# Patient Record
Sex: FEMALE | Race: BLACK OR AFRICAN AMERICAN | NOT HISPANIC OR LATINO | Employment: UNEMPLOYED | URBAN - METROPOLITAN AREA
[De-identification: names, ages, dates, MRNs, and addresses within clinical notes are randomized per-mention and may not be internally consistent; named-entity substitution may affect disease eponyms.]

---

## 2017-08-08 ENCOUNTER — HOSPITAL ENCOUNTER (EMERGENCY)
Facility: HOSPITAL | Age: 37
Discharge: HOME | End: 2017-08-08
Attending: EMERGENCY MEDICINE
Payer: MEDICAID

## 2017-08-08 VITALS
OXYGEN SATURATION: 99 % | HEIGHT: 67 IN | TEMPERATURE: 98 F | DIASTOLIC BLOOD PRESSURE: 83 MMHG | BODY MASS INDEX: 45.99 KG/M2 | RESPIRATION RATE: 20 BRPM | HEART RATE: 67 BPM | SYSTOLIC BLOOD PRESSURE: 141 MMHG | WEIGHT: 293 LBS

## 2017-08-08 DIAGNOSIS — R20.0 LEFT FACIAL NUMBNESS: Primary | ICD-10-CM

## 2017-08-08 DIAGNOSIS — R51.9 HEADACHE: ICD-10-CM

## 2017-08-08 LAB
ANION GAP SERPL CALC-SCNC: 8 MMOL/L
BASOPHILS # BLD AUTO: 0 K/UL
BASOPHILS NFR BLD: 0.4 %
BUN SERPL-MCNC: 11 MG/DL
CALCIUM SERPL-MCNC: 9.3 MG/DL
CHLORIDE SERPL-SCNC: 106 MMOL/L
CO2 SERPL-SCNC: 25 MMOL/L
CREAT SERPL-MCNC: 0.9 MG/DL
DIFFERENTIAL METHOD: ABNORMAL
EOSINOPHIL # BLD AUTO: 0.1 K/UL
EOSINOPHIL NFR BLD: 1.6 %
ERYTHROCYTE [DISTWIDTH] IN BLOOD BY AUTOMATED COUNT: 17.8 %
EST. GFR  (AFRICAN AMERICAN): >60 ML/MIN/1.73 M^2
EST. GFR  (NON AFRICAN AMERICAN): >60 ML/MIN/1.73 M^2
GLUCOSE SERPL-MCNC: 96 MG/DL
HCT VFR BLD AUTO: 38.9 %
HGB BLD-MCNC: 12.1 G/DL
LYMPHOCYTES # BLD AUTO: 1.3 K/UL
LYMPHOCYTES NFR BLD: 19.6 %
MCH RBC QN AUTO: 24.4 PG
MCHC RBC AUTO-ENTMCNC: 31.1 G/DL
MCV RBC AUTO: 78 FL
MONOCYTES # BLD AUTO: 0.2 K/UL
MONOCYTES NFR BLD: 3.6 %
NEUTROPHILS # BLD AUTO: 5.1 K/UL
NEUTROPHILS NFR BLD: 74.8 %
PLATELET # BLD AUTO: 373 K/UL
PMV BLD AUTO: 8.9 FL
POTASSIUM SERPL-SCNC: 4.4 MMOL/L
RBC # BLD AUTO: 4.98 M/UL
SODIUM SERPL-SCNC: 139 MMOL/L
WBC # BLD AUTO: 6.8 K/UL

## 2017-08-08 PROCEDURE — 96374 THER/PROPH/DIAG INJ IV PUSH: CPT

## 2017-08-08 PROCEDURE — 99284 EMERGENCY DEPT VISIT MOD MDM: CPT | Mod: 25

## 2017-08-08 PROCEDURE — 96361 HYDRATE IV INFUSION ADD-ON: CPT

## 2017-08-08 PROCEDURE — 25000003 PHARM REV CODE 250: Performed by: EMERGENCY MEDICINE

## 2017-08-08 PROCEDURE — 85025 COMPLETE CBC W/AUTO DIFF WBC: CPT

## 2017-08-08 PROCEDURE — 25500020 PHARM REV CODE 255

## 2017-08-08 PROCEDURE — 63600175 PHARM REV CODE 636 W HCPCS: Performed by: EMERGENCY MEDICINE

## 2017-08-08 PROCEDURE — 80048 BASIC METABOLIC PNL TOTAL CA: CPT

## 2017-08-08 PROCEDURE — 96375 TX/PRO/DX INJ NEW DRUG ADDON: CPT

## 2017-08-08 PROCEDURE — 36415 COLL VENOUS BLD VENIPUNCTURE: CPT

## 2017-08-08 RX ORDER — FLUOXETINE HYDROCHLORIDE 40 MG/1
40 CAPSULE ORAL DAILY
COMMUNITY

## 2017-08-08 RX ORDER — AMLODIPINE BESYLATE 10 MG/1
10 TABLET ORAL DAILY
COMMUNITY
End: 2020-09-07 | Stop reason: ALTCHOICE

## 2017-08-08 RX ORDER — KETOROLAC TROMETHAMINE 30 MG/ML
10 INJECTION, SOLUTION INTRAMUSCULAR; INTRAVENOUS
Status: COMPLETED | OUTPATIENT
Start: 2017-08-08 | End: 2017-08-08

## 2017-08-08 RX ORDER — VALSARTAN 160 MG/1
320 TABLET ORAL 2 TIMES DAILY
Status: ON HOLD | COMMUNITY
End: 2021-11-17 | Stop reason: HOSPADM

## 2017-08-08 RX ORDER — METOPROLOL SUCCINATE 50 MG/1
50 TABLET, EXTENDED RELEASE ORAL 2 TIMES DAILY
COMMUNITY
End: 2017-08-08

## 2017-08-08 RX ORDER — METOCLOPRAMIDE HYDROCHLORIDE 5 MG/ML
10 INJECTION INTRAMUSCULAR; INTRAVENOUS
Status: COMPLETED | OUTPATIENT
Start: 2017-08-08 | End: 2017-08-08

## 2017-08-08 RX ORDER — ALPRAZOLAM 2 MG/1
2 TABLET ORAL 3 TIMES DAILY PRN
Status: ON HOLD | COMMUNITY
End: 2022-02-05 | Stop reason: HOSPADM

## 2017-08-08 RX ORDER — METOPROLOL TARTRATE 50 MG/1
100 TABLET ORAL 2 TIMES DAILY
COMMUNITY
End: 2022-01-11

## 2017-08-08 RX ORDER — DIPHENHYDRAMINE HYDROCHLORIDE 50 MG/ML
25 INJECTION INTRAMUSCULAR; INTRAVENOUS
Status: COMPLETED | OUTPATIENT
Start: 2017-08-08 | End: 2017-08-08

## 2017-08-08 RX ORDER — HYDROCODONE BITARTRATE AND ACETAMINOPHEN 10; 325 MG/1; MG/1
1 TABLET ORAL EVERY 6 HOURS PRN
Status: ON HOLD | COMMUNITY
End: 2022-02-05 | Stop reason: HOSPADM

## 2017-08-08 RX ADMIN — SODIUM CHLORIDE 1000 ML: 0.9 INJECTION, SOLUTION INTRAVENOUS at 02:08

## 2017-08-08 RX ADMIN — IOHEXOL 75 ML: 350 INJECTION, SOLUTION INTRAVENOUS at 03:08

## 2017-08-08 RX ADMIN — DIPHENHYDRAMINE HYDROCHLORIDE 25 MG: 50 INJECTION, SOLUTION INTRAMUSCULAR; INTRAVENOUS at 02:08

## 2017-08-08 RX ADMIN — KETOROLAC TROMETHAMINE 10 MG: 30 INJECTION, SOLUTION INTRAMUSCULAR at 02:08

## 2017-08-08 RX ADMIN — METOCLOPRAMIDE 10 MG: 5 INJECTION, SOLUTION INTRAMUSCULAR; INTRAVENOUS at 02:08

## 2017-08-08 NOTE — ED NOTES
"Presents to the ER with c/o occipital headache with pressure behind her left eye that started yesterday. Patient reports that her headache is intermittent and "Just hurts." Associated complaints are photophobia and one episode of emesis today and left sided facial numbness. Bilateral hand  are strong and equal, positive sensation to upper and lower extremities, patient does have decreased sensation to left side of face, smile is symmetrical. Mucous membranes are pink and moist. Skin is warm, dry and intact. Lungs are clear bilaterally, respirations are regular and unlabored. Denies cough, congestion, rhinorrhea or SOB. BS active x4, no tenderness with palpation, abd is soft and not distended. Denies any appetite or activity change. S1S2, capillary refill is < 2 seconds. Denies dysuria, difficulty urinating, frequency, numbness, tingling or weakness. DAVION ARMSTRONGS    "

## 2017-08-08 NOTE — ED NOTES
"Patient has been updated on plan of care. Patient reports that she feels "Slightly better than when she arrived."   "

## 2017-08-08 NOTE — ED NOTES
Upon discharge, patient is AAOx4, no cardiac or respiratory complications. Follow up care reviewed with patient and has been instructed to return to the ER if needed. Patient verbalized understanding and ambulated to the lobby without difficulty. EDGAR RICARDO

## 2017-08-08 NOTE — ED PROVIDER NOTES
"Encounter Date: 8/8/2017    SCRIBE #1 NOTE: I, Boaz Humphries, am scribing for, and in the presence of, Dr. Fairchild.       History     Chief Complaint   Patient presents with    Headache     started yesterday. woke up this am with Left facial burning / numbness       08/08/2017 2:03 PM     Chief Complaint: ANGELA Hernandez is a 36 y.o. female with a history of HTN, Depression, Anxiety, and Obese who presents to the ED with complaints of gradually worsened HA since yesterday. Pt reports the pain started behind the left eye. She notes left facial numbness. She reports HAs associated with HTN. She has taken her HTN medication with no relief of Sx. Pt experienced 1 episode of vomited today. She denies rhinorrhea, cough, and fever. Pt ambulated fine. Pt has NKDA.       The history is provided by the patient.     Review of patient's allergies indicates:  No Known Allergies  Past Medical History:   Diagnosis Date    Hypertension     Obesity      Past Surgical History:   Procedure Laterality Date    CHOLECYSTECTOMY       History reviewed. No pertinent family history.  Social History   Substance Use Topics    Smoking status: Never Smoker    Smokeless tobacco: Not on file    Alcohol use No     Review of Systems   Constitutional: Negative for fever.   HENT: Negative for rhinorrhea and sore throat.    Respiratory: Negative for cough and shortness of breath.    Cardiovascular: Negative for chest pain.   Gastrointestinal: Positive for abdominal pain, nausea and vomiting.   Genitourinary: Negative for dysuria.   Musculoskeletal: Negative for back pain.   Skin: Negative for rash.   Neurological: Positive for headaches. Negative for weakness.        + for "left cheek numbness"   Hematological: Does not bruise/bleed easily.       Physical Exam     Initial Vitals [08/08/17 1221]   BP Pulse Resp Temp SpO2   (!) 176/117 87 20 98.4 °F (36.9 °C) 98 %      MAP       136.67         Physical Exam    Nursing note and " vitals reviewed.  Constitutional: She appears well-developed and well-nourished. She is not diaphoretic. No distress.   HENT:   Head: Normocephalic and atraumatic.   Mouth/Throat: Oropharynx is clear and moist.   Eyes: Conjunctivae are normal.   Neck: Neck supple.   Cardiovascular: Normal rate, regular rhythm, normal heart sounds and intact distal pulses. Exam reveals no gallop and no friction rub.    No murmur heard.  Pulmonary/Chest: Breath sounds normal. She has no wheezes. She has no rhonchi. She has no rales.   Abdominal: Soft. She exhibits no distension. There is no tenderness.   Musculoskeletal: Normal range of motion.   Normal gait.    Neurological: She is alert and oriented to person, place, and time. No cranial nerve deficit.   Decrease sensation to light touch left axial facial. Cranial nerves intact. Normal sensation and strength.    Skin: No rash noted. No erythema.   Psychiatric: She has a normal mood and affect. Her speech is normal and behavior is normal. Judgment and thought content normal.         ED Course   Procedures  Labs Reviewed - No data to display              Imaging Results          CTA Brain (Final result)  Result time 08/08/17 17:39:21    Final result by Isa Braun MD (08/08/17 17:39:21)                 Impression:        Some limited evaluation with streaky artifact.  Small vessel disease cannot be excluded.  Also proximal left middle cerebral artery aneurysm cannot be excluded although the appearance is more likely due to artifact and the tortuosity but consider further evaluation a followup MRA.    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    Opacification of mastoids and paranasal sinuses as described.    No acute intracranial findings      Results were discussed with Dr. Fairchild        Electronically signed by: ISA BRAUN MD  Date:      08/08/17  Time:    17:39              Narrative:    CTA of the head was obtained axial scans obtained without and with IV contrast, 75 cc Omnipaque 350 injected to previously and multiplanar 2-D and 3-D reformatted images obtained    Comparison study: None    FINDINGS    Ventricles, sulci, fissures are unremarkable in appearance for the patient's age and there is no acute intracranial findings with no intracranial mass effect, acute intracranial hemorrhage or major vascular territory infarct..  There is a 2.0 x 0.6 cm expansile lytic lesion in the skull right parietal with overlying cortex thin but intact.    There is opacification of much of left mastoids with mild opacification right mastoids.  There is mucosal thickening and also 1 cm polypoid density suggesting retention cyst in the left maxillary sinus.    Streaky artifact along with some sinus opacification and in particular cavernous sinus somewhat limits evaluation.  There is hypoplastic left A1 segment there is irregularity of the peripheral aspects of the middle cerebral arteries and of the posterior cerebral arteries most likely artifact and less likely stenosis with no definite significant central intracranial stenosis.  Proximal left middle cervical artery aneurysm is difficult to exclude but the appearance more likely is due to the artifact and averaging or tortuous vessels.  If indicated clinically further evaluation could be considered with MRA and also suggest bone scan or followup MRI without and with contrast evaluate the calvarial lesion.    There is opacification of the superior sagittal sinus without superior sagittal sinus thrombosis.                            (d/w interpreting radiologist)    (rad read)    The patient was informed of the incidental finding(s) as well as the need for PCP or specialist follow-up for reevaluation and possible further investigation or monitoring.          Scribe Attestation:   Scribe #1: I performed the  above scribed service and the documentation accurately describes the services I performed. I attest to the accuracy of the note.    Attending Attestation:           Physician Attestation for Scribe:  Physician Attestation Statement for Scribe #1: I, Dr. Fairchild, reviewed documentation, as scribed by Boaz Humphries in my presence, and it is both accurate and complete.         Maribel Hernandez is a 36 y.o. female presenting with hypertension on initial presentation accompanied by headache and left lower facial paresthesias.  I did speak with Dr. Maynor Henning on-call for neurology to discuss the case and initial imaging.  The patient is too heavy for MRI imaging and he did recommend CT imaging with contrast as advised by radiology for assessment including for CVST.  I have very low suspicion for subarachnoid hemorrhage and do not think lumbar puncture is indicated.  I doubt idiopathic intracranial hypertension.  CVA less likely given lack of associated findings or symptoms.  Patient was treated symptomatically with IV fluids along with IV ketorolac and metoclopramide with significant improvement headache and resolution of hypertension.  I do not think IV antihypertensive are necessary.  I did offer admission for observation given inability to perform MRI.  I doubt CVST at this point.  No sign of PRES or ICH. She prefers to observe at home with close outpatient PCP and neurology follow-up with referrals given.  Incidental findings noted on CT discussed with patient in detail to be follow-up for further monitoring as well.  Detailed return precautions reviewed.          ED Course     Clinical Impression:   The primary encounter diagnosis was Left facial numbness. A diagnosis of Headache was also pertinent to this visit.                           Jenaro Fairchild MD  08/08/17 [id number]

## 2017-08-17 ENCOUNTER — TELEPHONE (OUTPATIENT)
Dept: NEUROLOGY | Facility: CLINIC | Age: 37
End: 2017-08-17

## 2017-08-17 NOTE — TELEPHONE ENCOUNTER
----- Message from Hollie Lopez sent at 8/17/2017 10:30 AM CDT -----  Schedule a new patient appointment.  Please call Dr. Mercer's office/Francie at [phone].

## 2017-08-17 NOTE — TELEPHONE ENCOUNTER
Returned call and spoke with Francie. Referral received. However, patient has medicaid. Informed Francie that our medicaid spots were booked until December. Francie verbalized understanding and will send referral over to [facility].

## 2020-08-18 ENCOUNTER — HOSPITAL ENCOUNTER (EMERGENCY)
Facility: HOSPITAL | Age: 40
Discharge: HOME | End: 2020-08-19
Attending: EMERGENCY MEDICINE
Payer: MEDICAID

## 2020-08-18 DIAGNOSIS — I10 HYPERTENSION, UNSPECIFIED TYPE: ICD-10-CM

## 2020-08-18 DIAGNOSIS — R00.2 PALPITATIONS: ICD-10-CM

## 2020-08-18 DIAGNOSIS — R51.9 ACUTE NONINTRACTABLE HEADACHE, UNSPECIFIED HEADACHE TYPE: Primary | ICD-10-CM

## 2020-08-18 LAB
BASOPHILS # BLD AUTO: 0.06 K/UL (ref 0–0.2)
BASOPHILS NFR BLD: 0.6 % (ref 0–1.9)
DIFFERENTIAL METHOD: ABNORMAL
EOSINOPHIL # BLD AUTO: 0.3 K/UL (ref 0–0.5)
EOSINOPHIL NFR BLD: 2.6 % (ref 0–8)
ERYTHROCYTE [DISTWIDTH] IN BLOOD BY AUTOMATED COUNT: 13.5 % (ref 11.5–14.5)
HCT VFR BLD AUTO: 43.1 % (ref 37–48.5)
HGB BLD-MCNC: 13.2 G/DL (ref 12–16)
IMM GRANULOCYTES # BLD AUTO: 0.04 K/UL (ref 0–0.04)
IMM GRANULOCYTES NFR BLD AUTO: 0.4 % (ref 0–0.5)
LYMPHOCYTES # BLD AUTO: 2.3 K/UL (ref 1–4.8)
LYMPHOCYTES NFR BLD: 23.6 % (ref 18–48)
MCH RBC QN AUTO: 28.1 PG (ref 27–31)
MCHC RBC AUTO-ENTMCNC: 30.6 G/DL (ref 32–36)
MCV RBC AUTO: 92 FL (ref 82–98)
MONOCYTES # BLD AUTO: 0.5 K/UL (ref 0.3–1)
MONOCYTES NFR BLD: 4.7 % (ref 4–15)
NEUTROPHILS # BLD AUTO: 6.7 K/UL (ref 1.8–7.7)
NEUTROPHILS NFR BLD: 68.1 % (ref 38–73)
NRBC BLD-RTO: 0 /100 WBC
PLATELET # BLD AUTO: 352 K/UL (ref 150–350)
PMV BLD AUTO: 10.5 FL (ref 9.2–12.9)
RBC # BLD AUTO: 4.69 M/UL (ref 4–5.4)
WBC # BLD AUTO: 9.86 K/UL (ref 3.9–12.7)

## 2020-08-18 PROCEDURE — 99285 EMERGENCY DEPT VISIT HI MDM: CPT | Mod: 25

## 2020-08-18 PROCEDURE — 85025 COMPLETE CBC W/AUTO DIFF WBC: CPT

## 2020-08-18 PROCEDURE — 93005 ELECTROCARDIOGRAM TRACING: CPT | Performed by: INTERNAL MEDICINE

## 2020-08-18 PROCEDURE — 84484 ASSAY OF TROPONIN QUANT: CPT

## 2020-08-18 PROCEDURE — 80053 COMPREHEN METABOLIC PANEL: CPT

## 2020-08-18 PROCEDURE — 84443 ASSAY THYROID STIM HORMONE: CPT

## 2020-08-18 PROCEDURE — 83735 ASSAY OF MAGNESIUM: CPT

## 2020-08-18 PROCEDURE — 83880 ASSAY OF NATRIURETIC PEPTIDE: CPT

## 2020-08-18 RX ORDER — PROCHLORPERAZINE EDISYLATE 5 MG/ML
10 INJECTION INTRAMUSCULAR; INTRAVENOUS
Status: COMPLETED | OUTPATIENT
Start: 2020-08-19 | End: 2020-08-19

## 2020-08-18 RX ORDER — DIPHENHYDRAMINE HYDROCHLORIDE 50 MG/ML
12.5 INJECTION INTRAMUSCULAR; INTRAVENOUS
Status: COMPLETED | OUTPATIENT
Start: 2020-08-19 | End: 2020-08-19

## 2020-08-18 RX ORDER — DIPHENHYDRAMINE HCL 25 MG
25 CAPSULE ORAL
Status: DISCONTINUED | OUTPATIENT
Start: 2020-08-19 | End: 2020-08-18

## 2020-08-19 VITALS
HEART RATE: 88 BPM | WEIGHT: 293 LBS | HEIGHT: 67 IN | BODY MASS INDEX: 45.99 KG/M2 | TEMPERATURE: 99 F | RESPIRATION RATE: 18 BRPM | SYSTOLIC BLOOD PRESSURE: 179 MMHG | DIASTOLIC BLOOD PRESSURE: 97 MMHG | OXYGEN SATURATION: 97 %

## 2020-08-19 LAB
ALBUMIN SERPL BCP-MCNC: 3.4 G/DL (ref 3.5–5.2)
ALP SERPL-CCNC: 129 U/L (ref 55–135)
ALT SERPL W/O P-5'-P-CCNC: 28 U/L (ref 10–44)
ANION GAP SERPL CALC-SCNC: 8 MMOL/L (ref 8–16)
AST SERPL-CCNC: 18 U/L (ref 10–40)
BILIRUB SERPL-MCNC: 1.2 MG/DL (ref 0.1–1)
BNP SERPL-MCNC: 47 PG/ML (ref 0–99)
BUN SERPL-MCNC: 13 MG/DL (ref 6–20)
CALCIUM SERPL-MCNC: 8.2 MG/DL (ref 8.7–10.5)
CHLORIDE SERPL-SCNC: 105 MMOL/L (ref 95–110)
CO2 SERPL-SCNC: 27 MMOL/L (ref 23–29)
CREAT SERPL-MCNC: 0.9 MG/DL (ref 0.5–1.4)
EST. GFR  (AFRICAN AMERICAN): >60 ML/MIN/1.73 M^2
EST. GFR  (NON AFRICAN AMERICAN): >60 ML/MIN/1.73 M^2
GLUCOSE SERPL-MCNC: 125 MG/DL (ref 70–110)
MAGNESIUM SERPL-MCNC: 1.9 MG/DL (ref 1.6–2.6)
POTASSIUM SERPL-SCNC: 3.6 MMOL/L (ref 3.5–5.1)
PROT SERPL-MCNC: 7.1 G/DL (ref 6–8.4)
SODIUM SERPL-SCNC: 140 MMOL/L (ref 136–145)
TROPONIN I SERPL DL<=0.01 NG/ML-MCNC: <0.03 NG/ML
TSH SERPL DL<=0.005 MIU/L-ACNC: 2.71 UIU/ML (ref 0.34–5.6)

## 2020-08-19 PROCEDURE — 96361 HYDRATE IV INFUSION ADD-ON: CPT

## 2020-08-19 PROCEDURE — 25000003 PHARM REV CODE 250

## 2020-08-19 PROCEDURE — 96374 THER/PROPH/DIAG INJ IV PUSH: CPT

## 2020-08-19 PROCEDURE — 63600175 PHARM REV CODE 636 W HCPCS

## 2020-08-19 PROCEDURE — 93005 ELECTROCARDIOGRAM TRACING: CPT | Performed by: INTERNAL MEDICINE

## 2020-08-19 PROCEDURE — 96375 TX/PRO/DX INJ NEW DRUG ADDON: CPT

## 2020-08-19 RX ORDER — CLONIDINE HYDROCHLORIDE 0.1 MG/1
0.1 TABLET ORAL
Status: COMPLETED | OUTPATIENT
Start: 2020-08-19 | End: 2020-08-19

## 2020-08-19 RX ADMIN — CLONIDINE HYDROCHLORIDE 0.1 MG: 0.1 TABLET ORAL at 01:08

## 2020-08-19 RX ADMIN — SODIUM CHLORIDE, SODIUM LACTATE, POTASSIUM CHLORIDE, AND CALCIUM CHLORIDE 1000 ML: .6; .31; .03; .02 INJECTION, SOLUTION INTRAVENOUS at 12:08

## 2020-08-19 RX ADMIN — DIPHENHYDRAMINE HYDROCHLORIDE 12.5 MG: 50 INJECTION, SOLUTION INTRAMUSCULAR; INTRAVENOUS at 12:08

## 2020-08-19 RX ADMIN — PROCHLORPERAZINE EDISYLATE 10 MG: 5 INJECTION INTRAMUSCULAR; INTRAVENOUS at 12:08

## 2020-08-19 NOTE — ED PROVIDER NOTES
Encounter Date: 8/18/2020       History     Chief Complaint   Patient presents with    Headache     Sudden. Worst in her life. 1 hour PTA.    Palpitations     Felt palptations while sitting in car. Resolved PTA.     HPI   40yo F with h/o HTN who presents with headache. The pt states she had sudden onset of severe frontal headache that started just prior to arrival. She has no numbness, weakness, or vision changes. No n/v/d. She does have photophobia. This occurred while sitting in her car talking to a friend. It came on with feelings of palpitations. She denies chest pain or SOB. Prior to onset she was feeling overall well and had a normal day. She has been compliant with her medications although tells me her BP typically runs 150s/90s despite medications. She does have a h/o headaches, but has not had one for months. Denies h/o heart disease.     Review of patient's allergies indicates:  No Known Allergies  Past Medical History:   Diagnosis Date    Hypertension     Obesity      Past Surgical History:   Procedure Laterality Date    CHOLECYSTECTOMY       No family history on file.  Social History     Tobacco Use    Smoking status: Never Smoker   Substance Use Topics    Alcohol use: No    Drug use: Not on file     Review of Systems   Constitutional: Negative for fever.   HENT: Negative for sore throat.    Eyes: Positive for photophobia.   Respiratory: Negative for shortness of breath.    Cardiovascular: Positive for palpitations. Negative for chest pain.   Gastrointestinal: Negative for nausea.   Genitourinary: Negative for dysuria.   Musculoskeletal: Negative for back pain.   Skin: Negative for rash.   Neurological: Positive for headaches. Negative for weakness.   Hematological: Does not bruise/bleed easily.       Physical Exam     Initial Vitals [08/18/20 2308]   BP Pulse Resp Temp SpO2   (!) 139/112 88 20 98.7 °F (37.1 °C) 96 %      MAP       --         Physical Exam    Constitutional: She appears  well-developed and well-nourished. She is not diaphoretic.   Able to self ambulate from stretcher to bed. Speaks in full sentences. Alert   HENT:   Head: Normocephalic and atraumatic.   Eyes: Conjunctivae and EOM are normal. Pupils are equal, round, and reactive to light. Right eye exhibits no discharge. Left eye exhibits no discharge.   Pupils 4mm b/l, PERRL   Neck: Normal range of motion. Neck supple.   Cardiovascular: Normal rate, regular rhythm and normal heart sounds. Exam reveals no gallop and no friction rub.    No murmur heard.  2+ radial pulses equal b/l   Pulmonary/Chest: Breath sounds normal. No respiratory distress. She has no wheezes. She has no rhonchi. She has no rales.   Abdominal: Soft. She exhibits no distension. There is no abdominal tenderness. There is no rebound and no guarding.   Musculoskeletal: No tenderness or edema.   Neurological: She is alert and oriented to person, place, and time.   CN II-XII in tact. Strength 5/5 in b/l LE, 5/5 in b/l UE. Sensation to light touch is in tact in b/l UE and LE. No dysarthria, clear speech.   Skin: Skin is warm and dry.   Psychiatric: She has a normal mood and affect. Thought content normal.         ED Course   Procedures  Labs Reviewed   CBC W/ AUTO DIFFERENTIAL   COMPREHENSIVE METABOLIC PANEL   B-TYPE NATRIURETIC PEPTIDE   TROPONIN I   TSH   MAGNESIUM          Imaging Results    None       MDM  40yo F with headache  VS with EMS notable for HTN to SBP 170s, regular rhythm on the monitor.  On chart review the patient did have a CTA 3 years prior notable for artifact that could not rule out MCA aneurysm (although does not appear to be an aneurysm per radiology) and an expansile lytic lesion in the rt parietal bone. The patient was lost to follow up and it appears that she was unable to obtain an MRI due to her obesity. Will screen for causes of palpitations with cardiac w/u and screen for intracranial bleed with CT scan. Given <1hr since onset, I would  expect a high NPV of a normal CTH. Pending labs and w/u, will treat HA following EKG.    Kristian Hernandez MD  Resident, PGY-3  8/18/2020 11:34 PM                    Attending Attestation:   Physician Attestation Statement for Resident:  As the supervising MD   Physician Attestation Statement: I have personally seen and examined this patient.   I agree with the above history. -: Patient presents with acute onset of headache just prior to arrival.  Patient also reports palpitations.   As the supervising MD I agree with the above PE.   -: No neurologic deficits.   As the supervising MD I agree with the above treatment, course, plan, and disposition.   -: Patient presents with acute headache.  No neurologic deficits.  CT head is unremarkable.  Patient was noted to have PACs on telemetry monitoring.  Laboratory evaluation initiated.  Electrolytes are normal.  After treatment of headache and fluids PACs resolved as well.  I was personally present during the entire procedure.  I have reviewed and agree with the residents interpretation of the following: lab data and CT scans.  I have reviewed the following: old records at this facility and old CTs.                    ED Course as of Aug 19 0149   Tue Aug 18, 2020   2349 EKG shows NSR at 83bpm with nl axis, nl intervals, no MAIKEL. There is nonspecific T wave changes.    [JT]      ED Course User Index  [JT] Kristian Hernandez MD                Clinical Impression:       ICD-10-CM ICD-9-CM   1. Acute nonintractable headache, unspecified headache type  R51 784.0   2. Palpitations  R00.2 785.1   3. Hypertension, unspecified type  I10 401.9                                Ric Castillo MD  08/19/20 0153

## 2020-08-19 NOTE — DISCHARGE INSTRUCTIONS
If you have chest pain, shortness of breath, passing out, or other concerning symptoms, please return to the ER.    It is important that you follow up with a neurologist regarding your headaches and the skull lesion seen on your CT scan.

## 2020-09-07 ENCOUNTER — HOSPITAL ENCOUNTER (OUTPATIENT)
Facility: HOSPITAL | Age: 40
Discharge: HOME | End: 2020-09-10
Attending: EMERGENCY MEDICINE | Admitting: INTERNAL MEDICINE
Payer: MEDICAID

## 2020-09-07 DIAGNOSIS — U07.1 COVID-19 VIRUS INFECTION: Primary | ICD-10-CM

## 2020-09-07 DIAGNOSIS — R06.02 SOB (SHORTNESS OF BREATH): ICD-10-CM

## 2020-09-07 DIAGNOSIS — Z03.89 RULED OUT FOR MYOCARDIAL INFARCTION: ICD-10-CM

## 2020-09-07 DIAGNOSIS — G47.33 OSA ON CPAP: ICD-10-CM

## 2020-09-07 DIAGNOSIS — Z20.822 SUSPECTED COVID-19 VIRUS INFECTION: ICD-10-CM

## 2020-09-07 PROBLEM — J12.82 PNEUMONIA DUE TO COVID-19 VIRUS: Status: ACTIVE | Noted: 2020-09-07

## 2020-09-07 PROBLEM — M19.90 ARTHRITIS: Status: ACTIVE | Noted: 2020-09-07

## 2020-09-07 PROBLEM — E66.01 CLASS 3 SEVERE OBESITY DUE TO EXCESS CALORIES WITH SERIOUS COMORBIDITY AND BODY MASS INDEX (BMI) OF 60.0 TO 69.9 IN ADULT: Status: ACTIVE | Noted: 2020-09-07

## 2020-09-07 PROBLEM — F41.9 ANXIETY: Status: ACTIVE | Noted: 2020-09-07

## 2020-09-07 PROBLEM — E66.813 CLASS 3 SEVERE OBESITY DUE TO EXCESS CALORIES WITH SERIOUS COMORBIDITY AND BODY MASS INDEX (BMI) OF 60.0 TO 69.9 IN ADULT: Status: ACTIVE | Noted: 2020-09-07

## 2020-09-07 PROBLEM — I10 ESSENTIAL HYPERTENSION: Status: ACTIVE | Noted: 2020-09-07

## 2020-09-07 PROBLEM — F33.1 MODERATE EPISODE OF RECURRENT MAJOR DEPRESSIVE DISORDER: Status: ACTIVE | Noted: 2020-09-07

## 2020-09-07 PROBLEM — D75.839 THROMBOCYTOSIS: Status: ACTIVE | Noted: 2020-09-07

## 2020-09-07 LAB
ALBUMIN SERPL BCP-MCNC: 3.5 G/DL (ref 3.5–5.2)
ALP SERPL-CCNC: 109 U/L (ref 55–135)
ALT SERPL W/O P-5'-P-CCNC: 37 U/L (ref 10–44)
ANION GAP SERPL CALC-SCNC: 14 MMOL/L (ref 8–16)
AST SERPL-CCNC: 36 U/L (ref 10–40)
BASOPHILS # BLD AUTO: 0.04 K/UL (ref 0–0.2)
BASOPHILS NFR BLD: 0.5 % (ref 0–1.9)
BILIRUB SERPL-MCNC: 1 MG/DL (ref 0.1–1)
BNP SERPL-MCNC: <10 PG/ML (ref 0–99)
BUN SERPL-MCNC: 9 MG/DL (ref 6–20)
CALCIUM SERPL-MCNC: 9.4 MG/DL (ref 8.7–10.5)
CHLORIDE SERPL-SCNC: 102 MMOL/L (ref 95–110)
CK SERPL-CCNC: 57 U/L (ref 20–180)
CO2 SERPL-SCNC: 27 MMOL/L (ref 23–29)
CREAT SERPL-MCNC: 1 MG/DL (ref 0.5–1.4)
CRP SERPL-MCNC: 13 MG/L (ref 0–8.2)
D DIMER PPP IA.FEU-MCNC: 0.37 MG/L FEU
DIFFERENTIAL METHOD: ABNORMAL
EOSINOPHIL # BLD AUTO: 0.2 K/UL (ref 0–0.5)
EOSINOPHIL NFR BLD: 2.6 % (ref 0–8)
ERYTHROCYTE [DISTWIDTH] IN BLOOD BY AUTOMATED COUNT: 14.1 % (ref 11.5–14.5)
ERYTHROCYTE [SEDIMENTATION RATE] IN BLOOD BY WESTERGREN METHOD: 20 MM/HR (ref 0–20)
EST. GFR  (AFRICAN AMERICAN): >60 ML/MIN/1.73 M^2
EST. GFR  (NON AFRICAN AMERICAN): >60 ML/MIN/1.73 M^2
FERRITIN SERPL-MCNC: 136 NG/ML (ref 20–300)
GLUCOSE SERPL-MCNC: 141 MG/DL (ref 70–110)
HCT VFR BLD AUTO: 46.3 % (ref 37–48.5)
HGB BLD-MCNC: 14 G/DL (ref 12–16)
IMM GRANULOCYTES # BLD AUTO: 0.08 K/UL (ref 0–0.04)
IMM GRANULOCYTES NFR BLD AUTO: 1.1 % (ref 0–0.5)
LACTATE SERPL-SCNC: 2.2 MMOL/L (ref 0.5–2.2)
LDH SERPL L TO P-CCNC: 272 U/L (ref 110–260)
LYMPHOCYTES # BLD AUTO: 1.6 K/UL (ref 1–4.8)
LYMPHOCYTES NFR BLD: 20.5 % (ref 18–48)
MCH RBC QN AUTO: 28.4 PG (ref 27–31)
MCHC RBC AUTO-ENTMCNC: 30.2 G/DL (ref 32–36)
MCV RBC AUTO: 94 FL (ref 82–98)
MONOCYTES # BLD AUTO: 0.5 K/UL (ref 0.3–1)
MONOCYTES NFR BLD: 6.9 % (ref 4–15)
NEUTROPHILS # BLD AUTO: 5.2 K/UL (ref 1.8–7.7)
NEUTROPHILS NFR BLD: 68.4 % (ref 38–73)
NRBC BLD-RTO: 0 /100 WBC
PLATELET # BLD AUTO: 584 K/UL (ref 150–350)
PMV BLD AUTO: 9.6 FL (ref 9.2–12.9)
POTASSIUM SERPL-SCNC: 4 MMOL/L (ref 3.5–5.1)
PROCALCITONIN SERPL IA-MCNC: 0.05 NG/ML
PROT SERPL-MCNC: 8.7 G/DL (ref 6–8.4)
RBC # BLD AUTO: 4.93 M/UL (ref 4–5.4)
SARS-COV-2 RDRP RESP QL NAA+PROBE: POSITIVE
SODIUM SERPL-SCNC: 143 MMOL/L (ref 136–145)
TROPONIN I SERPL DL<=0.01 NG/ML-MCNC: <0.006 NG/ML (ref 0–0.03)
WBC # BLD AUTO: 7.55 K/UL (ref 3.9–12.7)

## 2020-09-07 PROCEDURE — 85025 COMPLETE CBC W/AUTO DIFF WBC: CPT

## 2020-09-07 PROCEDURE — 82728 ASSAY OF FERRITIN: CPT

## 2020-09-07 PROCEDURE — 25000003 PHARM REV CODE 250: Performed by: NURSE PRACTITIONER

## 2020-09-07 PROCEDURE — 63600175 PHARM REV CODE 636 W HCPCS: Performed by: NURSE PRACTITIONER

## 2020-09-07 PROCEDURE — G0378 HOSPITAL OBSERVATION PER HR: HCPCS

## 2020-09-07 PROCEDURE — 85379 FIBRIN DEGRADATION QUANT: CPT

## 2020-09-07 PROCEDURE — 25000003 PHARM REV CODE 250: Performed by: EMERGENCY MEDICINE

## 2020-09-07 PROCEDURE — 93005 ELECTROCARDIOGRAM TRACING: CPT

## 2020-09-07 PROCEDURE — 93010 EKG 12-LEAD: ICD-10-PCS | Mod: ,,, | Performed by: INTERNAL MEDICINE

## 2020-09-07 PROCEDURE — U0002 COVID-19 LAB TEST NON-CDC: HCPCS

## 2020-09-07 PROCEDURE — 83880 ASSAY OF NATRIURETIC PEPTIDE: CPT

## 2020-09-07 PROCEDURE — 84145 PROCALCITONIN (PCT): CPT

## 2020-09-07 PROCEDURE — 82550 ASSAY OF CK (CPK): CPT

## 2020-09-07 PROCEDURE — 96366 THER/PROPH/DIAG IV INF ADDON: CPT | Performed by: EMERGENCY MEDICINE

## 2020-09-07 PROCEDURE — 36415 COLL VENOUS BLD VENIPUNCTURE: CPT

## 2020-09-07 PROCEDURE — 99285 EMERGENCY DEPT VISIT HI MDM: CPT | Mod: 25

## 2020-09-07 PROCEDURE — 96365 THER/PROPH/DIAG IV INF INIT: CPT | Performed by: EMERGENCY MEDICINE

## 2020-09-07 PROCEDURE — 93010 ELECTROCARDIOGRAM REPORT: CPT | Mod: ,,, | Performed by: INTERNAL MEDICINE

## 2020-09-07 PROCEDURE — 63700000 PHARM REV CODE 250 ALT 637 W/O HCPCS: Performed by: NURSE PRACTITIONER

## 2020-09-07 PROCEDURE — 84484 ASSAY OF TROPONIN QUANT: CPT

## 2020-09-07 PROCEDURE — 85651 RBC SED RATE NONAUTOMATED: CPT

## 2020-09-07 PROCEDURE — 86140 C-REACTIVE PROTEIN: CPT

## 2020-09-07 PROCEDURE — 83615 LACTATE (LD) (LDH) ENZYME: CPT

## 2020-09-07 PROCEDURE — 83605 ASSAY OF LACTIC ACID: CPT

## 2020-09-07 PROCEDURE — 80053 COMPREHEN METABOLIC PANEL: CPT

## 2020-09-07 RX ORDER — ACETAMINOPHEN 325 MG/1
650 TABLET ORAL EVERY 4 HOURS PRN
Status: DISCONTINUED | OUTPATIENT
Start: 2020-09-07 | End: 2020-09-10 | Stop reason: HOSPADM

## 2020-09-07 RX ORDER — FUROSEMIDE 40 MG/1
40 TABLET ORAL 2 TIMES DAILY
Status: DISCONTINUED | OUTPATIENT
Start: 2020-09-07 | End: 2020-09-07

## 2020-09-07 RX ORDER — HYDROCODONE BITARTRATE AND ACETAMINOPHEN 10; 325 MG/1; MG/1
1 TABLET ORAL EVERY 8 HOURS PRN
Status: DISCONTINUED | OUTPATIENT
Start: 2020-09-07 | End: 2020-09-10 | Stop reason: HOSPADM

## 2020-09-07 RX ORDER — ALPRAZOLAM 1 MG/1
2 TABLET ORAL 3 TIMES DAILY PRN
Status: DISCONTINUED | OUTPATIENT
Start: 2020-09-07 | End: 2020-09-10 | Stop reason: HOSPADM

## 2020-09-07 RX ORDER — ASCORBIC ACID 500 MG
500 TABLET ORAL 2 TIMES DAILY
Status: DISCONTINUED | OUTPATIENT
Start: 2020-09-07 | End: 2020-09-10 | Stop reason: HOSPADM

## 2020-09-07 RX ORDER — IBUPROFEN 200 MG
24 TABLET ORAL
Status: DISCONTINUED | OUTPATIENT
Start: 2020-09-07 | End: 2020-09-10 | Stop reason: HOSPADM

## 2020-09-07 RX ORDER — POTASSIUM CHLORIDE 1.5 G/1.58G
40 POWDER, FOR SOLUTION ORAL
Status: DISCONTINUED | OUTPATIENT
Start: 2020-09-07 | End: 2020-09-10 | Stop reason: HOSPADM

## 2020-09-07 RX ORDER — VALSARTAN 80 MG/1
320 TABLET ORAL 2 TIMES DAILY
Status: DISCONTINUED | OUTPATIENT
Start: 2020-09-07 | End: 2020-09-10 | Stop reason: HOSPADM

## 2020-09-07 RX ORDER — FUROSEMIDE 40 MG/1
40 TABLET ORAL 2 TIMES DAILY
COMMUNITY

## 2020-09-07 RX ORDER — LANOLIN ALCOHOL/MO/W.PET/CERES
800 CREAM (GRAM) TOPICAL
Status: DISCONTINUED | OUTPATIENT
Start: 2020-09-07 | End: 2020-09-10 | Stop reason: HOSPADM

## 2020-09-07 RX ORDER — ONDANSETRON 2 MG/ML
4 INJECTION INTRAMUSCULAR; INTRAVENOUS EVERY 6 HOURS PRN
Status: DISCONTINUED | OUTPATIENT
Start: 2020-09-07 | End: 2020-09-10 | Stop reason: HOSPADM

## 2020-09-07 RX ORDER — CLONIDINE 0.2 MG/24H
1 PATCH, EXTENDED RELEASE TRANSDERMAL
COMMUNITY

## 2020-09-07 RX ORDER — FLUOXETINE HYDROCHLORIDE 20 MG/1
40 CAPSULE ORAL DAILY
Status: DISCONTINUED | OUTPATIENT
Start: 2020-09-08 | End: 2020-09-10 | Stop reason: HOSPADM

## 2020-09-07 RX ORDER — IBUPROFEN 200 MG
16 TABLET ORAL
Status: DISCONTINUED | OUTPATIENT
Start: 2020-09-07 | End: 2020-09-10 | Stop reason: HOSPADM

## 2020-09-07 RX ORDER — METOPROLOL TARTRATE 50 MG/1
100 TABLET ORAL 2 TIMES DAILY
Status: DISCONTINUED | OUTPATIENT
Start: 2020-09-07 | End: 2020-09-08

## 2020-09-07 RX ORDER — GLUCAGON 1 MG
1 KIT INJECTION
Status: DISCONTINUED | OUTPATIENT
Start: 2020-09-07 | End: 2020-09-10 | Stop reason: HOSPADM

## 2020-09-07 RX ORDER — ACETAMINOPHEN 500 MG
1000 TABLET ORAL
Status: COMPLETED | OUTPATIENT
Start: 2020-09-07 | End: 2020-09-07

## 2020-09-07 RX ORDER — AZITHROMYCIN 250 MG/1
500 TABLET, FILM COATED ORAL
Status: COMPLETED | OUTPATIENT
Start: 2020-09-07 | End: 2020-09-09

## 2020-09-07 RX ORDER — CLONIDINE 0.2 MG/24H
1 PATCH, EXTENDED RELEASE TRANSDERMAL
Status: DISCONTINUED | OUTPATIENT
Start: 2020-09-08 | End: 2020-09-10 | Stop reason: HOSPADM

## 2020-09-07 RX ORDER — CHOLECALCIFEROL (VITAMIN D3) 25 MCG
1000 TABLET ORAL DAILY
Status: DISCONTINUED | OUTPATIENT
Start: 2020-09-08 | End: 2020-09-10 | Stop reason: HOSPADM

## 2020-09-07 RX ORDER — SODIUM CHLORIDE 0.9 % (FLUSH) 0.9 %
10 SYRINGE (ML) INJECTION
Status: DISCONTINUED | OUTPATIENT
Start: 2020-09-07 | End: 2020-09-10 | Stop reason: HOSPADM

## 2020-09-07 RX ORDER — FUROSEMIDE 40 MG/1
40 TABLET ORAL DAILY
Status: DISCONTINUED | OUTPATIENT
Start: 2020-09-08 | End: 2020-09-10 | Stop reason: HOSPADM

## 2020-09-07 RX ORDER — IBUPROFEN 400 MG/1
400 TABLET ORAL
Status: COMPLETED | OUTPATIENT
Start: 2020-09-07 | End: 2020-09-07

## 2020-09-07 RX ADMIN — ACETAMINOPHEN 1000 MG: 500 TABLET ORAL at 06:09

## 2020-09-07 RX ADMIN — METOPROLOL TARTRATE 100 MG: 50 TABLET, FILM COATED ORAL at 08:09

## 2020-09-07 RX ADMIN — CEFTRIAXONE 1 G: 1 INJECTION, SOLUTION INTRAVENOUS at 08:09

## 2020-09-07 RX ADMIN — VALSARTAN 320 MG: 80 TABLET ORAL at 08:09

## 2020-09-07 RX ADMIN — AZITHROMYCIN MONOHYDRATE 500 MG: 250 TABLET ORAL at 08:09

## 2020-09-07 RX ADMIN — OXYCODONE HYDROCHLORIDE AND ACETAMINOPHEN 500 MG: 500 TABLET ORAL at 08:09

## 2020-09-07 RX ADMIN — IBUPROFEN 400 MG: 400 TABLET ORAL at 06:09

## 2020-09-07 RX ADMIN — ALPRAZOLAM 2 MG: 1 TABLET ORAL at 09:09

## 2020-09-07 RX ADMIN — FUROSEMIDE 40 MG: 40 TABLET ORAL at 08:09

## 2020-09-07 NOTE — ED PROVIDER NOTES
Encounter Date: 9/7/2020    SCRIBE #1 NOTE: I, Rosa Navarro, am scribing for, and in the presence of, Roverto Chen MD.       History     Chief Complaint   Patient presents with    Shortness of Breath     tested + for covid 8/17 and had CXR from urgent care today that showed pneumonia to the right lower lobe     Time seen by provider: 4:45 PM on 09/07/2020    Maribel Hernandez is a 39 y.o. female with a PMHx of HTN and obesity who presents to the ED with an onset of SOB. The patient complains of onset of worsening SOB since wakening this morning. The patient admits SOB is mild at rest, but worse when ambulating. The patient tested positive for COVID-19 8/18/2020. At the time of her testing positive, she had fever, chills, cough and muscle aches. She reports complete improvement, until waking up with SOB this morning. The patient was seen at urgent care PTA and had a chest XRAY showing pneumonia, and was directed to the ED. The patient denies any other symptoms at this time. PSHx of cholecystectomy.    The history is provided by the patient.     Review of patient's allergies indicates:  No Known Allergies  Past Medical History:   Diagnosis Date    Hypertension     Obesity      Past Surgical History:   Procedure Laterality Date    CHOLECYSTECTOMY       History reviewed. No pertinent family history.  Social History     Tobacco Use    Smoking status: Never Smoker   Substance Use Topics    Alcohol use: No    Drug use: Not on file     Review of Systems   Constitutional: Negative for chills and fever.   HENT: Negative for sore throat.    Respiratory: Positive for shortness of breath. Negative for cough.    Cardiovascular: Negative for chest pain.   Gastrointestinal: Negative for nausea.   Genitourinary: Negative for dysuria.   Musculoskeletal: Negative for back pain and myalgias.   Skin: Negative for rash.   Neurological: Negative for weakness.   Hematological: Does not bruise/bleed easily.   All other  systems reviewed and are negative.      Physical Exam     Initial Vitals [09/07/20 1628]   BP Pulse Resp Temp SpO2   (!) 143/85 86 (!) 22 98.8 °F (37.1 °C) (!) 92 %      MAP       --         Physical Exam    Nursing note and vitals reviewed.  Constitutional: She appears well-developed and well-nourished. She is Obese .  Non-toxic appearance.   Nontoxic, well appearing female. Morbidly obese.   HENT:   Head: Normocephalic and atraumatic.   Eyes: EOM are normal. Pupils are equal, round, and reactive to light.   Neck: Normal range of motion. Neck supple.   Cardiovascular: Normal rate, regular rhythm, normal heart sounds and intact distal pulses. Exam reveals no gallop and no friction rub.    No murmur heard.  Pulmonary/Chest: Breath sounds normal. No respiratory distress. She has no decreased breath sounds. She has no wheezes. She has no rhonchi. She has no rales.   Abdominal: Soft. Bowel sounds are normal. She exhibits no distension. There is no abdominal tenderness.   Musculoskeletal: Normal range of motion.   Neurological: She is alert and oriented to person, place, and time.   Skin: Skin is warm and dry.   Psychiatric: She has a normal mood and affect. Her behavior is normal. Judgment and thought content normal.         ED Course   Procedures  Labs Reviewed   CBC W/ AUTO DIFFERENTIAL - Abnormal; Notable for the following components:       Result Value    Mean Corpuscular Hemoglobin Conc 30.2 (*)     Platelets 584 (*)     Immature Granulocytes 1.1 (*)     Immature Grans (Abs) 0.08 (*)     All other components within normal limits   SARS-COV-2 RNA AMPLIFICATION, QUAL - Abnormal; Notable for the following components:    SARS-CoV-2 RNA, Amplification, Qual Positive (*)     All other components within normal limits   COMPREHENSIVE METABOLIC PANEL   C-REACTIVE PROTEIN   FERRITIN   LACTATE DEHYDROGENASE   CK   LACTIC ACID, PLASMA   TROPONIN I   PROCALCITONIN          Imaging Results          X-Ray Chest AP Portable (In  process)                  Medical Decision Making:   History:   Old Medical Records: I decided to obtain old medical records.  Independently Interpreted Test(s):   I have ordered and independently interpreted EKG Reading(s) - see prior notes  Clinical Tests:   Lab Tests: Ordered and Reviewed  Radiological Study: Ordered and Reviewed  Medical Tests: Ordered and Reviewed            Scribe Attestation:   Scribe #1: I performed the above scribed service and the documentation accurately describes the services I performed. I attest to the accuracy of the note.    I, Dr. Chen, personally performed the services described in this documentation. All medical record entries made by the scribe were at my direction and in my presence.  I have reviewed the chart and agree that the record reflects my personal performance and is accurate and complete.8:26 PM 09/07/2020            ED Course as of Sep 07 1749   Mon Sep 07, 2020   1636 BP(!): 143/85 [EF]   1636 Temp: 98.8 °F (37.1 °C) [EF]   1636 Temp src: Oral [EF]   1636 Pulse: 86 [EF]   1636 Resp(!): 22 [EF]   1636 SpO2(!): 92 % [EF]   1718 Sinus rhythm 89 beats per minute normal axis no ST segment elevation or depression no T-wave inversion independently interpreted    [EF]   1744 WBC: 7.55 [EF]   1744 Hemoglobin: 14.0 [EF]   1744 Platelets(!): 584 [EF]   1746 SARS-CoV-2 RNA, Amplification, Qual(!): Positive [EF]      ED Course User Index  [EF] Roverto Chen MD                Clinical Impression:       ICD-10-CM ICD-9-CM   1. Suspected Covid-19 Virus Infection  R68.89          Disposition:   Disposition: Placed in Observation                 39-year-old female morbidly obese almost 400 lb presents to the emergency room with hypoxia shortness of breath.  COVID positive several weeks ago, she reports significant shortness of breath when ambulating.  Chest x-ray demonstrates interstitial infiltrates consistent with COVID.  I do not think this represents pulmonary embolism.   Hospital Medicine will admit patient as the chance she might worsen is significant given her obesity.  No distress in the ER at this time.       Roverto Chen MD  09/07/20 2027

## 2020-09-07 NOTE — ED NOTES
Maribel Hernandez presents to the ED with c/o SOB that started this morning. Patient reports that she was seen at Urgent Care and diagnosed with pneumonia. Patient tested positive for covid on 8/18/2020. Patient reports that her SOB is slight at rest and exacerbated with activity.      .   Mucous membranes are pink and moist. Skin is warm, dry and intact. Lungs are clear bilaterally, respirations are regular and unlabored. Denies  cough, congestion or rhinorrhea. BS active x4, no tenderness with palpation, abd is soft and not distended. Denies any appetite or activity change. S1S2, capillary refill is < 2 seconds. Denies dysuria, difficulty urinating, frequency, numbness, tingling or weakness. DAVION HERNÁNDEZ

## 2020-09-08 PROBLEM — I45.5 SINUS PAUSE: Status: ACTIVE | Noted: 2020-09-08

## 2020-09-08 PROBLEM — Z71.89 GOALS OF CARE, COUNSELING/DISCUSSION: Status: ACTIVE | Noted: 2020-09-08

## 2020-09-08 LAB
ABO + RH BLD: NORMAL
ALBUMIN SERPL BCP-MCNC: 3.2 G/DL (ref 3.5–5.2)
ALP SERPL-CCNC: 106 U/L (ref 55–135)
ALT SERPL W/O P-5'-P-CCNC: 39 U/L (ref 10–44)
ANION GAP SERPL CALC-SCNC: 13 MMOL/L (ref 8–16)
AST SERPL-CCNC: 37 U/L (ref 10–40)
BASOPHILS # BLD AUTO: 0.03 K/UL (ref 0–0.2)
BASOPHILS NFR BLD: 0.5 % (ref 0–1.9)
BILIRUB SERPL-MCNC: 0.8 MG/DL (ref 0.1–1)
BILIRUB UR QL STRIP: NEGATIVE
BLD GP AB SCN CELLS X3 SERPL QL: NORMAL
BUN SERPL-MCNC: 11 MG/DL (ref 6–20)
CALCIUM SERPL-MCNC: 9.2 MG/DL (ref 8.7–10.5)
CHLORIDE SERPL-SCNC: 103 MMOL/L (ref 95–110)
CK MB SERPL-MCNC: 0.4 NG/ML (ref 0.1–6.5)
CK MB SERPL-MCNC: 0.5 NG/ML (ref 0.1–6.5)
CK MB SERPL-RTO: 0.7 % (ref 0–5)
CK MB SERPL-RTO: 0.9 % (ref 0–5)
CK SERPL-CCNC: 55 U/L (ref 20–180)
CK SERPL-CCNC: 56 U/L (ref 20–180)
CLARITY UR: ABNORMAL
CO2 SERPL-SCNC: 28 MMOL/L (ref 23–29)
COLOR UR: YELLOW
CREAT SERPL-MCNC: 1 MG/DL (ref 0.5–1.4)
DIFFERENTIAL METHOD: ABNORMAL
EOSINOPHIL # BLD AUTO: 0.3 K/UL (ref 0–0.5)
EOSINOPHIL NFR BLD: 4.3 % (ref 0–8)
ERYTHROCYTE [DISTWIDTH] IN BLOOD BY AUTOMATED COUNT: 14.5 % (ref 11.5–14.5)
EST. GFR  (AFRICAN AMERICAN): >60 ML/MIN/1.73 M^2
EST. GFR  (NON AFRICAN AMERICAN): >60 ML/MIN/1.73 M^2
GLUCOSE SERPL-MCNC: 116 MG/DL (ref 70–110)
GLUCOSE UR QL STRIP: NEGATIVE
HCT VFR BLD AUTO: 45.1 % (ref 37–48.5)
HGB BLD-MCNC: 13.1 G/DL (ref 12–16)
HGB UR QL STRIP: NEGATIVE
IMM GRANULOCYTES # BLD AUTO: 0.07 K/UL (ref 0–0.04)
IMM GRANULOCYTES NFR BLD AUTO: 1.1 % (ref 0–0.5)
KETONES UR QL STRIP: ABNORMAL
LEUKOCYTE ESTERASE UR QL STRIP: NEGATIVE
LYMPHOCYTES # BLD AUTO: 1.7 K/UL (ref 1–4.8)
LYMPHOCYTES NFR BLD: 25.7 % (ref 18–48)
MAGNESIUM SERPL-MCNC: 2.4 MG/DL (ref 1.6–2.6)
MCH RBC QN AUTO: 28.1 PG (ref 27–31)
MCHC RBC AUTO-ENTMCNC: 29 G/DL (ref 32–36)
MCV RBC AUTO: 97 FL (ref 82–98)
MONOCYTES # BLD AUTO: 0.6 K/UL (ref 0.3–1)
MONOCYTES NFR BLD: 8.9 % (ref 4–15)
NEUTROPHILS # BLD AUTO: 3.9 K/UL (ref 1.8–7.7)
NEUTROPHILS NFR BLD: 59.5 % (ref 38–73)
NITRITE UR QL STRIP: NEGATIVE
NRBC BLD-RTO: 0 /100 WBC
PH UR STRIP: 6 [PH] (ref 5–8)
PHOSPHATE SERPL-MCNC: 3.7 MG/DL (ref 2.7–4.5)
PLATELET # BLD AUTO: 518 K/UL (ref 150–350)
PMV BLD AUTO: 9.6 FL (ref 9.2–12.9)
POTASSIUM SERPL-SCNC: 3.8 MMOL/L (ref 3.5–5.1)
PROT SERPL-MCNC: 8 G/DL (ref 6–8.4)
PROT UR QL STRIP: ABNORMAL
RBC # BLD AUTO: 4.66 M/UL (ref 4–5.4)
SODIUM SERPL-SCNC: 144 MMOL/L (ref 136–145)
SP GR UR STRIP: >=1.03 (ref 1–1.03)
TROPONIN I SERPL DL<=0.01 NG/ML-MCNC: <0.006 NG/ML (ref 0–0.03)
TROPONIN I SERPL DL<=0.01 NG/ML-MCNC: <0.006 NG/ML (ref 0–0.03)
TSH SERPL DL<=0.005 MIU/L-ACNC: 1.42 UIU/ML (ref 0.4–4)
URN SPEC COLLECT METH UR: ABNORMAL
UROBILINOGEN UR STRIP-ACNC: NEGATIVE EU/DL
WBC # BLD AUTO: 6.5 K/UL (ref 3.9–12.7)

## 2020-09-08 PROCEDURE — 83735 ASSAY OF MAGNESIUM: CPT

## 2020-09-08 PROCEDURE — 85025 COMPLETE CBC W/AUTO DIFF WBC: CPT

## 2020-09-08 PROCEDURE — 25500020 PHARM REV CODE 255

## 2020-09-08 PROCEDURE — 84443 ASSAY THYROID STIM HORMONE: CPT

## 2020-09-08 PROCEDURE — 99204 PR OFFICE/OUTPT VISIT, NEW, LEVL IV, 45-59 MIN: ICD-10-PCS | Mod: ,,, | Performed by: INTERNAL MEDICINE

## 2020-09-08 PROCEDURE — 63700000 PHARM REV CODE 250 ALT 637 W/O HCPCS: Performed by: NURSE PRACTITIONER

## 2020-09-08 PROCEDURE — 93010 EKG 12-LEAD: ICD-10-PCS | Mod: ,,, | Performed by: INTERNAL MEDICINE

## 2020-09-08 PROCEDURE — 93005 ELECTROCARDIOGRAM TRACING: CPT

## 2020-09-08 PROCEDURE — 99204 OFFICE O/P NEW MOD 45 MIN: CPT | Mod: ,,, | Performed by: INTERNAL MEDICINE

## 2020-09-08 PROCEDURE — 87040 BLOOD CULTURE FOR BACTERIA: CPT

## 2020-09-08 PROCEDURE — 82553 CREATINE MB FRACTION: CPT

## 2020-09-08 PROCEDURE — 81003 URINALYSIS AUTO W/O SCOPE: CPT

## 2020-09-08 PROCEDURE — 84100 ASSAY OF PHOSPHORUS: CPT

## 2020-09-08 PROCEDURE — 94761 N-INVAS EAR/PLS OXIMETRY MLT: CPT

## 2020-09-08 PROCEDURE — 93010 ELECTROCARDIOGRAM REPORT: CPT | Mod: ,,, | Performed by: INTERNAL MEDICINE

## 2020-09-08 PROCEDURE — G0378 HOSPITAL OBSERVATION PER HR: HCPCS

## 2020-09-08 PROCEDURE — 86850 RBC ANTIBODY SCREEN: CPT

## 2020-09-08 PROCEDURE — 25000003 PHARM REV CODE 250: Performed by: NURSE PRACTITIONER

## 2020-09-08 PROCEDURE — 82550 ASSAY OF CK (CPK): CPT

## 2020-09-08 PROCEDURE — 80053 COMPREHEN METABOLIC PANEL: CPT

## 2020-09-08 PROCEDURE — 25000003 PHARM REV CODE 250: Performed by: INTERNAL MEDICINE

## 2020-09-08 PROCEDURE — 84484 ASSAY OF TROPONIN QUANT: CPT

## 2020-09-08 PROCEDURE — 63600175 PHARM REV CODE 636 W HCPCS: Performed by: INTERNAL MEDICINE

## 2020-09-08 PROCEDURE — 36415 COLL VENOUS BLD VENIPUNCTURE: CPT

## 2020-09-08 PROCEDURE — 96372 THER/PROPH/DIAG INJ SC/IM: CPT | Mod: 59 | Performed by: EMERGENCY MEDICINE

## 2020-09-08 RX ORDER — ENOXAPARIN SODIUM 100 MG/ML
40 INJECTION SUBCUTANEOUS EVERY 12 HOURS
Status: DISCONTINUED | OUTPATIENT
Start: 2020-09-08 | End: 2020-09-10 | Stop reason: HOSPADM

## 2020-09-08 RX ORDER — PANTOPRAZOLE SODIUM 40 MG/1
40 TABLET, DELAYED RELEASE ORAL DAILY
Status: DISCONTINUED | OUTPATIENT
Start: 2020-09-08 | End: 2020-09-10 | Stop reason: HOSPADM

## 2020-09-08 RX ADMIN — OXYCODONE HYDROCHLORIDE AND ACETAMINOPHEN 500 MG: 500 TABLET ORAL at 08:09

## 2020-09-08 RX ADMIN — ENOXAPARIN SODIUM 40 MG: 40 INJECTION SUBCUTANEOUS at 07:09

## 2020-09-08 RX ADMIN — VALSARTAN 320 MG: 80 TABLET ORAL at 07:09

## 2020-09-08 RX ADMIN — CLONIDINE 1 PATCH: 0.2 PATCH TRANSDERMAL at 08:09

## 2020-09-08 RX ADMIN — PANTOPRAZOLE SODIUM 40 MG: 40 TABLET, DELAYED RELEASE ORAL at 12:09

## 2020-09-08 RX ADMIN — HYDROCODONE BITARTRATE AND ACETAMINOPHEN 1 TABLET: 10; 325 TABLET ORAL at 08:09

## 2020-09-08 RX ADMIN — DEXAMETHASONE 6 MG: 4 TABLET ORAL at 12:09

## 2020-09-08 RX ADMIN — THERA TABS 1 TABLET: TAB at 08:09

## 2020-09-08 RX ADMIN — VALSARTAN 320 MG: 80 TABLET ORAL at 08:09

## 2020-09-08 RX ADMIN — ALPRAZOLAM 2 MG: 1 TABLET ORAL at 11:09

## 2020-09-08 RX ADMIN — AZITHROMYCIN MONOHYDRATE 500 MG: 250 TABLET ORAL at 07:09

## 2020-09-08 RX ADMIN — METOPROLOL TARTRATE 100 MG: 50 TABLET, FILM COATED ORAL at 08:09

## 2020-09-08 RX ADMIN — FLUOXETINE 40 MG: 20 CAPSULE ORAL at 08:09

## 2020-09-08 RX ADMIN — OXYCODONE HYDROCHLORIDE AND ACETAMINOPHEN 500 MG: 500 TABLET ORAL at 07:09

## 2020-09-08 RX ADMIN — IOHEXOL 100 ML: 350 INJECTION, SOLUTION INTRAVENOUS at 10:09

## 2020-09-08 RX ADMIN — Medication 1000 UNITS: at 08:09

## 2020-09-08 RX ADMIN — ENOXAPARIN SODIUM 40 MG: 40 INJECTION SUBCUTANEOUS at 12:09

## 2020-09-08 RX ADMIN — FUROSEMIDE 40 MG: 40 TABLET ORAL at 08:09

## 2020-09-08 NOTE — SUBJECTIVE & OBJECTIVE
Interval History:  Morbidly obese female, reporting shortness of breath, presently requiring 2 liters/minute supplemental oxygen via nasal cannula.  No chest pain reported.  Tele monitoring recorded 7 sec sinus pause.  Patient uses CPAP at home for sleep apnea.  Patient denies any cough or expectoration.  Presently afebrile.    Review of Systems   Constitutional: Positive for activity change and fatigue. Negative for appetite change, chills and fever.   HENT: Negative for congestion, sinus pressure, sinus pain and sore throat.    Eyes: Negative for photophobia and visual disturbance.   Respiratory: Positive for shortness of breath. Negative for cough, choking, chest tightness and wheezing.    Cardiovascular: Negative for chest pain, palpitations and leg swelling.   Gastrointestinal: Positive for nausea. Negative for abdominal distention, abdominal pain, blood in stool, constipation, diarrhea and vomiting.   Genitourinary: Negative for difficulty urinating, dysuria, flank pain and hematuria.   Musculoskeletal: Positive for gait problem. Negative for back pain and joint swelling.        Chronic     Skin: Negative for rash and wound.   Neurological: Positive for weakness. Negative for dizziness, syncope, light-headedness, numbness and headaches.   Psychiatric/Behavioral: Negative for confusion. The patient is nervous/anxious.      Objective:     Vital Signs (Most Recent):  Temp: 97.5 °F (36.4 °C) (09/08/20 0820)  Pulse: 76 (09/08/20 0820)  Resp: 16 (09/08/20 0820)  BP: 128/78 (09/08/20 0820)  SpO2: (!) 92 % (09/08/20 0820) Vital Signs (24h Range):  Temp:  [96.4 °F (35.8 °C)-98.8 °F (37.1 °C)] 97.5 °F (36.4 °C)  Pulse:  [72-86] 76  Resp:  [14-22] 16  SpO2:  [92 %-96 %] 92 %  BP: (123-169)/(65-98) 128/78     Weight: (!) 180.5 kg (398 lb)  Body mass index is 62.34 kg/m².  No intake or output data in the 24 hours ending 09/08/20 0845   Physical Exam  Vitals signs and nursing note reviewed.   Constitutional:       General:  She is not in acute distress.     Appearance: She is well-developed. She is obese. She is not diaphoretic.   HENT:      Head: Normocephalic and atraumatic.   Eyes:      Pupils: Pupils are equal, round, and reactive to light.      Comments: Eyeglasses in use     Neck:      Musculoskeletal: Normal range of motion.      Vascular: No JVD.   Cardiovascular:      Rate and Rhythm: Normal rate and regular rhythm.   Pulmonary:      Effort: Pulmonary effort is normal. No respiratory distress.      Comments: Patient on room air during my initial interview and physical exam, patient in no acute respiratory distress.    Chest:      Chest wall: No tenderness.   Abdominal:      General: There is no distension.      Palpations: Abdomen is soft.      Tenderness: There is no abdominal tenderness. There is no guarding or rebound.   Genitourinary:     Comments: Exam Deferred     Musculoskeletal: Normal range of motion.         General: Swelling present. No tenderness or deformity.   Skin:     General: Skin is warm and dry.      Capillary Refill: Capillary refill takes 2 to 3 seconds.      Findings: No erythema or rash.   Neurological:      Mental Status: She is alert and oriented to person, place, and time.      Cranial Nerves: No cranial nerve deficit.      Sensory: No sensory deficit.   Psychiatric:         Behavior: Behavior normal.         Thought Content: Thought content normal.         Judgment: Judgment normal.         Significant Labs:   CBC:   Recent Labs   Lab 09/07/20  1700 09/08/20  0604   WBC 7.55 6.50   HGB 14.0 13.1   HCT 46.3 45.1   * 518*     CMP:   Recent Labs   Lab 09/07/20  1700 09/08/20  0604    144   K 4.0 3.8    103   CO2 27 28   * 116*   BUN 9 11   CREATININE 1.0 1.0   CALCIUM 9.4 9.2   PROT 8.7* 8.0   ALBUMIN 3.5 3.2*   BILITOT 1.0 0.8   ALKPHOS 109 106   AST 36 37   ALT 37 39   ANIONGAP 14 13   EGFRNONAA >60 >60     Microbiology Results (last 7 days)     Procedure Component Value  Units Date/Time    Blood culture (site 2) [[id number]] Collected: 09/08/20 0605    Order Status: Sent Specimen: Blood from Peripheral, Left Hand Updated: 09/08/20 0605    Blood culture (site 1) [[id number]] Collected: 09/08/20 0604    Order Status: Sent Specimen: Blood from Antecubital, Right Updated: 09/08/20 0605        Significant Imaging:  CXR: Pulmonary edema or pneumonia in the bilateral lung bases, new since prior.

## 2020-09-08 NOTE — PLAN OF CARE
Plan of care reviewed with patient. SR on telemetry. Remains free from falls/injury. Instructed to call for assistance as needed during night, verbalized understanding. Call light in reach, bed alarm on . Airborn precautions maintained as per protocol.

## 2020-09-08 NOTE — CONSULTS
"Jacquelyn Delgado NP   Nurse Practitioner   [facility]   H&P   Signed                       [facility]       Patient Name: Maribel Hernandez  MRN: [id number]  Admission Date: 9/7/2020  Attending Physician: Shiela Lynn MD   Primary Care Provider: Graciela Mercer NP           Patient information was obtained from patient, past medical records and ER records.      Subjective:      Principal Problem:COVID-19 virus infection.  I have been consulted for 7 second pause     Chief Complaint:        Chief Complaint   Patient presents with    Shortness of Breath       tested + for covid 8/17 and had CXR from urgent care today that showed pneumonia to the right lower lobe         HPI: Maribel Hernandez is a 39-year-old female with a past medical history of hypertension, COLIN, depression, anxiety, and morbid obesity who presents to the emergency room tonight with reports of shortness of breath.  Patient states on August 18, 2020 she tested COVID positive at the urgent care.  Patient states she has been in self quarantine ever since.  Patient states initially her symptoms included diarrhea, fever, and shortness of breath but have since subsided.  Patient reports she has been asymptomatic and afebrile for approximately 1 week.  Patient denies being prescribed antibiotic therapy during her initial phase of COVID.  Patient states this morning she woke up with shortness of breath accompanied by dyspnea on exertion.  Patient also endorses weakness and decreased energy.  The patient states It feels like someone was sitting on my chest."  Denies chest pain or chest pressure.  Patient states she was seen at the urgent care this morning in which a chest x-ray was obtained and was told she has pneumonia and instructed to come to the emergency room.  In the emergency room patient noted to be mildly hypoxic on room air and COVID positive.  Patient placed in observation on 09/07/2020 at " approximately 7:30 p.m..  Patient reports that she lives at home with her daughter, denies the use of supplemental oxygen, reports compliance with nightly CPAP, denies use of ambulatory assist devices.  Full code status verified upon admission to the hospital.  The patient is on telemetry.  She was noted to have a 7 second pause around 9:30 a.m..  The patient was asymptomatic.  Indeed, the patient reports that she was woken up from asleep by her nurse because of the pause.  She denies dizzy spells or syncope.  There is no prior history of myocardial infarction angina pectoris nitroglycerin use.  She sleeps elevated on 3-4 pillows; she reports orthopnea, possibly.  She reports dyspnea on walking a block or so.          Past Medical History:   Diagnosis Date    Anxiety      Depression      Hypertension      Obesity                 Past Surgical History:   Procedure Laterality Date    CHOLECYSTECTOMY        HYSTERECTOMY             Review of patient's allergies indicates:  No Known Allergies     No current facility-administered medications on file prior to encounter.            Current Outpatient Medications on File Prior to Encounter   Medication Sig    cloNIDine 0.2 mg/24 hr td ptwk (CATAPRES) 0.2 mg/24 hr Place 1 patch onto the skin every 7 days.    furosemide (LASIX) 40 MG tablet Take 40 mg by mouth 2 (two) times daily.    alprazolam (XANAX) 2 MG Tab Take 2 mg by mouth 3 (three) times daily as needed (anxiety).     diazepam (VALIUM) 5 MG tablet Take 1 tablet (5 mg total) by mouth every 8 (eight) hours as needed (muscle spasm.  may make drowsy).    fluoxetine (PROZAC) 40 MG capsule Take 40 mg by mouth once daily.    hydrocodone-acetaminophen 10-325mg (NORCO)  mg Tab Take 1 tablet by mouth every 8 (eight) hours as needed for Pain.    metoprolol tartrate (LOPRESSOR) 50 MG tablet Take 100 mg by mouth 2 (two) times daily.     valsartan (DIOVAN) 160 MG tablet Take 320 mg by mouth 2 (two) times daily.      [DISCONTINUED] amlodipine (NORVASC) 10 MG tablet Take 10 mg by mouth once daily.     [DISCONTINUED] hydrochlorothiazide (HYDRODIURIL) 25 MG tablet Take 25 mg by mouth 2 (two) times daily.           Family History      Family history is unknown by patient.                Tobacco Use    Smoking status: Never Smoker   Substance and Sexual Activity    Alcohol use: No    Drug use: Not on file    Sexual activity: Yes       Birth control/protection: None      Review of Systems   Constitutional: Positive for activity change and fatigue. Negative for appetite change, chills and fever.   HENT: Negative for congestion, sinus pressure, sinus pain and sore throat.    Eyes: Negative for photophobia and visual disturbance.   Respiratory: Positive for shortness of breath. Negative for cough, choking, chest tightness and wheezing.    Cardiovascular: Negative for chest pain, palpitations and leg swelling.   Gastrointestinal: Positive for nausea. Negative for abdominal distention, abdominal pain, blood in stool, constipation, diarrhea and vomiting.   Genitourinary: Negative for difficulty urinating, dysuria, flank pain and hematuria.   Musculoskeletal: Positive for gait problem. Negative for back pain and joint swelling.        Chronic     Skin: Negative for rash and wound.   Neurological: Positive for weakness. Negative for dizziness, syncope, light-headedness, numbness and headaches.   Psychiatric/Behavioral: Negative for confusion. The patient is nervous/anxious.       Objective:      Vital Signs (Most Recent):  Temp: 98.8 °F (37.1 °C) (09/07/20 1628)  Pulse: 72 (09/07/20 1931)  Resp: (!) 22 (09/07/20 1628)  BP: 132/77 (09/07/20 1931)  SpO2: (!) 94 % (09/07/20 1931) Vital Signs (24h Range):  Temp:  [98.8 °F (37.1 °C)] 98.8 °F (37.1 °C)  Pulse:  [72-86] 72  Resp:  [22] 22  SpO2:  [92 %-96 %] 94 %  BP: (132-143)/(77-89) 132/77      Weight: (!) 180.5 kg (398 lb)  Body mass index is 62.34 kg/m².     Physical Exam  Vitals signs  and nursing note reviewed.   Constitutional:       General: She is not in acute distress.     Appearance: She is well-developed. She is obese. She is not diaphoretic.   HENT:      Head: Normocephalic and atraumatic.   Eyes:      Pupils: Pupils are equal, round, and reactive to light.      Comments: Eyeglasses in use     Neck:      Musculoskeletal: Normal range of motion.      Vascular: No JVD.   Cardiovascular:      Rate and Rhythm: Normal rate and regular rhythm.   Pulmonary:      Effort: Pulmonary effort is normal. No respiratory distress.      Comments: Patient on room air during my initial interview and physical exam, patient in no acute respiratory distress.    Chest:      Chest wall: No tenderness.   Abdominal:      General: There is no distension.      Palpations: Abdomen is soft.      Tenderness: There is no abdominal tenderness. There is no guarding or rebound.   Genitourinary:     Comments: Exam Deferred     Musculoskeletal: Normal range of motion.         General: Swelling present. No tenderness or deformity.   Skin:     General: Skin is warm and dry.      Capillary Refill: Capillary refill takes 2 to 3 seconds.      Findings: No erythema or rash.   Neurological:      Mental Status: She is alert and oriented to person, place, and time.      Cranial Nerves: No cranial nerve deficit.      Sensory: No sensory deficit.   Psychiatric:         Behavior: Behavior normal.         Thought Content: Thought content normal.         Judgment: Judgment normal.               CRANIAL NERVES      CN III, IV, VI   Pupils are equal, round, and reactive to light.        Significant Labs:   BMP:       Recent Labs   Lab 09/07/20  1700   *      K 4.0      CO2 27   BUN 9   CREATININE 1.0   CALCIUM 9.4      CBC:       Recent Labs   Lab 09/07/20  1700   WBC 7.55   HGB 14.0   HCT 46.3   *      CMP:       Recent Labs   Lab 09/07/20  1700      K 4.0      CO2 27   *   BUN 9   CREATININE 1.0    CALCIUM 9.4   PROT 8.7*   ALBUMIN 3.5   BILITOT 1.0   ALKPHOS 109   AST 36   ALT 37   ANIONGAP 14   EGFRNONAA >60         Lactic Acid:       Recent Labs   Lab 09/07/20  1728   LACTATE 2.2      Troponin:   Recent Labs   Lab 09/07/20  1700   TROPONINI <0.006      Ferritin - 136  CRP - 13.0  Procalcitonin - 0.05  COVID - POSITIVE  CK- 57  LD - 272  All pertinent labs within the past 24 hours have been reviewed.     Significant Imaging: I have reviewed all pertinent imaging results/findings within the past 24 hours.      Chest Xray:  Bilateral interstitial infiltrates. Awaiting official radiology read.           EKG performed on 9/7/2020, impression as below:  Normal sinus rhythm  Abnormal QRS-T angle, consider primary T wave abnormality  Abnormal ECG  When compared with ECG of 19-AUG-2020 01:37,  No significant change was found   Repeat ECG this morning reveals sinus rhythm and is within normal limits.    Assessment/Plan:      * COVID-19 virus infection  - COVID-19 testing   - Infection Control notified      - Isolation:   - Airborne, Contact and Droplet Precautions  - Cohort patients into COVID units  - N95 mask, wear eye protection  - 20 second hand hygiene              - Limit visitors per hospital policy              - Consolidating lab draws, nursing care, provider visits, and interventions     - Diagnostics: (leukopenia, hyponatremia, hyperferritinemia, elevated troponin, elevated d-dimer, age, and comorbidities are significant predictors of poor clinical outcome)  CBC, CMP, Procalcitonin, Ferritin, CRP, LDH, BNP, Troponin, ECG, Blood Culture x2, Portable CXR and UA and culture     - Management:  Supplemental O2 to maintain SpO2 >92%  Telemetry  Continuous/intermittent Pulse Ox  Careful use of steroids in the absence of other indications - unless septic shock due to increased viral replication Fluid sparing resuscitation and Empiric antibiotics per likely source & patient allergies if patient hypoxic with  airspace disease as 1 time doses CAP: azithromycin & ceftriaxone   Patient mildly hypoxic initially in ER - upon admission hypoxemia not present, SpO2 98% on RA during my initial physical exam. Will utilize supplemental O2 as needed to maintain SpO2 >90%. In the setting of suspected RLL pneumonia, will initiate Rocephin and Azithromycin.      Advance Care Planning   FULL CODE                     Essential hypertension  Chronic, controlled.  Latest blood pressure and vitals reviewed-   Temp:  [98.2 °F (36.8 °C)-98.8 °F (37.1 °C)]   Pulse:  [72-86]   Resp:  [14-22]   BP: (132-169)/(77-98)   SpO2:  [92 %-96 %] .   Home meds for hypertension were reviewed and noted below. Hospital anti-hypertensive changes were made as shown below.        Hypertension Medications                 cloNIDine 0.2 mg/24 hr td ptwk (CATAPRES) 0.2 mg/24 hr Place 1 patch onto the skin every 7 days.     furosemide (LASIX) 40 MG tablet Take 40 mg by mouth 2 (two) times daily.     metoprolol tartrate (LOPRESSOR) 50 MG tablet Take 100 mg by mouth 2 (two) times daily.      valsartan (DIOVAN) 160 MG tablet Take 320 mg by mouth 2 (two) times daily.                    Hospital Medications                 cloNIDine 0.2 mg/24 hr td ptwk 1 patch Starting on 9/8/2020. 1 patch, Transdermal, Every 7 days     furosemide tablet 40 mg 40 mg, Oral, 2 times daily     metoprolol tartrate (LOPRESSOR) tablet 100 mg 100 mg, Oral, 2 times daily     valsartan tablet 320 mg 320 mg, Oral, 2 times daily          Will treat with PRN antihypertensives, as needed, to maintain BP less than 180/110 or if patient becomes symptomatic.              Moderate episode of recurrent major depressive disorder  Chronic, controlled. Will continue home Prozac.           Anxiety  Chronic, controlled. Will continue home Xanax as prescribed PRN.           Arthritis  Chronic, controlled. Will continue PRN Norco.  reviewed.           COLIN on CPAP  Patient reports compliance with nightly CPAP  use, hold CPAP use due to COVID diagnosis.  the patient is not on CPAP in the hospital.           Thrombocytosis     Noted, will trend with daily CBC.     Class 3 severe obesity due to excess calories with serious comorbidity and body mass index (BMI) of 60.0 to 69.9 in adult  Body mass index is 62.34 kg/m². Morbid obesity complicates all aspects of disease management from diagnostic modalities to treatment. Weight loss encouraged and health benefits explained to patient. Nutritional counseling and physical activity encouraged.                   VTE Risk Mitigation (From admission, onward)                  Ordered        IP VTE HIGH RISK PATIENT  Once      09/07/20 2041        Place sequential compression device  Until discontinued      09/07/20 2041        Place AZAR hose  Until discontinued      09/07/20 2041                  The patient had a 7 second pause during sleep on telemetry.  Indeed, the patient was woken up by her nurse to check on her.  Ideally, the patient needs to be back on a CPAP-COVID considerations.  Metoprolol will be decreased to 50 mg b.i.d..  Patient additionally is on clonidine 0.2 mg/24 hour patch.  In all likelihood, the pause is secondary to sleep apnea.     MARCIAL Dumont MD Located within [facility]  Cardiology  [facility]

## 2020-09-08 NOTE — CHAPLAIN
Left small envelope pkt at nurses station to deliver to pt; made with compassion and care for CV+ patients, offering blessing for healing since chaplains are not allowed in CV rooms.

## 2020-09-08 NOTE — ASSESSMENT & PLAN NOTE
- COVID-19 testing   - Infection Control notified     - Isolation:   - Airborne, Contact and Droplet Precautions  - Cohort patients into COVID units  - N95 mask, wear eye protection  - 20 second hand hygiene              - Limit visitors per hospital policy              - Consolidating lab draws, nursing care, provider visits, and interventions    - Diagnostics: (leukopenia, hyponatremia, hyperferritinemia, elevated troponin, elevated d-dimer, age, and comorbidities are significant predictors of poor clinical outcome)  CBC, CMP, Procalcitonin, Ferritin, CRP, LDH, BNP, Troponin, ECG, Blood Culture x2, Portable CXR and UA and culture    - Management:  Supplemental O2 to maintain SpO2 >92%  Telemetry  Continuous/intermittent Pulse Ox  Careful use of steroids in the absence of other indications - unless septic shock due to increased viral replication Fluid sparing resuscitation and Empiric antibiotics per likely source & patient allergies if patient hypoxic with airspace disease as 1 time doses CAP: azithromycin & ceftriaxone   Patient mildly hypoxic initially in ER - upon admission hypoxemia not present, SpO2 98% on RA during my initial physical exam. Will utilize supplemental O2 as needed to maintain SpO2 >90%. In the setting of suspected RLL pneumonia, will initiate Rocephin and Azithromycin.     Advance Care Planning   FULL CODE

## 2020-09-08 NOTE — ASSESSMENT & PLAN NOTE
Body mass index is 62.34 kg/m². Morbid obesity complicates all aspects of disease management from diagnostic modalities to treatment. Weight loss encouraged and health benefits explained to patient. Nutritional counseling and physical activity encouraged.

## 2020-09-08 NOTE — ASSESSMENT & PLAN NOTE
Chronic, controlled.  Latest blood pressure and vitals reviewed-   Temp:  [98.2 °F (36.8 °C)-98.8 °F (37.1 °C)]   Pulse:  [72-86]   Resp:  [14-22]   BP: (132-169)/(77-98)   SpO2:  [92 %-96 %] .   Home meds for hypertension were reviewed and noted below. Hospital anti-hypertensive changes were made as shown below.  Hypertension Medications             cloNIDine 0.2 mg/24 hr td ptwk (CATAPRES) 0.2 mg/24 hr Place 1 patch onto the skin every 7 days.    furosemide (LASIX) 40 MG tablet Take 40 mg by mouth 2 (two) times daily.    metoprolol tartrate (LOPRESSOR) 50 MG tablet Take 100 mg by mouth 2 (two) times daily.     valsartan (DIOVAN) 160 MG tablet Take 320 mg by mouth 2 (two) times daily.       Hospital Medications             cloNIDine 0.2 mg/24 hr td ptwk 1 patch Starting on 9/8/2020. 1 patch, Transdermal, Every 7 days    furosemide tablet 40 mg 40 mg, Oral, 2 times daily    metoprolol tartrate (LOPRESSOR) tablet 100 mg 100 mg, Oral, 2 times daily    valsartan tablet 320 mg 320 mg, Oral, 2 times daily        Will treat with PRN antihypertensives, as needed, to maintain BP less than 180/110 or if patient becomes symptomatic.

## 2020-09-08 NOTE — H&P
"[facility] Medicine  History & Physical    Patient Name: Maribel Hernandez  MRN: [id number]  Admission Date: 9/7/2020  Attending Physician: Shiela Lynn MD   Primary Care Provider: Graciela Mercer NP         Patient information was obtained from patient, past medical records and ER records.     Subjective:     Principal Problem:COVID-19 virus infection    Chief Complaint:   Chief Complaint   Patient presents with    Shortness of Breath     tested + for covid 8/17 and had CXR from urgent care today that showed pneumonia to the right lower lobe        HPI: Maribel Hernandez is a 39-year-old female with a past medical history of hypertension, COLIN, depression, anxiety, and morbid obesity who presents to the emergency room tonight with reports of shortness of breath.  Patient states on August 18, 2020 she tested COVID positive at the urgent care.  Patient states she has been in self quarantine ever since.  Patient states initially her symptoms included diarrhea, fever, and shortness of breath but have since subsided.  Patient reports she has been asymptomatic and afebrile for approximately 1 week.  Patient denies being prescribed antibiotic therapy during her initial phase of COVID.  Patient states this morning she woke up with shortness of breath accompanied by dyspnea on exertion.  Patient also endorses weakness and decreased energy.  Patient states It feels like someone was sitting on my chest."  Denies chest pain or chest pressure.  Patient states she was seen at the urgent care this morning in which a chest x-ray was obtained and was told she has pneumonia and instructed to come to the emergency room.  In the emergency room patient noted to be mildly hypoxic on room air and COVID positive.  Patient placed in observation on 09/07/2020 at approximately 7:30 p.m..  Patient reports that she lives at home with her daughter, denies the use of supplemental oxygen, reports " compliance with nightly CPAP, denies use of ambulatory assist devices.  Full code status verified upon admission to the hospital.    Past Medical History:   Diagnosis Date    Anxiety     Depression     Hypertension     Obesity        Past Surgical History:   Procedure Laterality Date    CHOLECYSTECTOMY      HYSTERECTOMY         Review of patient's allergies indicates:  No Known Allergies    No current facility-administered medications on file prior to encounter.      Current Outpatient Medications on File Prior to Encounter   Medication Sig    cloNIDine 0.2 mg/24 hr td ptwk (CATAPRES) 0.2 mg/24 hr Place 1 patch onto the skin every 7 days.    furosemide (LASIX) 40 MG tablet Take 40 mg by mouth 2 (two) times daily.    alprazolam (XANAX) 2 MG Tab Take 2 mg by mouth 3 (three) times daily as needed (anxiety).     diazepam (VALIUM) 5 MG tablet Take 1 tablet (5 mg total) by mouth every 8 (eight) hours as needed (muscle spasm.  may make drowsy).    fluoxetine (PROZAC) 40 MG capsule Take 40 mg by mouth once daily.    hydrocodone-acetaminophen 10-325mg (NORCO)  mg Tab Take 1 tablet by mouth every 8 (eight) hours as needed for Pain.    metoprolol tartrate (LOPRESSOR) 50 MG tablet Take 100 mg by mouth 2 (two) times daily.     valsartan (DIOVAN) 160 MG tablet Take 320 mg by mouth 2 (two) times daily.     [DISCONTINUED] amlodipine (NORVASC) 10 MG tablet Take 10 mg by mouth once daily.     [DISCONTINUED] hydrochlorothiazide (HYDRODIURIL) 25 MG tablet Take 25 mg by mouth 2 (two) times daily.      Family History     Family history is unknown by patient.        Tobacco Use    Smoking status: Never Smoker   Substance and Sexual Activity    Alcohol use: No    Drug use: Not on file    Sexual activity: Yes     Birth control/protection: None     Review of Systems   Constitutional: Positive for activity change and fatigue. Negative for appetite change, chills and fever.   HENT: Negative for congestion, sinus  pressure, sinus pain and sore throat.    Eyes: Negative for photophobia and visual disturbance.   Respiratory: Positive for shortness of breath. Negative for cough, choking, chest tightness and wheezing.    Cardiovascular: Negative for chest pain, palpitations and leg swelling.   Gastrointestinal: Positive for nausea. Negative for abdominal distention, abdominal pain, blood in stool, constipation, diarrhea and vomiting.   Genitourinary: Negative for difficulty urinating, dysuria, flank pain and hematuria.   Musculoskeletal: Positive for gait problem. Negative for back pain and joint swelling.        Chronic     Skin: Negative for rash and wound.   Neurological: Positive for weakness. Negative for dizziness, syncope, light-headedness, numbness and headaches.   Psychiatric/Behavioral: Negative for confusion. The patient is nervous/anxious.      Objective:     Vital Signs (Most Recent):  Temp: 98.8 °F (37.1 °C) (09/07/20 1628)  Pulse: 72 (09/07/20 1931)  Resp: (!) 22 (09/07/20 1628)  BP: 132/77 (09/07/20 1931)  SpO2: (!) 94 % (09/07/20 1931) Vital Signs (24h Range):  Temp:  [98.8 °F (37.1 °C)] 98.8 °F (37.1 °C)  Pulse:  [72-86] 72  Resp:  [22] 22  SpO2:  [92 %-96 %] 94 %  BP: (132-143)/(77-89) 132/77     Weight: (!) 180.5 kg (398 lb)  Body mass index is 62.34 kg/m².    Physical Exam  Vitals signs and nursing note reviewed.   Constitutional:       General: She is not in acute distress.     Appearance: She is well-developed. She is obese. She is not diaphoretic.   HENT:      Head: Normocephalic and atraumatic.   Eyes:      Pupils: Pupils are equal, round, and reactive to light.      Comments: Eyeglasses in use     Neck:      Musculoskeletal: Normal range of motion.      Vascular: No JVD.   Cardiovascular:      Rate and Rhythm: Normal rate and regular rhythm.   Pulmonary:      Effort: Pulmonary effort is normal. No respiratory distress.      Comments: Patient on room air during my initial interview and physical exam,  patient in no acute respiratory distress.    Chest:      Chest wall: No tenderness.   Abdominal:      General: There is no distension.      Palpations: Abdomen is soft.      Tenderness: There is no abdominal tenderness. There is no guarding or rebound.   Genitourinary:     Comments: Exam Deferred     Musculoskeletal: Normal range of motion.         General: Swelling present. No tenderness or deformity.   Skin:     General: Skin is warm and dry.      Capillary Refill: Capillary refill takes 2 to 3 seconds.      Findings: No erythema or rash.   Neurological:      Mental Status: She is alert and oriented to person, place, and time.      Cranial Nerves: No cranial nerve deficit.      Sensory: No sensory deficit.   Psychiatric:         Behavior: Behavior normal.         Thought Content: Thought content normal.         Judgment: Judgment normal.           CRANIAL NERVES     CN III, IV, VI   Pupils are equal, round, and reactive to light.       Significant Labs:   BMP:   Recent Labs   Lab 09/07/20  1700   *      K 4.0      CO2 27   BUN 9   CREATININE 1.0   CALCIUM 9.4     CBC:   Recent Labs   Lab 09/07/20  1700   WBC 7.55   HGB 14.0   HCT 46.3   *     CMP:   Recent Labs   Lab 09/07/20  1700      K 4.0      CO2 27   *   BUN 9   CREATININE 1.0   CALCIUM 9.4   PROT 8.7*   ALBUMIN 3.5   BILITOT 1.0   ALKPHOS 109   AST 36   ALT 37   ANIONGAP 14   EGFRNONAA >60       Lactic Acid:   Recent Labs   Lab 09/07/20  1728   LACTATE 2.2     Troponin:   Recent Labs   Lab 09/07/20  1700   TROPONINI <0.006     Ferritin - 136  CRP - 13.0  Procalcitonin - 0.05  COVID - POSITIVE  CK- 57  LD - 272  All pertinent labs within the past 24 hours have been reviewed.    Significant Imaging: I have reviewed all pertinent imaging results/findings within the past 24 hours.     Chest Xray:  Bilateral interstitial infiltrates. Awaiting official radiology read.        EKG performed on 9/7/2020, impression as  below:  Normal sinus rhythm  Abnormal QRS-T angle, consider primary T wave abnormality  Abnormal ECG  When compared with ECG of 19-AUG-2020 01:37,  No significant change was found    Assessment/Plan:     * COVID-19 virus infection  - COVID-19 testing   - Infection Control notified     - Isolation:   - Airborne, Contact and Droplet Precautions  - Cohort patients into COVID units  - N95 mask, wear eye protection  - 20 second hand hygiene              - Limit visitors per hospital policy              - Consolidating lab draws, nursing care, provider visits, and interventions    - Diagnostics: (leukopenia, hyponatremia, hyperferritinemia, elevated troponin, elevated d-dimer, age, and comorbidities are significant predictors of poor clinical outcome)  CBC, CMP, Procalcitonin, Ferritin, CRP, LDH, BNP, Troponin, ECG, Blood Culture x2, Portable CXR and UA and culture    - Management:  Supplemental O2 to maintain SpO2 >92%  Telemetry  Continuous/intermittent Pulse Ox  Careful use of steroids in the absence of other indications - unless septic shock due to increased viral replication Fluid sparing resuscitation and Empiric antibiotics per likely source & patient allergies if patient hypoxic with airspace disease as 1 time doses CAP: azithromycin & ceftriaxone   Patient mildly hypoxic initially in ER - upon admission hypoxemia not present, SpO2 98% on RA during my initial physical exam. Will utilize supplemental O2 as needed to maintain SpO2 >90%. In the setting of suspected RLL pneumonia, will initiate Rocephin and Azithromycin.     Advance Care Planning   FULL CODE                Essential hypertension  Chronic, controlled.  Latest blood pressure and vitals reviewed-   Temp:  [98.2 °F (36.8 °C)-98.8 °F (37.1 °C)]   Pulse:  [72-86]   Resp:  [14-22]   BP: (132-169)/(77-98)   SpO2:  [92 %-96 %] .   Home meds for hypertension were reviewed and noted below. Hospital anti-hypertensive changes were made as shown  below.  Hypertension Medications             cloNIDine 0.2 mg/24 hr td ptwk (CATAPRES) 0.2 mg/24 hr Place 1 patch onto the skin every 7 days.    furosemide (LASIX) 40 MG tablet Take 40 mg by mouth 2 (two) times daily.    metoprolol tartrate (LOPRESSOR) 50 MG tablet Take 100 mg by mouth 2 (two) times daily.     valsartan (DIOVAN) 160 MG tablet Take 320 mg by mouth 2 (two) times daily.       Hospital Medications             cloNIDine 0.2 mg/24 hr td ptwk 1 patch Starting on 9/8/2020. 1 patch, Transdermal, Every 7 days    furosemide tablet 40 mg 40 mg, Oral, 2 times daily    metoprolol tartrate (LOPRESSOR) tablet 100 mg 100 mg, Oral, 2 times daily    valsartan tablet 320 mg 320 mg, Oral, 2 times daily        Will treat with PRN antihypertensives, as needed, to maintain BP less than 180/110 or if patient becomes symptomatic.          Moderate episode of recurrent major depressive disorder  Chronic, controlled. Will continue home Prozac.        Anxiety  Chronic, controlled. Will continue home Xanax as prescribed PRN.        Arthritis  Chronic, controlled. Will continue PRN Norco.  reviewed.        COLIN on CPAP  Patient reports compliance with nightly CPAP use, hold CPAP use due to COVID diagnosis.         Thrombocytosis    Noted, will trend with daily CBC.    Class 3 severe obesity due to excess calories with serious comorbidity and body mass index (BMI) of 60.0 to 69.9 in adult  Body mass index is 62.34 kg/m². Morbid obesity complicates all aspects of disease management from diagnostic modalities to treatment. Weight loss encouraged and health benefits explained to patient. Nutritional counseling and physical activity encouraged.           VTE Risk Mitigation (From admission, onward)         Ordered     IP VTE HIGH RISK PATIENT  Once      09/07/20 2041     Place sequential compression device  Until discontinued      09/07/20 2041     Place AZAR hose  Until discontinued      09/07/20 2041               Time spent seeing  patient( greater than 1/2 spent in direct contact) : 65 minutes     Jacquelyn Delgado NP  Department of Hospital Medicine   [facility]

## 2020-09-08 NOTE — PROGRESS NOTES
"[facility] Medicine  Progress Note    Patient Name: Maribel Hernandez  MRN: [id number]  Patient Class: OP- Observation   Admission Date: 9/7/2020  Length of Stay: 0 days  Attending Physician: Shiela Lynn MD  Primary Care Provider: Graciela Mercer NP        Subjective:     Principal Problem:COVID-19 virus infection        HPI:  Maribel Hernandez is a 39-year-old female with a past medical history of hypertension, COLIN, depression, anxiety, and morbid obesity who presents to the emergency room tonight with reports of shortness of breath.  Patient states on August 18, 2020 she tested COVID positive at the urgent care.  Patient states she has been in self quarantine ever since.  Patient states initially her symptoms included diarrhea, fever, and shortness of breath but have since subsided.  Patient reports she has been asymptomatic and afebrile for approximately 1 week.  Patient denies being prescribed antibiotic therapy during her initial phase of COVID.  Patient states this morning she woke up with shortness of breath accompanied by dyspnea on exertion.  Patient also endorses weakness and decreased energy.  Patient states It feels like someone was sitting on my chest."  Denies chest pain or chest pressure.  Patient states she was seen at the urgent care this morning in which a chest x-ray was obtained and was told she has pneumonia and instructed to come to the emergency room.  In the emergency room patient noted to be mildly hypoxic on room air and COVID positive.  Patient placed in observation on 09/07/2020 at approximately 7:30 p.m..  Patient reports that she lives at home with her daughter, denies the use of supplemental oxygen, reports compliance with nightly CPAP, denies use of ambulatory assist devices.  Full code status verified upon admission to the hospital.    Overview/Hospital Course:  No notes on file    Interval History:  Morbidly obese female, reporting " shortness of breath, presently requiring 2 liters/minute supplemental oxygen via nasal cannula.  No chest pain reported.  Tele monitoring recorded 7 sec sinus pause.  Patient uses CPAP at home for sleep apnea.  Patient denies any cough or expectoration.  Presently afebrile.    Review of Systems   Constitutional: Positive for activity change and fatigue. Negative for appetite change, chills and fever.   HENT: Negative for congestion, sinus pressure, sinus pain and sore throat.    Eyes: Negative for photophobia and visual disturbance.   Respiratory: Positive for shortness of breath. Negative for cough, choking, chest tightness and wheezing.    Cardiovascular: Negative for chest pain, palpitations and leg swelling.   Gastrointestinal: Positive for nausea. Negative for abdominal distention, abdominal pain, blood in stool, constipation, diarrhea and vomiting.   Genitourinary: Negative for difficulty urinating, dysuria, flank pain and hematuria.   Musculoskeletal: Positive for gait problem. Negative for back pain and joint swelling.        Chronic     Skin: Negative for rash and wound.   Neurological: Positive for weakness. Negative for dizziness, syncope, light-headedness, numbness and headaches.   Psychiatric/Behavioral: Negative for confusion. The patient is nervous/anxious.      Objective:     Vital Signs (Most Recent):  Temp: 97.5 °F (36.4 °C) (09/08/20 0820)  Pulse: 76 (09/08/20 0820)  Resp: 16 (09/08/20 0820)  BP: 128/78 (09/08/20 0820)  SpO2: (!) 92 % (09/08/20 0820) Vital Signs (24h Range):  Temp:  [96.4 °F (35.8 °C)-98.8 °F (37.1 °C)] 97.5 °F (36.4 °C)  Pulse:  [72-86] 76  Resp:  [14-22] 16  SpO2:  [92 %-96 %] 92 %  BP: (123-169)/(65-98) 128/78     Weight: (!) 180.5 kg (398 lb)  Body mass index is 62.34 kg/m².  No intake or output data in the 24 hours ending 09/08/20 0845   Physical Exam  Vitals signs and nursing note reviewed.   Constitutional:       General: She is not in acute distress.     Appearance: She is  well-developed. She is obese. She is not diaphoretic.   HENT:      Head: Normocephalic and atraumatic.   Eyes:      Pupils: Pupils are equal, round, and reactive to light.      Comments: Eyeglasses in use     Neck:      Musculoskeletal: Normal range of motion.      Vascular: No JVD.   Cardiovascular:      Rate and Rhythm: Normal rate and regular rhythm.   Pulmonary:      Effort: Pulmonary effort is normal. No respiratory distress.      Comments: Patient on room air during my initial interview and physical exam, patient in no acute respiratory distress.    Chest:      Chest wall: No tenderness.   Abdominal:      General: There is no distension.      Palpations: Abdomen is soft.      Tenderness: There is no abdominal tenderness. There is no guarding or rebound.   Genitourinary:     Comments: Exam Deferred     Musculoskeletal: Normal range of motion.         General: Swelling present. No tenderness or deformity.   Skin:     General: Skin is warm and dry.      Capillary Refill: Capillary refill takes 2 to 3 seconds.      Findings: No erythema or rash.   Neurological:      Mental Status: She is alert and oriented to person, place, and time.      Cranial Nerves: No cranial nerve deficit.      Sensory: No sensory deficit.   Psychiatric:         Behavior: Behavior normal.         Thought Content: Thought content normal.         Judgment: Judgment normal.         Significant Labs:   CBC:   Recent Labs   Lab 09/07/20  1700 09/08/20  0604   WBC 7.55 6.50   HGB 14.0 13.1   HCT 46.3 45.1   * 518*     CMP:   Recent Labs   Lab 09/07/20  1700 09/08/20  0604    144   K 4.0 3.8    103   CO2 27 28   * 116*   BUN 9 11   CREATININE 1.0 1.0   CALCIUM 9.4 9.2   PROT 8.7* 8.0   ALBUMIN 3.5 3.2*   BILITOT 1.0 0.8   ALKPHOS 109 106   AST 36 37   ALT 37 39   ANIONGAP 14 13   EGFRNONAA >60 >60     Microbiology Results (last 7 days)     Procedure Component Value Units Date/Time    Blood culture (site 2) [[id number]]  Collected: 09/08/20 0605    Order Status: Sent Specimen: Blood from Peripheral, Left Hand Updated: 09/08/20 0605    Blood culture (site 1) [[id number]] Collected: 09/08/20 0604    Order Status: Sent Specimen: Blood from Antecubital, Right Updated: 09/08/20 0605        Significant Imaging:  CXR: Pulmonary edema or pneumonia in the bilateral lung bases, new since prior.      Assessment/Plan:      * COVID-19 virus infection  - COVID-19 testing   - Infection Control notified     - Isolation:   - Airborne, Contact and Droplet Precautions  - Cohort patients into COVID units  - N95 mask, wear eye protection  - 20 second hand hygiene              - Limit visitors per hospital policy              - Consolidating lab draws, nursing care, provider visits, and interventions    - Diagnostics: (leukopenia, hyponatremia, hyperferritinemia, elevated troponin, elevated d-dimer, age, and comorbidities are significant predictors of poor clinical outcome)  CBC, CMP, Procalcitonin, Ferritin, CRP, LDH, BNP, Troponin, ECG, Blood Culture x2, Portable CXR and UA and culture    - Management:  Supplemental O2 to maintain SpO2 >92%  Telemetry  Continuous/intermittent Pulse Ox  Careful use of steroids in the absence of other indications - unless septic shock due to increased viral replication Fluid sparing resuscitation and Empiric antibiotics per likely source & patient allergies if patient hypoxic with airspace disease as 1 time doses CAP: azithromycin & ceftriaxone   Continue intravenous Rocephin and azithromycin.  Continue CPAP use.  Consult pulmonary medicine.  Likely would benefit with convalescent plasma transfusion.  Consulting Remdesivir committee, patient agreeable to receive medication.  Start dexamethasone 6 mg daily for 10 days.    Advance Care Planning   FULL CODE                Sinus pause  Tele monitoring.  Serial cardiac enzyme.  Check TSH.  Holding metoprolol.  Patient takes metoprolol 100 mg twice daily.  Consult Dr. Elliott  Tim from Cardiology.  Follow 2D echocardiogram.      Goals of care, counseling/discussion  Advance Care Planning     Living Will  During this visit, I engaged the patient  in the advance care planning process.  The patient and I reviewed the role for advance directives and their purpose in directing future healthcare if the patient's unable to speak for him/herself.  At this point in time, the patient does have full decision-making capacity.  We discussed different extreme health states that she could experience, and reviewed what kind of medical care she would want in those situations.  The patient communicated that if she were comatose and had little chance of a meaningful recovery, she would want machines/life-sustaining treatments used.I spent a total of 17 minutes engaging the patient in this advance care planning discussion.            Essential hypertension  Chronic, controlled.  Latest blood pressure and vitals reviewed-   Temp:  [96.4 °F (35.8 °C)-98.8 °F (37.1 °C)]   Pulse:  [64-86]   Resp:  [14-22]   BP: (123-169)/(65-98)   SpO2:  [92 %-96 %] .   Home meds for hypertension were reviewed and noted below. Hospital anti-hypertensive changes were made as shown below.  Hypertension Medications             cloNIDine 0.2 mg/24 hr td ptwk (CATAPRES) 0.2 mg/24 hr Place 1 patch onto the skin every 7 days.    furosemide (LASIX) 40 MG tablet Take 40 mg by mouth 2 (two) times daily.    metoprolol tartrate (LOPRESSOR) 50 MG tablet Take 100 mg by mouth 2 (two) times daily.     valsartan (DIOVAN) 160 MG tablet Take 320 mg by mouth 2 (two) times daily.       Hospital Medications             cloNIDine 0.2 mg/24 hr td ptwk 1 patch Starting on 9/8/2020. 1 patch, Transdermal, Every 7 days    furosemide tablet 40 mg 40 mg, Oral, 2 times daily    metoprolol tartrate (LOPRESSOR) tablet 100 mg 100 mg, Oral, 2 times daily    valsartan tablet 320 mg 320 mg, Oral, 2 times daily        Will treat with PRN antihypertensives, as  needed, to maintain BP less than 180/110 or if patient becomes symptomatic.          Moderate episode of recurrent major depressive disorder  Chronic, controlled. Will continue home Prozac.        Anxiety  Chronic, controlled. Will continue home Xanax as prescribed PRN.        Arthritis  Chronic, controlled. Will continue PRN Norco.  reviewed.        COLIN on CPAP  Patient reports compliance with nightly CPAP use, hold CPAP use due to COVID diagnosis.         Thrombocytosis  Noted, will trend with daily CBC.    Class 3 severe obesity due to excess calories with serious comorbidity and body mass index (BMI) of 60.0 to 69.9 in adult  Body mass index is 62.34 kg/m². Morbid obesity complicates all aspects of disease management from diagnostic modalities to treatment. Weight loss encouraged and health benefits explained to patient. Nutritional counseling and physical activity encouraged.           VTE Risk Mitigation (From admission, onward)         Ordered     enoxaparin injection 40 mg  Every 12 hours      09/08/20 1012     IP VTE HIGH RISK PATIENT  Once      09/07/20 2041     Place sequential compression device  Until discontinued      09/07/20 2041     Place AZAR hose  Until discontinued      09/07/20 2041                Discharge Planning   BUDDY:  09-10-20    Code Status: Full Code   Is the patient medically ready for discharge?:     Reason for patient still in hospital (select all that apply): Patient trending condition                     Shiela Lynn MD  Department of Hospital Medicine   [facility]

## 2020-09-08 NOTE — HPI
"Maribel Hernandez is a 39-year-old female with a past medical history of hypertension, COLIN, depression, anxiety, and morbid obesity who presents to the emergency room tonight with reports of shortness of breath.  Patient states on August 18, 2020 she tested COVID positive at the urgent care.  Patient states she has been in self quarantine ever since.  Patient states initially her symptoms included diarrhea, fever, and shortness of breath but have since subsided.  Patient reports she has been asymptomatic and afebrile for approximately 1 week.  Patient denies being prescribed antibiotic therapy during her initial phase of COVID.  Patient states this morning she woke up with shortness of breath accompanied by dyspnea on exertion.  Patient also endorses weakness and decreased energy.  Patient states It feels like someone was sitting on my chest."  Denies chest pain or chest pressure.  Patient states she was seen at the urgent care this morning in which a chest x-ray was obtained and was told she has pneumonia and instructed to come to the emergency room.  In the emergency room patient noted to be mildly hypoxic on room air and COVID positive.  Patient placed in observation on 09/07/2020 at approximately 7:30 p.m..  Patient reports that she lives at home with her daughter, denies the use of supplemental oxygen, reports compliance with nightly CPAP, denies use of ambulatory assist devices.  Full code status verified upon admission to the hospital.  "

## 2020-09-08 NOTE — SUBJECTIVE & OBJECTIVE
Past Medical History:   Diagnosis Date    Anxiety     Depression     Hypertension     Obesity        Past Surgical History:   Procedure Laterality Date    CHOLECYSTECTOMY      HYSTERECTOMY         Review of patient's allergies indicates:  No Known Allergies    No current facility-administered medications on file prior to encounter.      Current Outpatient Medications on File Prior to Encounter   Medication Sig    cloNIDine 0.2 mg/24 hr td ptwk (CATAPRES) 0.2 mg/24 hr Place 1 patch onto the skin every 7 days.    furosemide (LASIX) 40 MG tablet Take 40 mg by mouth 2 (two) times daily.    alprazolam (XANAX) 2 MG Tab Take 2 mg by mouth 3 (three) times daily as needed (anxiety).     diazepam (VALIUM) 5 MG tablet Take 1 tablet (5 mg total) by mouth every 8 (eight) hours as needed (muscle spasm.  may make drowsy).    fluoxetine (PROZAC) 40 MG capsule Take 40 mg by mouth once daily.    hydrocodone-acetaminophen 10-325mg (NORCO)  mg Tab Take 1 tablet by mouth every 8 (eight) hours as needed for Pain.    metoprolol tartrate (LOPRESSOR) 50 MG tablet Take 100 mg by mouth 2 (two) times daily.     valsartan (DIOVAN) 160 MG tablet Take 320 mg by mouth 2 (two) times daily.     [DISCONTINUED] amlodipine (NORVASC) 10 MG tablet Take 10 mg by mouth once daily.     [DISCONTINUED] hydrochlorothiazide (HYDRODIURIL) 25 MG tablet Take 25 mg by mouth 2 (two) times daily.      Family History     Family history is unknown by patient.        Tobacco Use    Smoking status: Never Smoker   Substance and Sexual Activity    Alcohol use: No    Drug use: Not on file    Sexual activity: Yes     Birth control/protection: None     Review of Systems   Constitutional: Positive for activity change and fatigue. Negative for appetite change, chills and fever.   HENT: Negative for congestion, sinus pressure, sinus pain and sore throat.    Eyes: Negative for photophobia and visual disturbance.   Respiratory: Positive for shortness of  breath. Negative for cough, choking, chest tightness and wheezing.    Cardiovascular: Negative for chest pain, palpitations and leg swelling.   Gastrointestinal: Positive for nausea. Negative for abdominal distention, abdominal pain, blood in stool, constipation, diarrhea and vomiting.   Genitourinary: Negative for difficulty urinating, dysuria, flank pain and hematuria.   Musculoskeletal: Positive for gait problem. Negative for back pain and joint swelling.        Chronic     Skin: Negative for rash and wound.   Neurological: Positive for weakness. Negative for dizziness, syncope, light-headedness, numbness and headaches.   Psychiatric/Behavioral: Negative for confusion. The patient is nervous/anxious.      Objective:     Vital Signs (Most Recent):  Temp: 98.8 °F (37.1 °C) (09/07/20 1628)  Pulse: 72 (09/07/20 1931)  Resp: (!) 22 (09/07/20 1628)  BP: 132/77 (09/07/20 1931)  SpO2: (!) 94 % (09/07/20 1931) Vital Signs (24h Range):  Temp:  [98.8 °F (37.1 °C)] 98.8 °F (37.1 °C)  Pulse:  [72-86] 72  Resp:  [22] 22  SpO2:  [92 %-96 %] 94 %  BP: (132-143)/(77-89) 132/77     Weight: (!) 180.5 kg (398 lb)  Body mass index is 62.34 kg/m².    Physical Exam  Vitals signs and nursing note reviewed.   Constitutional:       General: She is not in acute distress.     Appearance: She is well-developed. She is obese. She is not diaphoretic.   HENT:      Head: Normocephalic and atraumatic.   Eyes:      Pupils: Pupils are equal, round, and reactive to light.      Comments: Eyeglasses in use     Neck:      Musculoskeletal: Normal range of motion.      Vascular: No JVD.   Cardiovascular:      Rate and Rhythm: Normal rate and regular rhythm.   Pulmonary:      Effort: Pulmonary effort is normal. No respiratory distress.      Comments: Patient on room air during my initial interview and physical exam, patient in no acute respiratory distress.    Chest:      Chest wall: No tenderness.   Abdominal:      General: There is no distension.       Palpations: Abdomen is soft.      Tenderness: There is no abdominal tenderness. There is no guarding or rebound.   Genitourinary:     Comments: Exam Deferred     Musculoskeletal: Normal range of motion.         General: Swelling present. No tenderness or deformity.   Skin:     General: Skin is warm and dry.      Capillary Refill: Capillary refill takes 2 to 3 seconds.      Findings: No erythema or rash.   Neurological:      Mental Status: She is alert and oriented to person, place, and time.      Cranial Nerves: No cranial nerve deficit.      Sensory: No sensory deficit.   Psychiatric:         Behavior: Behavior normal.         Thought Content: Thought content normal.         Judgment: Judgment normal.           CRANIAL NERVES     CN III, IV, VI   Pupils are equal, round, and reactive to light.       Significant Labs:   BMP:   Recent Labs   Lab 09/07/20  1700   *      K 4.0      CO2 27   BUN 9   CREATININE 1.0   CALCIUM 9.4     CBC:   Recent Labs   Lab 09/07/20  1700   WBC 7.55   HGB 14.0   HCT 46.3   *     CMP:   Recent Labs   Lab 09/07/20  1700      K 4.0      CO2 27   *   BUN 9   CREATININE 1.0   CALCIUM 9.4   PROT 8.7*   ALBUMIN 3.5   BILITOT 1.0   ALKPHOS 109   AST 36   ALT 37   ANIONGAP 14   EGFRNONAA >60       Lactic Acid:   Recent Labs   Lab 09/07/20  1728   LACTATE 2.2     Troponin:   Recent Labs   Lab 09/07/20  1700   TROPONINI <0.006     Ferritin - 136  CRP - 13.0  Procalcitonin - 0.05  COVID - POSITIVE  CK- 57  LD - 272  All pertinent labs within the past 24 hours have been reviewed.    Significant Imaging: I have reviewed all pertinent imaging results/findings within the past 24 hours.     Chest Xray:  Bilateral interstitial infiltrates. Awaiting official radiology read.        EKG performed on 9/7/2020, impression as below:  Normal sinus rhythm  Abnormal QRS-T angle, consider primary T wave abnormality  Abnormal ECG  When compared with ECG of 19-AUG-2020  01:37,  No significant change was found

## 2020-09-08 NOTE — RESPIRATORY THERAPY
09/08/20 0016   Patient Assessment/Suction   Level of Consciousness (AVPU) alert   Respiratory Effort Unlabored   Expansion/Accessory Muscles/Retractions no use of accessory muscles   PRE-TX-O2   O2 Device (Oxygen Therapy) room air   SpO2 95 %   Pulse Oximetry Type Intermittent   $ Pulse Oximetry - Multiple Charge Pulse Oximetry - Multiple

## 2020-09-08 NOTE — CARE UPDATE
09/08/20 0740   Patient Assessment/Suction   Level of Consciousness (AVPU) alert   PRE-TX-O2   O2 Device (Oxygen Therapy) room air   SpO2 (!) 94 %   Pulse Oximetry Type Continuous   $ Pulse Oximetry - Multiple Charge Pulse Oximetry - Multiple   Pulse 64   Resp 18

## 2020-09-08 NOTE — PLAN OF CARE
Plan of care reviewed with pt. MARLA throughout shift. Cardiac diet. O2 @2L NC.  Continue to Monitor Labs. VSS. Continuous telemetry and O2 monitor maintained. Safety maintained. Will continue to monitor. No complaints at this time.

## 2020-09-08 NOTE — PLAN OF CARE
Cm attempted to call pt, no answer. Cm tried calling pt's room and cell phone. Cm will follow-up.       09/08/20 8495   Discharge Assessment   Assessment Type Discharge Planning Assessment

## 2020-09-08 NOTE — ED NOTES
Maribel Hernandez with MRN#:  [id number] going to room 309 A placed on monitor box #[id number] and called to monitor room.

## 2020-09-08 NOTE — ASSESSMENT & PLAN NOTE
Advance Care Planning     Living Will  During this visit, I engaged the patient  in the advance care planning process.  The patient and I reviewed the role for advance directives and their purpose in directing future healthcare if the patient's unable to speak for him/herself.  At this point in time, the patient does have full decision-making capacity.  We discussed different extreme health states that she could experience, and reviewed what kind of medical care she would want in those situations.  The patient communicated that if she were comatose and had little chance of a meaningful recovery, she would want machines/life-sustaining treatments used.I spent a total of 17 minutes engaging the patient in this advance care planning discussion.

## 2020-09-08 NOTE — NURSING
Patient arrived to floor at this time via wheelchair. Nurse at side. Vital signs stable. Oriented to room. Personal belongings at bedside. IV access patent.  Pt verbalized understanding for reason of admission and plan of care. Assumed care at this time.

## 2020-09-08 NOTE — ASSESSMENT & PLAN NOTE
Tele monitoring.  Serial cardiac enzyme.  Check TSH.  Holding metoprolol.  Patient takes metoprolol 100 mg twice daily.  Consult Dr. Earl Dumont from Cardiology.  Follow 2D echocardiogram.

## 2020-09-08 NOTE — ED NOTES
Upon transfer to room 309, patient is AAOx4, no cardiac or respiratory complications. No needs or questions at this time.

## 2020-09-08 NOTE — CONSULTS
09/08/2020      Admit Date: 9/7/2020  Maribel Hernandez  New Patient Consult    Chief Complaint   Patient presents with    Shortness of Breath     tested + for covid 8/17 and had CXR from urgent care today that showed pneumonia to the right lower lobe       History of Present Illness:  A 39-year-old female with hypertension, COLIN, depression, anxiety, and morbid obesity who presents to the ED with shortness of breath on 09/07.  She was found to have COVID-19 on 08/17 and initially symptomatic with diarrhea, fever, and shortness of breath but had improved.  Patient endorses chronic shortness of breath which has been going on for about 6 months and progressively worsening- has discussed with PCP and attributed to her obesity.  She uses CPAP off and on for the past 1 year but is not very compliant with it. She has been participating [facility] loss program and plans to have weight loss surgery in the future.  At baseline was able to ambulate to mailbox which would make her short of breath.  She denies prior lung disease or asthma.  Around 8/18 she reports severe episode of palpitations with lightheadedness while seated in a vehicle which caused her to call EMS and has been experiencing mild palpitations occasionally since then.  She was supposed to see a cardiologist but has not been able to follow up due to her recent self quarantine.  She became more short of breath than baseline in the past several days with limitation to walking in her house.  She walked to the restroom on the night of admission and experienced severe shortness of breath associated with chest pressure.  She still has shortness of breath when ambulating in the room but denies fevers, chills, headache, diarrhea or any other symptoms.  Her appetite is improved.      PFSH:  Past Medical History:   Diagnosis Date    Anxiety     Depression     Hypertension     Obesity      Past Surgical History:   Procedure Laterality  "Date    CHOLECYSTECTOMY      HYSTERECTOMY       Social History     Tobacco Use    Smoking status: Never Smoker   Substance Use Topics    Alcohol use: No    Drug use: Not on file     Family History   Family history unknown: Yes     Review of patient's allergies indicates:  No Known Allergies    Performance Status:Performance Status:The patient's activity level is functions out of house.    Review of Systems:  a review of eleven systems covering constitutional, Psych, Eye, HEENT, Respiratory, Cardiac, GI, , Musculoskeletal, Endocrine, Dermatologicwas negative except the above mentioned abnormalities and for any pertinent findings as listed below:  Dry cough 2 days      Exam:Comprehensive exam done. /78 (BP Location: Left arm, Patient Position: Lying)   Pulse 76   Temp 97.5 °F (36.4 °C) (Oral)   Resp 16   Ht 5' 7" (1.702 m)   Wt (!) 180.5 kg (398 lb)   LMP 11/21/2013   SpO2 (!) 92%   Breastfeeding No   BMI 62.34 kg/m²   Exam included Vitals as listed, and patient's appearance and affect and alertness and mood, oral exam for yeast and hygiene and pharynx lesions and Mallapatti (M) score, neck with inspection for jvd and masses and thyroid abnormalities and lymph nodes (supraclavicular and infraclavicular nodes also examined and noted if abn), chest exam included symmetry and effort and fremitus and percussion and auscultation, cardiac exam included rhythm and gallops and murmur and rubs and jvd and edema, abdominal exam for mass and hepatosplenomegaly and tenderness and hernias and bowel sounds, Musculoskeletal exam with muscle tone and posture and mobility/gait and  strenght, and skin for rashes and cyanosis and pallor and turgor, extremity for clubbing.  Findings were normal except as listed below:  Morbidly obese  Nasal cannula oxygen at 3L with sat 97%  No acute distress  Breath sounds diminished  Heart rate regular  No edema    Radiographs reviewed: view by direct vision   CTA chest " 9/8/20- scattered bilateral interstitial infiltrates; phase of contrast limits evaluation for PE- awaiting radiologist read  CXR 09/07/2020- bilateral fluffy interstitial infiltrates with retrocardiac and left costophrenic opacification- possible consolidation versus atelectasis  CXR 8/18/20- mild bibasilar infiltrates otherwise clear    Labs     Recent Labs   Lab 09/08/20  0604   WBC 6.50   HGB 13.1   HCT 45.1   *     Recent Labs   Lab 09/07/20  1700 09/07/20  1728 09/07/20  1739 09/08/20  0604     --   --  144   K 4.0  --   --  3.8     --   --  103   CO2 27  --   --  28   BUN 9  --   --  11   CREATININE 1.0  --   --  1.0   *  --   --  116*   CALCIUM 9.4  --   --  9.2   MG  --   --   --  2.4   PHOS  --   --   --  3.7   AST 36  --   --  37   ALT 37  --   --  39   ALKPHOS 109  --   --  106   BILITOT 1.0  --   --  0.8   PROT 8.7*  --   --  8.0   ALBUMIN 3.5  --   --  3.2*   CRP 13.0*  --   --   --    SEDRATE  --   --  20  --    PROCAL  --  0.05  --   --    LACTATE  --  2.2  --   --    TROPONINI <0.006  --   --   --    CPK 57  --   --   --    BNP  --   --  <10  --    No results for input(s): PH, PCO2, PO2, HCO3 in the last 24 hours.  Microbiology Results (last 7 days)     Procedure Component Value Units Date/Time    Blood culture (site 2) [[id number]] Collected: 09/08/20 0605    Order Status: Sent Specimen: Blood from Peripheral, Left Hand Updated: 09/08/20 0605    Blood culture (site 1) [[id number]] Collected: 09/08/20 0604    Order Status: Sent Specimen: Blood from Antecubital, Right Updated: 09/08/20 0605          Impression:  Active Hospital Problems    Diagnosis  POA    *COVID-19 virus infection [U07.1]  Yes    Goals of care, counseling/discussion [Z71.89]  Not Applicable    Sinus pause [I45.5]  No    Class 3 severe obesity due to excess calories with serious comorbidity and body mass index (BMI) of 60.0 to 69.9 in adult [E66.01, Z68.44]  Not Applicable    Thrombocytosis [D47.3]  Yes     COLIN on CPAP [G47.33, Z99.89]  Not Applicable    Arthritis [M19.90]  Yes    Anxiety [F41.9]  Yes    Moderate episode of recurrent major depressive disorder [F33.1]  Yes    Essential hypertension [I10]  Yes      Resolved Hospital Problems   No resolved problems to display.               Plan:   COVID-19 pneumonia  Shortness of breath  COLIN on CPAP  Morbid obesity  HTN    - await final read of CTA chest  - with negative procalcitonin doubt secondary pneumonia  - symptoms suspected to be still from COVID-19 infection  - consider RDV- per hospitalist  - at present does not seem severe enough to benefit from convalescent plasma  - continue decadron  - continue diuresis  - recommend CPAP QHS while admitted  - BP control per hospitalist      Rosa Monroy MD  Pulmonary & Critical Care Medicine

## 2020-09-08 NOTE — ASSESSMENT & PLAN NOTE
Chronic, controlled.  Latest blood pressure and vitals reviewed-   Temp:  [96.4 °F (35.8 °C)-98.8 °F (37.1 °C)]   Pulse:  [64-86]   Resp:  [14-22]   BP: (123-169)/(65-98)   SpO2:  [92 %-96 %] .   Home meds for hypertension were reviewed and noted below. Hospital anti-hypertensive changes were made as shown below.  Hypertension Medications             cloNIDine 0.2 mg/24 hr td ptwk (CATAPRES) 0.2 mg/24 hr Place 1 patch onto the skin every 7 days.    furosemide (LASIX) 40 MG tablet Take 40 mg by mouth 2 (two) times daily.    metoprolol tartrate (LOPRESSOR) 50 MG tablet Take 100 mg by mouth 2 (two) times daily.     valsartan (DIOVAN) 160 MG tablet Take 320 mg by mouth 2 (two) times daily.       Hospital Medications             cloNIDine 0.2 mg/24 hr td ptwk 1 patch Starting on 9/8/2020. 1 patch, Transdermal, Every 7 days    furosemide tablet 40 mg 40 mg, Oral, 2 times daily    metoprolol tartrate (LOPRESSOR) tablet 100 mg 100 mg, Oral, 2 times daily    valsartan tablet 320 mg 320 mg, Oral, 2 times daily        Will treat with PRN antihypertensives, as needed, to maintain BP less than 180/110 or if patient becomes symptomatic.

## 2020-09-09 LAB
ALBUMIN SERPL BCP-MCNC: 3.3 G/DL (ref 3.5–5.2)
ALP SERPL-CCNC: 105 U/L (ref 55–135)
ALT SERPL W/O P-5'-P-CCNC: 42 U/L (ref 10–44)
ANION GAP SERPL CALC-SCNC: 11 MMOL/L (ref 8–16)
AORTIC ROOT ANNULUS: 3.03 CM
AORTIC VALVE CUSP SEPERATION: 2.11 CM
AST SERPL-CCNC: 23 U/L (ref 10–40)
AV INDEX (PROSTH): 0.92
AV MEAN GRADIENT: 4 MMHG
AV PEAK GRADIENT: 8 MMHG
AV VALVE AREA: 3.91 CM2
AV VELOCITY RATIO: 0.75
BASOPHILS # BLD AUTO: 0.02 K/UL (ref 0–0.2)
BASOPHILS NFR BLD: 0.2 % (ref 0–1.9)
BILIRUB SERPL-MCNC: 0.7 MG/DL (ref 0.1–1)
BSA FOR ECHO PROCEDURE: 2.92 M2
BUN SERPL-MCNC: 13 MG/DL (ref 6–20)
CALCIUM SERPL-MCNC: 9.2 MG/DL (ref 8.7–10.5)
CHLORIDE SERPL-SCNC: 102 MMOL/L (ref 95–110)
CK MB SERPL-MCNC: 0.4 NG/ML (ref 0.1–6.5)
CK MB SERPL-RTO: 0.9 % (ref 0–5)
CK SERPL-CCNC: 43 U/L (ref 20–180)
CO2 SERPL-SCNC: 27 MMOL/L (ref 23–29)
CREAT SERPL-MCNC: 0.9 MG/DL (ref 0.5–1.4)
CV ECHO LV RWT: 0.47 CM
DIFFERENTIAL METHOD: ABNORMAL
DOP CALC AO PEAK VEL: 1.41 M/S
DOP CALC AO VTI: 28.38 CM
DOP CALC LVOT AREA: 4.3 CM2
DOP CALC LVOT DIAMETER: 2.33 CM
DOP CALC LVOT PEAK VEL: 1.06 M/S
DOP CALC LVOT STROKE VOLUME: 110.85 CM3
DOP CALCLVOT PEAK VEL VTI: 26.01 CM
E WAVE DECELERATION TIME: 327.76 MSEC
E/A RATIO: 1.92
E/E' RATIO: 9.89 M/S
ECHO LV POSTERIOR WALL: 1.24 CM (ref 0.6–1.1)
EOSINOPHIL # BLD AUTO: 0 K/UL (ref 0–0.5)
EOSINOPHIL NFR BLD: 0.1 % (ref 0–8)
ERYTHROCYTE [DISTWIDTH] IN BLOOD BY AUTOMATED COUNT: 14 % (ref 11.5–14.5)
EST. GFR  (AFRICAN AMERICAN): >60 ML/MIN/1.73 M^2
EST. GFR  (NON AFRICAN AMERICAN): >60 ML/MIN/1.73 M^2
FRACTIONAL SHORTENING: 22 % (ref 28–44)
GLUCOSE SERPL-MCNC: 196 MG/DL (ref 70–110)
HCT VFR BLD AUTO: 45.1 % (ref 37–48.5)
HGB BLD-MCNC: 13.3 G/DL (ref 12–16)
IMM GRANULOCYTES # BLD AUTO: 0.09 K/UL (ref 0–0.04)
IMM GRANULOCYTES NFR BLD AUTO: 0.9 % (ref 0–0.5)
INTERVENTRICULAR SEPTUM: 1.21 CM (ref 0.6–1.1)
IVRT: 60.89 MSEC
LA MAJOR: 5.56 CM
LA MINOR: 5.11 CM
LA WIDTH: 3.31 CM
LEFT ATRIUM SIZE: 3.19 CM
LEFT ATRIUM VOLUME INDEX: 17.6 ML/M2
LEFT ATRIUM VOLUME: 47.8 CM3
LEFT INTERNAL DIMENSION IN SYSTOLE: 4.15 CM (ref 2.1–4)
LEFT VENTRICLE DIASTOLIC VOLUME INDEX: 50.37 ML/M2
LEFT VENTRICLE DIASTOLIC VOLUME: 136.44 ML
LEFT VENTRICLE MASS INDEX: 98 G/M2
LEFT VENTRICLE SYSTOLIC VOLUME INDEX: 28.2 ML/M2
LEFT VENTRICLE SYSTOLIC VOLUME: 76.39 ML
LEFT VENTRICULAR INTERNAL DIMENSION IN DIASTOLE: 5.32 CM (ref 3.5–6)
LEFT VENTRICULAR MASS: 265.62 G
LV LATERAL E/E' RATIO: 8.55 M/S
LV SEPTAL E/E' RATIO: 11.75 M/S
LYMPHOCYTES # BLD AUTO: 0.9 K/UL (ref 1–4.8)
LYMPHOCYTES NFR BLD: 8.3 % (ref 18–48)
MAGNESIUM SERPL-MCNC: 2.1 MG/DL (ref 1.6–2.6)
MCH RBC QN AUTO: 27.9 PG (ref 27–31)
MCHC RBC AUTO-ENTMCNC: 29.5 G/DL (ref 32–36)
MCV RBC AUTO: 95 FL (ref 82–98)
MONOCYTES # BLD AUTO: 0.4 K/UL (ref 0.3–1)
MONOCYTES NFR BLD: 3.4 % (ref 4–15)
MV A" WAVE DURATION": 145 MSEC
MV PEAK A VEL: 0.49 M/S
MV PEAK E VEL: 0.94 M/S
MV STENOSIS PRESSURE HALF TIME: 151.79 MS
MV VALVE AREA P 1/2 METHOD: 1.45 CM2
NEUTROPHILS # BLD AUTO: 9 K/UL (ref 1.8–7.7)
NEUTROPHILS NFR BLD: 87.1 % (ref 38–73)
NRBC BLD-RTO: 0 /100 WBC
PHOSPHATE SERPL-MCNC: 2.8 MG/DL (ref 2.7–4.5)
PISA TR MAX VEL: 1.87 M/S
PLATELET # BLD AUTO: 623 K/UL (ref 150–350)
PMV BLD AUTO: 9.5 FL (ref 9.2–12.9)
POTASSIUM SERPL-SCNC: 4.5 MMOL/L (ref 3.5–5.1)
PROT SERPL-MCNC: 8.2 G/DL (ref 6–8.4)
PULM VEIN A" WAVE DURATION": 151 MSEC
PV PEAK VELOCITY: 1.13 CM/S
RA MAJOR: 4.54 CM
RA PRESSURE: 3 MMHG
RA WIDTH: 2.36 CM
RBC # BLD AUTO: 4.77 M/UL (ref 4–5.4)
RIGHT VENTRICULAR END-DIASTOLIC DIMENSION: 1.96 CM
SODIUM SERPL-SCNC: 140 MMOL/L (ref 136–145)
TDI LATERAL: 0.11 M/S
TDI SEPTAL: 0.08 M/S
TDI: 0.1 M/S
TR MAX PG: 14 MMHG
TRICUSPID ANNULAR PLANE SYSTOLIC EXCURSION: 2.2 CM
TROPONIN I SERPL DL<=0.01 NG/ML-MCNC: <0.006 NG/ML (ref 0–0.03)
TV REST PULMONARY ARTERY PRESSURE: 17 MMHG
WBC # BLD AUTO: 10.28 K/UL (ref 3.9–12.7)

## 2020-09-09 PROCEDURE — G0378 HOSPITAL OBSERVATION PER HR: HCPCS

## 2020-09-09 PROCEDURE — 82550 ASSAY OF CK (CPK): CPT

## 2020-09-09 PROCEDURE — 25000003 PHARM REV CODE 250: Performed by: NURSE PRACTITIONER

## 2020-09-09 PROCEDURE — 27000221 HC OXYGEN, UP TO 24 HOURS

## 2020-09-09 PROCEDURE — 94761 N-INVAS EAR/PLS OXIMETRY MLT: CPT

## 2020-09-09 PROCEDURE — 84484 ASSAY OF TROPONIN QUANT: CPT

## 2020-09-09 PROCEDURE — 84100 ASSAY OF PHOSPHORUS: CPT

## 2020-09-09 PROCEDURE — 82553 CREATINE MB FRACTION: CPT

## 2020-09-09 PROCEDURE — 63600175 PHARM REV CODE 636 W HCPCS: Performed by: INTERNAL MEDICINE

## 2020-09-09 PROCEDURE — 27000190 HC CPAP FULL FACE MASK W/VALVE

## 2020-09-09 PROCEDURE — 63700000 PHARM REV CODE 250 ALT 637 W/O HCPCS: Performed by: NURSE PRACTITIONER

## 2020-09-09 PROCEDURE — 99214 OFFICE O/P EST MOD 30 MIN: CPT | Mod: ,,, | Performed by: INTERNAL MEDICINE

## 2020-09-09 PROCEDURE — 94660 CPAP INITIATION&MGMT: CPT

## 2020-09-09 PROCEDURE — 85025 COMPLETE CBC W/AUTO DIFF WBC: CPT

## 2020-09-09 PROCEDURE — 25000003 PHARM REV CODE 250: Performed by: INTERNAL MEDICINE

## 2020-09-09 PROCEDURE — 80053 COMPREHEN METABOLIC PANEL: CPT

## 2020-09-09 PROCEDURE — 36415 COLL VENOUS BLD VENIPUNCTURE: CPT

## 2020-09-09 PROCEDURE — 99214 PR OFFICE/OUTPT VISIT, EST, LEVL IV, 30-39 MIN: ICD-10-PCS | Mod: ,,, | Performed by: INTERNAL MEDICINE

## 2020-09-09 PROCEDURE — 83735 ASSAY OF MAGNESIUM: CPT

## 2020-09-09 PROCEDURE — 96372 THER/PROPH/DIAG INJ SC/IM: CPT | Mod: 59 | Performed by: EMERGENCY MEDICINE

## 2020-09-09 RX ORDER — METOPROLOL TARTRATE 50 MG/1
50 TABLET ORAL 2 TIMES DAILY
Status: DISCONTINUED | OUTPATIENT
Start: 2020-09-09 | End: 2020-09-10 | Stop reason: HOSPADM

## 2020-09-09 RX ADMIN — FUROSEMIDE 40 MG: 40 TABLET ORAL at 08:09

## 2020-09-09 RX ADMIN — Medication 1000 UNITS: at 08:09

## 2020-09-09 RX ADMIN — THERA TABS 1 TABLET: TAB at 08:09

## 2020-09-09 RX ADMIN — ENOXAPARIN SODIUM 40 MG: 40 INJECTION SUBCUTANEOUS at 08:09

## 2020-09-09 RX ADMIN — DEXAMETHASONE 6 MG: 4 TABLET ORAL at 08:09

## 2020-09-09 RX ADMIN — FLUOXETINE 40 MG: 20 CAPSULE ORAL at 08:09

## 2020-09-09 RX ADMIN — METOPROLOL TARTRATE 50 MG: 50 TABLET, FILM COATED ORAL at 09:09

## 2020-09-09 RX ADMIN — OXYCODONE HYDROCHLORIDE AND ACETAMINOPHEN 500 MG: 500 TABLET ORAL at 08:09

## 2020-09-09 RX ADMIN — PANTOPRAZOLE SODIUM 40 MG: 40 TABLET, DELAYED RELEASE ORAL at 08:09

## 2020-09-09 RX ADMIN — HYDROCODONE BITARTRATE AND ACETAMINOPHEN 1 TABLET: 10; 325 TABLET ORAL at 03:09

## 2020-09-09 RX ADMIN — VALSARTAN 320 MG: 80 TABLET ORAL at 08:09

## 2020-09-09 RX ADMIN — METOPROLOL TARTRATE 50 MG: 50 TABLET, FILM COATED ORAL at 08:09

## 2020-09-09 RX ADMIN — AZITHROMYCIN MONOHYDRATE 500 MG: 250 TABLET ORAL at 08:09

## 2020-09-09 NOTE — RESPIRATORY THERAPY
09/08/20 1941   Patient Assessment/Suction   Level of Consciousness (AVPU) alert   Respiratory Effort Unlabored   PRE-TX-O2   O2 Device (Oxygen Therapy) room air   SpO2 97 %   Pulse Oximetry Type Continuous   $ Pulse Oximetry - Multiple Charge Pulse Oximetry - Multiple

## 2020-09-09 NOTE — ASSESSMENT & PLAN NOTE
- COVID-19 testing   - Infection Control notified     - Isolation:   - Airborne, Contact and Droplet Precautions  - Cohort patients into COVID units  - N95 mask, wear eye protection  - 20 second hand hygiene              - Limit visitors per hospital policy              - Consolidating lab draws, nursing care, provider visits, and interventions    - Diagnostics: (leukopenia, hyponatremia, hyperferritinemia, elevated troponin, elevated d-dimer, age, and comorbidities are significant predictors of poor clinical outcome)  CBC, CMP, Procalcitonin, Ferritin, CRP, LDH, BNP, Troponin, ECG, Blood Culture x2, Portable CXR and UA and culture    - Management:  Supplemental O2 to maintain SpO2 >92%  Telemetry  Continuous/intermittent Pulse Ox  Careful use of steroids in the absence of other indications - unless septic shock due to increased viral replication Fluid sparing resuscitation and Empiric antibiotics per likely source & patient allergies if patient hypoxic with airspace disease as 1 time doses CAP: azithromycin & ceftriaxone   Continue intravenous Rocephin and azithromycin.  Continue CPAP use.  Consult pulmonary medicine.  Remdesivir committee does not feel patient would benefit with Remdesivir now.  On dexamethasone 6 mg daily for 10 days.    Advance Care Planning   FULL CODE

## 2020-09-09 NOTE — PLAN OF CARE
Cm completed the assessment with pt via phone. Pt is on COVID + unit.  Pt lives at home with her adult children.  PCP is Graciela Mercer MD.  Insurance verified as Medicaid. Pt denies diabetes, dialysis and coumadin.  Disposition:  Pt will discharge to home. No needs verbalized at this time.    Pt has CPAP, no other dme.         09/09/20 1524   Discharge Assessment   Assessment Type Discharge Planning Assessment   Confirmed/corrected address and phone number on facesheet? Yes   Assessment information obtained from? Patient   Expected Length of Stay (days) 3   Communicated expected length of stay with patient/caregiver yes   Prior to hospitilization cognitive status: Alert/Oriented   Prior to hospitalization functional status: Independent   Current cognitive status: Alert/Oriented   Current Functional Status: Independent   Facility Arrived From: home   Lives With child(mary), adult   Able to Return to Prior Arrangements yes   Is patient able to care for self after discharge? Yes   Who are your caregiver(s) and their phone number(s)? [employer] - [phone]   Patient's perception of discharge disposition home or selfcare   Readmission Within the Last 30 Days no previous admission in last 30 days   Patient currently being followed by outpatient case management? No   Patient currently receives any other outside agency services? No   Equipment Currently Used at Home CPAP   Do you have any problems affording any of your prescribed medications? No   Is the patient taking medications as prescribed? yes   Does the patient have transportation home? Yes   Transportation Anticipated family or friend will provide   Dialysis Name and Scheduled days na   Does the patient receive services at the Coumadin Clinic? No   Discharge Plan A Home with family   Discharge Plan B Home with family   DME Needed Upon Discharge  none   Patient/Family in Agreement with Plan yes

## 2020-09-09 NOTE — PROGRESS NOTES
The patient has had no further pauses.  Sinus bradycardia at 44 B p.m. at 3:22 a.m. during sleep.  Continue present management.

## 2020-09-09 NOTE — PLAN OF CARE
Patient alert and oriented.  Full code.  Sinus rhythm on cardiac   PRN medication given for anxiety, See MAR.  Call light in reach.   Bed in low/locked position.  Bed alarm set, patient verbal understanding.

## 2020-09-09 NOTE — ASSESSMENT & PLAN NOTE
Tele monitoring.  Rule out acute coronary syndrome  Resuming Metoprolol 50 mg BID.  Appreciate Dr. Earl Dumont recommendations.Follow 2D echocardiogram.

## 2020-09-09 NOTE — PROGRESS NOTES
"[facility] Hospital Medicine  Progress Note    Patient Name: Maribel Hernandez  MRN: [id number]  Patient Class: OP- Observation   Admission Date: 9/7/2020  Length of Stay: 0 days  Attending Physician: Shiela Lynn MD  Primary Care Provider: Graciela Mercer NP        Subjective:     Principal Problem:COVID-19 virus infection        HPI:  Maribel Hernandez is a 39-year-old female with a past medical history of hypertension, COLIN, depression, anxiety, and morbid obesity who presents to the emergency room tonight with reports of shortness of breath.  Patient states on August 18, 2020 she tested COVID positive at the urgent care.  Patient states she has been in self quarantine ever since.  Patient states initially her symptoms included diarrhea, fever, and shortness of breath but have since subsided.  Patient reports she has been asymptomatic and afebrile for approximately 1 week.  Patient denies being prescribed antibiotic therapy during her initial phase of COVID.  Patient states this morning she woke up with shortness of breath accompanied by dyspnea on exertion.  Patient also endorses weakness and decreased energy.  Patient states It feels like someone was sitting on my chest."  Denies chest pain or chest pressure.  Patient states she was seen at the urgent care this morning in which a chest x-ray was obtained and was told she has pneumonia and instructed to come to the emergency room.  In the emergency room patient noted to be mildly hypoxic on room air and COVID positive.  Patient placed in observation on 09/07/2020 at approximately 7:30 p.m..  Patient reports that she lives at home with her daughter, denies the use of supplemental oxygen, reports compliance with nightly CPAP, denies use of ambulatory assist devices.  Full code status verified upon admission to the hospital.    Overview/Hospital Course:  No notes on file    Interval History:  Morbidly obese female, reporting " shortness of breath, presently requiring 2 liters/minute supplemental oxygen via nasal cannula.  No chest pain reported.  Patient uses CPAP at home for sleep apnea.  Patient denies any cough or expectoration.  Presently afebrile.    Review of Systems   Constitutional: Positive for activity change and fatigue. Negative for appetite change, chills and fever.   HENT: Negative for congestion, sinus pressure, sinus pain and sore throat.    Eyes: Negative for photophobia and visual disturbance.   Respiratory: Positive for shortness of breath. Negative for cough, choking, chest tightness and wheezing.    Cardiovascular: Negative for chest pain, palpitations and leg swelling.   Gastrointestinal: Positive for nausea. Negative for abdominal distention, abdominal pain, blood in stool, constipation, diarrhea and vomiting.   Genitourinary: Negative for difficulty urinating, dysuria, flank pain and hematuria.   Musculoskeletal: Positive for gait problem. Negative for back pain and joint swelling.        Chronic     Skin: Negative for rash and wound.   Neurological: Positive for weakness. Negative for dizziness, syncope, light-headedness, numbness and headaches.   Psychiatric/Behavioral: Negative for confusion. The patient is nervous/anxious.      Objective:     Vital Signs (Most Recent):  Temp: 97.6 °F (36.4 °C) (09/09/20 0256)  Pulse: 73 (09/09/20 0834)  Resp: 18 (09/09/20 0834)  BP: 135/82 (09/09/20 0256)  SpO2: 95 % (09/09/20 0834) Vital Signs (24h Range):  Temp:  [96.4 °F (35.8 °C)-97.6 °F (36.4 °C)] 97.6 °F (36.4 °C)  Pulse:  [62-74] 73  Resp:  [16-18] 18  SpO2:  [91 %-97 %] 95 %  BP: (128-135)/(81-86) 135/82     Weight: (!) 180.5 kg (397 lb 14.9 oz)  Body mass index is 62.32 kg/m².    Intake/Output Summary (Last 24 hours) at 9/9/2020 0837  Last data filed at 9/9/2020 0500  Gross per 24 hour   Intake 1330 ml   Output 550 ml   Net 780 ml      Physical Exam  Vitals signs and nursing note reviewed.   Constitutional:        General: She is not in acute distress.     Appearance: She is well-developed. She is obese. She is not diaphoretic.   HENT:      Head: Normocephalic and atraumatic.   Eyes:      Pupils: Pupils are equal, round, and reactive to light.      Comments: Eyeglasses in use     Neck:      Musculoskeletal: Normal range of motion.      Vascular: No JVD.   Cardiovascular:      Rate and Rhythm: Normal rate and regular rhythm.   Pulmonary:      Effort: Pulmonary effort is normal. No respiratory distress.      Comments: Patient on room air during my initial interview and physical exam, patient in no acute respiratory distress.    Chest:      Chest wall: No tenderness.   Abdominal:      General: There is no distension.      Palpations: Abdomen is soft.      Tenderness: There is no abdominal tenderness. There is no guarding or rebound.   Genitourinary:     Comments: Exam Deferred     Musculoskeletal: Normal range of motion.         General: Swelling present. No tenderness or deformity.   Skin:     General: Skin is warm and dry.      Capillary Refill: Capillary refill takes 2 to 3 seconds.      Findings: No erythema or rash.   Neurological:      Mental Status: She is alert and oriented to person, place, and time.      Cranial Nerves: No cranial nerve deficit.      Sensory: No sensory deficit.   Psychiatric:         Behavior: Behavior normal.         Thought Content: Thought content normal.         Judgment: Judgment normal.         Significant Labs:   CBC:   Recent Labs   Lab 09/07/20  1700 09/08/20  0604 09/09/20  0649   WBC 7.55 6.50 10.28   HGB 14.0 13.1 13.3   HCT 46.3 45.1 45.1   * 518* 623*     CMP:   Recent Labs   Lab 09/07/20  1700 09/08/20  0604    144   K 4.0 3.8    103   CO2 27 28   * 116*   BUN 9 11   CREATININE 1.0 1.0   CALCIUM 9.4 9.2   PROT 8.7* 8.0   ALBUMIN 3.5 3.2*   BILITOT 1.0 0.8   ALKPHOS 109 106   AST 36 37   ALT 37 39   ANIONGAP 14 13   EGFRNONAA >60 >60     Microbiology Results  (last 7 days)     Procedure Component Value Units Date/Time    Blood culture (site 2) [[id number]] Collected: 09/08/20 0605    Order Status: Completed Specimen: Blood from Peripheral, Left Hand Updated: 09/08/20 2115     Blood Culture, Routine No Growth to date    Narrative:      Site # 2, aerobic only    Blood culture (site 1) [[id number]] Collected: 09/08/20 0604    Order Status: Completed Specimen: Blood from Antecubital, Right Updated: 09/08/20 2115     Blood Culture, Routine No Growth to date    Narrative:      Site # 1, aerobic and anaerobic        Significant Imaging:  CXR: Pulmonary edema or pneumonia in the bilateral lung bases, new since prior.    ECHO: Pending.        Assessment/Plan:      * COVID-19 virus infection  - COVID-19 testing   - Infection Control notified     - Isolation:   - Airborne, Contact and Droplet Precautions  - Cohort patients into COVID units  - N95 mask, wear eye protection  - 20 second hand hygiene              - Limit visitors per hospital policy              - Consolidating lab draws, nursing care, provider visits, and interventions    - Diagnostics: (leukopenia, hyponatremia, hyperferritinemia, elevated troponin, elevated d-dimer, age, and comorbidities are significant predictors of poor clinical outcome)  CBC, CMP, Procalcitonin, Ferritin, CRP, LDH, BNP, Troponin, ECG, Blood Culture x2, Portable CXR and UA and culture    - Management:  Supplemental O2 to maintain SpO2 >92%  Telemetry  Continuous/intermittent Pulse Ox  Careful use of steroids in the absence of other indications - unless septic shock due to increased viral replication Fluid sparing resuscitation and Empiric antibiotics per likely source & patient allergies if patient hypoxic with airspace disease as 1 time doses CAP: azithromycin & ceftriaxone   Continue intravenous Rocephin and azithromycin.  Continue CPAP use.  Consult pulmonary medicine.  Remdesivir committee does not feel patient would benefit with Remdesivir  now.  On dexamethasone 6 mg daily for 10 days.    Advance Care Planning   FULL CODE                Sinus pause  Tele monitoring.  Rule out acute coronary syndrome  Resuming Metoprolol 50 mg BID.  Appreciate Dr. Earl Dumont recommendations.Follow 2D echocardiogram.      Goals of care, counseling/discussion  Advance Care Planning     Living Will  During this visit, I engaged the patient  in the advance care planning process.  The patient and I reviewed the role for advance directives and their purpose in directing future healthcare if the patient's unable to speak for him/herself.  At this point in time, the patient does have full decision-making capacity.  We discussed different extreme health states that she could experience, and reviewed what kind of medical care she would want in those situations.  The patient communicated that if she were comatose and had little chance of a meaningful recovery, she would want machines/life-sustaining treatments used.I spent a total of 17 minutes engaging the patient in this advance care planning discussion.            Essential hypertension  Chronic, controlled.  Latest blood pressure and vitals reviewed-   Temp:  [96.4 °F (35.8 °C)-98.8 °F (37.1 °C)]   Pulse:  [64-86]   Resp:  [14-22]   BP: (123-169)/(65-98)   SpO2:  [92 %-96 %] .   Home meds for hypertension were reviewed and noted below. Hospital anti-hypertensive changes were made as shown below.  Hypertension Medications             cloNIDine 0.2 mg/24 hr td ptwk (CATAPRES) 0.2 mg/24 hr Place 1 patch onto the skin every 7 days.    furosemide (LASIX) 40 MG tablet Take 40 mg by mouth 2 (two) times daily.    metoprolol tartrate (LOPRESSOR) 50 MG tablet Take 100 mg by mouth 2 (two) times daily.     valsartan (DIOVAN) 160 MG tablet Take 320 mg by mouth 2 (two) times daily.       Hospital Medications             cloNIDine 0.2 mg/24 hr td ptwk 1 patch Starting on 9/8/2020. 1 patch, Transdermal, Every 7 days    furosemide tablet  40 mg 40 mg, Oral, 2 times daily    metoprolol tartrate (LOPRESSOR) tablet 100 mg 100 mg, Oral, 2 times daily    valsartan tablet 320 mg 320 mg, Oral, 2 times daily        Will treat with PRN antihypertensives, as needed, to maintain BP less than 180/110 or if patient becomes symptomatic.          Moderate episode of recurrent major depressive disorder  Chronic, controlled. Will continue home Prozac.        Anxiety  Chronic, controlled. Will continue home Xanax as prescribed PRN.        Arthritis  Chronic, controlled. Will continue PRN Norco.  reviewed.        COLIN on CPAP  Patient reports compliance with nightly CPAP use, hold CPAP use due to COVID diagnosis.         Thrombocytosis  Noted, will trend with daily CBC.    Class 3 severe obesity due to excess calories with serious comorbidity and body mass index (BMI) of 60.0 to 69.9 in adult  Body mass index is 62.34 kg/m². Morbid obesity complicates all aspects of disease management from diagnostic modalities to treatment. Weight loss encouraged and health benefits explained to patient. Nutritional counseling and physical activity encouraged.           VTE Risk Mitigation (From admission, onward)         Ordered     enoxaparin injection 40 mg  Every 12 hours      09/08/20 1012     IP VTE HIGH RISK PATIENT  Once      09/07/20 2041     Place sequential compression device  Until discontinued      09/07/20 2041     Place AZAR hose  Until discontinued      09/07/20 2041                Discharge Planning   BUDDY: 09-10-20    Code Status: Full Code   Is the patient medically ready for discharge?:     Reason for patient still in hospital (select all that apply): Patient trending condition         Shiela Lynn MD  Department of Hospital Medicine   [facility]

## 2020-09-09 NOTE — SUBJECTIVE & OBJECTIVE
Interval History:  Morbidly obese female, reporting shortness of breath, presently requiring 2 liters/minute supplemental oxygen via nasal cannula.  No chest pain reported.  Patient uses CPAP at home for sleep apnea.  Patient denies any cough or expectoration.  Presently afebrile.    Review of Systems   Constitutional: Positive for activity change and fatigue. Negative for appetite change, chills and fever.   HENT: Negative for congestion, sinus pressure, sinus pain and sore throat.    Eyes: Negative for photophobia and visual disturbance.   Respiratory: Positive for shortness of breath. Negative for cough, choking, chest tightness and wheezing.    Cardiovascular: Negative for chest pain, palpitations and leg swelling.   Gastrointestinal: Positive for nausea. Negative for abdominal distention, abdominal pain, blood in stool, constipation, diarrhea and vomiting.   Genitourinary: Negative for difficulty urinating, dysuria, flank pain and hematuria.   Musculoskeletal: Positive for gait problem. Negative for back pain and joint swelling.        Chronic     Skin: Negative for rash and wound.   Neurological: Positive for weakness. Negative for dizziness, syncope, light-headedness, numbness and headaches.   Psychiatric/Behavioral: Negative for confusion. The patient is nervous/anxious.      Objective:     Vital Signs (Most Recent):  Temp: 97.6 °F (36.4 °C) (09/09/20 0256)  Pulse: 73 (09/09/20 0834)  Resp: 18 (09/09/20 0834)  BP: 135/82 (09/09/20 0256)  SpO2: 95 % (09/09/20 0834) Vital Signs (24h Range):  Temp:  [96.4 °F (35.8 °C)-97.6 °F (36.4 °C)] 97.6 °F (36.4 °C)  Pulse:  [62-74] 73  Resp:  [16-18] 18  SpO2:  [91 %-97 %] 95 %  BP: (128-135)/(81-86) 135/82     Weight: (!) 180.5 kg (397 lb 14.9 oz)  Body mass index is 62.32 kg/m².    Intake/Output Summary (Last 24 hours) at 9/9/2020 0837  Last data filed at 9/9/2020 0500  Gross per 24 hour   Intake 1330 ml   Output 550 ml   Net 780 ml      Physical Exam  Vitals signs and  nursing note reviewed.   Constitutional:       General: She is not in acute distress.     Appearance: She is well-developed. She is obese. She is not diaphoretic.   HENT:      Head: Normocephalic and atraumatic.   Eyes:      Pupils: Pupils are equal, round, and reactive to light.      Comments: Eyeglasses in use     Neck:      Musculoskeletal: Normal range of motion.      Vascular: No JVD.   Cardiovascular:      Rate and Rhythm: Normal rate and regular rhythm.   Pulmonary:      Effort: Pulmonary effort is normal. No respiratory distress.      Comments: Patient on room air during my initial interview and physical exam, patient in no acute respiratory distress.    Chest:      Chest wall: No tenderness.   Abdominal:      General: There is no distension.      Palpations: Abdomen is soft.      Tenderness: There is no abdominal tenderness. There is no guarding or rebound.   Genitourinary:     Comments: Exam Deferred     Musculoskeletal: Normal range of motion.         General: Swelling present. No tenderness or deformity.   Skin:     General: Skin is warm and dry.      Capillary Refill: Capillary refill takes 2 to 3 seconds.      Findings: No erythema or rash.   Neurological:      Mental Status: She is alert and oriented to person, place, and time.      Cranial Nerves: No cranial nerve deficit.      Sensory: No sensory deficit.   Psychiatric:         Behavior: Behavior normal.         Thought Content: Thought content normal.         Judgment: Judgment normal.         Significant Labs:   CBC:   Recent Labs   Lab 09/07/20  1700 09/08/20  0604 09/09/20  0649   WBC 7.55 6.50 10.28   HGB 14.0 13.1 13.3   HCT 46.3 45.1 45.1   * 518* 623*     CMP:   Recent Labs   Lab 09/07/20  1700 09/08/20  0604    144   K 4.0 3.8    103   CO2 27 28   * 116*   BUN 9 11   CREATININE 1.0 1.0   CALCIUM 9.4 9.2   PROT 8.7* 8.0   ALBUMIN 3.5 3.2*   BILITOT 1.0 0.8   ALKPHOS 109 106   AST 36 37   ALT 37 39   ANIONGAP 14 13    EGFRNONAA >60 >60     Microbiology Results (last 7 days)     Procedure Component Value Units Date/Time    Blood culture (site 2) [[id number]] Collected: 09/08/20 0605    Order Status: Completed Specimen: Blood from Peripheral, Left Hand Updated: 09/08/20 2115     Blood Culture, Routine No Growth to date    Narrative:      Site # 2, aerobic only    Blood culture (site 1) [[id number]] Collected: 09/08/20 0604    Order Status: Completed Specimen: Blood from Antecubital, Right Updated: 09/08/20 2115     Blood Culture, Routine No Growth to date    Narrative:      Site # 1, aerobic and anaerobic        Significant Imaging:  CXR: Pulmonary edema or pneumonia in the bilateral lung bases, new since prior.    ECHO: Pending.

## 2020-09-09 NOTE — PLAN OF CARE
NAD noted. POC reviewed w pt and they verbalized understanding.  Tele  [id number].  Pt free from falls.  Pt ambulated to the bathroom.  Bed in low position, side rails up X2, bed alarm on, wheels locked, call light in reach. Will continue to monitor.

## 2020-09-09 NOTE — RESPIRATORY THERAPY
09/09/20 0834   PRE-TX-O2   O2 Device (Oxygen Therapy) nasal cannula   $ Is the patient on Low Flow Oxygen? Yes   Flow (L/min) 2   SpO2 95 %   Pulse Oximetry Type Intermittent   $ Pulse Oximetry - Multiple Charge Pulse Oximetry - Multiple   Pulse 73   Resp 18

## 2020-09-09 NOTE — PROGRESS NOTES
Progress Note  PULMONARY    Admit Date: 9/7/2020 09/09/2020      SUBJECTIVE:     9/9/20- no new complaints.  Saturating in 90s on 2 L nasal cannula      OBJECTIVE:     Vitals (Most recent):  Vitals:    09/09/20 0849   BP: (!) 165/84   Pulse: 74   Resp: 18   Temp: 97.7 °F (36.5 °C)       Vitals (24 hour range):  Temp:  [96.4 °F (35.8 °C)-97.7 °F (36.5 °C)]   Pulse:  [62-74]   Resp:  [16-18]   BP: (128-165)/(81-86)   SpO2:  [91 %-97 %]       Intake/Output Summary (Last 24 hours) at 9/9/2020 1026  Last data filed at 9/9/2020 0500  Gross per 24 hour   Intake 1330 ml   Output 550 ml   Net 780 ml          Physical Exam:  The patient's neuro status (alertness,orientation,cognitive function,motor skills,), pharyngeal exam (oral lesions, hygiene, abn dentition,), Neck (jvd,mass,thyroid,nodes in neck and above/below clavicle),RESPIRATORY(symmetry,effort,fremitus,percussion,auscultation),  Cor(rhythm,heart tones including gallops,perfusion,edema)ABD(distention,hepatic&splenomegaly,tenderness,masses), Skin(rash,cyanosis),Psyc(affect,judgement,).  Exam negative except for these pertinent findings:    Obese,   Excessively sleepy  Arouses, answers questions  Lungs clear   Abdomen distended  No edema    Radiographs reviewed: view by direct vision   CTA chest 9/8/20- scattered bilateral interstitial infiltrates; phase of contrast limits evaluation for PE- per final read no PE  CXR 09/07/2020- bilateral fluffy interstitial infiltrates with retrocardiac and left costophrenic opacification- possible consolidation versus atelectasis  CXR 8/18/20- mild bibasilar infiltrates otherwise clear    Labs     Recent Labs   Lab 09/09/20  0649   WBC 10.28   HGB 13.3   HCT 45.1   *     Recent Labs   Lab 09/09/20  0648 09/09/20  0649     --    K 4.5  --      --    CO2 27  --    BUN 13  --    CREATININE 0.9  --    *  --    CALCIUM 9.2  --    MG 2.1  --    PHOS 2.8  --    AST 23  --    ALT 42  --    ALKPHOS 105  --     BILITOT 0.7  --    PROT 8.2  --    ALBUMIN 3.3*  --    TROPONINI  --  <0.006   CPK  --  43   CPKMB  --  0.4   MB  --  0.9   No results for input(s): PH, PCO2, PO2, HCO3 in the last 24 hours.  Microbiology Results (last 7 days)     Procedure Component Value Units Date/Time    Blood culture (site 2) [[id number]] Collected: 09/08/20 0605    Order Status: Completed Specimen: Blood from Peripheral, Left Hand Updated: 09/08/20 2115     Blood Culture, Routine No Growth to date    Narrative:      Site # 2, aerobic only    Blood culture (site 1) [[id number]] Collected: 09/08/20 0604    Order Status: Completed Specimen: Blood from Antecubital, Right Updated: 09/08/20 2115     Blood Culture, Routine No Growth to date    Narrative:      Site # 1, aerobic and anaerobic          Impression:  Active Hospital Problems    Diagnosis  POA    *COVID-19 virus infection [U07.1]  Yes    Goals of care, counseling/discussion [Z71.89]  Not Applicable    Sinus pause [I45.5]  No    Class 3 severe obesity due to excess calories with serious comorbidity and body mass index (BMI) of 60.0 to 69.9 in adult [E66.01, Z68.44]  Not Applicable    Thrombocytosis [D47.3]  Yes    COLIN on CPAP [G47.33, Z99.89]  Not Applicable    Arthritis [M19.90]  Yes    Anxiety [F41.9]  Yes    Moderate episode of recurrent major depressive disorder [F33.1]  Yes    Essential hypertension [I10]  Yes      Resolved Hospital Problems   No resolved problems to display.               Plan:   COVID-19 pneumonia  Shortness of breath  COLIN on CPAP  Morbid obesity  HTN       - with negative procalcitonin doubt secondary pneumonia  - symptoms suspected to be still from lingering COVID-19 infection however minimal O2 reqt, inflammatory markers not elevated, afebrile. Suspect low risk for further worsening.  - her recent complaints w/ palpitations and presyncope sound more cardiac in nature- await echo read  - continue decadron  - continue diuresis  - recommend CPAP QHS and  with naps- transfer to negative pressure room now.  Discussed with charge nurse and placed order   - BP control per hospitalist  - out of bed daily, consider PT to help with deconditioning    Rosa Monroy MD  Pulmonary & Critical Care Medicine                                        .

## 2020-09-10 VITALS
HEIGHT: 67 IN | OXYGEN SATURATION: 96 % | WEIGHT: 293 LBS | RESPIRATION RATE: 18 BRPM | DIASTOLIC BLOOD PRESSURE: 94 MMHG | HEART RATE: 59 BPM | BODY MASS INDEX: 45.99 KG/M2 | SYSTOLIC BLOOD PRESSURE: 158 MMHG | TEMPERATURE: 97 F

## 2020-09-10 LAB
ALBUMIN SERPL BCP-MCNC: 3.4 G/DL (ref 3.5–5.2)
ALP SERPL-CCNC: 125 U/L (ref 55–135)
ALT SERPL W/O P-5'-P-CCNC: 41 U/L (ref 10–44)
ANION GAP SERPL CALC-SCNC: 10 MMOL/L (ref 8–16)
AST SERPL-CCNC: 29 U/L (ref 10–40)
BASOPHILS # BLD AUTO: 0.01 K/UL (ref 0–0.2)
BASOPHILS NFR BLD: 0.1 % (ref 0–1.9)
BILIRUB SERPL-MCNC: 0.7 MG/DL (ref 0.1–1)
BUN SERPL-MCNC: 15 MG/DL (ref 6–20)
CALCIUM SERPL-MCNC: 9.7 MG/DL (ref 8.7–10.5)
CHLORIDE SERPL-SCNC: 102 MMOL/L (ref 95–110)
CO2 SERPL-SCNC: 28 MMOL/L (ref 23–29)
CREAT SERPL-MCNC: 0.9 MG/DL (ref 0.5–1.4)
D DIMER PPP IA.FEU-MCNC: 0.26 MG/L FEU
DIFFERENTIAL METHOD: ABNORMAL
EOSINOPHIL # BLD AUTO: 0.1 K/UL (ref 0–0.5)
EOSINOPHIL NFR BLD: 0.5 % (ref 0–8)
ERYTHROCYTE [DISTWIDTH] IN BLOOD BY AUTOMATED COUNT: 14.2 % (ref 11.5–14.5)
EST. GFR  (AFRICAN AMERICAN): >60 ML/MIN/1.73 M^2
EST. GFR  (NON AFRICAN AMERICAN): >60 ML/MIN/1.73 M^2
GLUCOSE SERPL-MCNC: 128 MG/DL (ref 70–110)
HCT VFR BLD AUTO: 44.2 % (ref 37–48.5)
HGB BLD-MCNC: 13.1 G/DL (ref 12–16)
IMM GRANULOCYTES # BLD AUTO: 0.1 K/UL (ref 0–0.04)
IMM GRANULOCYTES NFR BLD AUTO: 0.9 % (ref 0–0.5)
LYMPHOCYTES # BLD AUTO: 1.5 K/UL (ref 1–4.8)
LYMPHOCYTES NFR BLD: 13.6 % (ref 18–48)
MAGNESIUM SERPL-MCNC: 2.3 MG/DL (ref 1.6–2.6)
MCH RBC QN AUTO: 28.5 PG (ref 27–31)
MCHC RBC AUTO-ENTMCNC: 29.6 G/DL (ref 32–36)
MCV RBC AUTO: 96 FL (ref 82–98)
MONOCYTES # BLD AUTO: 0.7 K/UL (ref 0.3–1)
MONOCYTES NFR BLD: 6.3 % (ref 4–15)
NEUTROPHILS # BLD AUTO: 8.7 K/UL (ref 1.8–7.7)
NEUTROPHILS NFR BLD: 78.6 % (ref 38–73)
NRBC BLD-RTO: 0 /100 WBC
PHOSPHATE SERPL-MCNC: 3.3 MG/DL (ref 2.7–4.5)
PLATELET # BLD AUTO: 569 K/UL (ref 150–350)
PMV BLD AUTO: 9.6 FL (ref 9.2–12.9)
POTASSIUM SERPL-SCNC: 4 MMOL/L (ref 3.5–5.1)
PROT SERPL-MCNC: 8 G/DL (ref 6–8.4)
RBC # BLD AUTO: 4.59 M/UL (ref 4–5.4)
SODIUM SERPL-SCNC: 140 MMOL/L (ref 136–145)
WBC # BLD AUTO: 11 K/UL (ref 3.9–12.7)

## 2020-09-10 PROCEDURE — 25000003 PHARM REV CODE 250: Performed by: NURSE PRACTITIONER

## 2020-09-10 PROCEDURE — 94660 CPAP INITIATION&MGMT: CPT

## 2020-09-10 PROCEDURE — 80053 COMPREHEN METABOLIC PANEL: CPT

## 2020-09-10 PROCEDURE — 99900035 HC TECH TIME PER 15 MIN (STAT)

## 2020-09-10 PROCEDURE — 83735 ASSAY OF MAGNESIUM: CPT

## 2020-09-10 PROCEDURE — 94761 N-INVAS EAR/PLS OXIMETRY MLT: CPT

## 2020-09-10 PROCEDURE — 25000003 PHARM REV CODE 250: Performed by: INTERNAL MEDICINE

## 2020-09-10 PROCEDURE — 84100 ASSAY OF PHOSPHORUS: CPT

## 2020-09-10 PROCEDURE — 36415 COLL VENOUS BLD VENIPUNCTURE: CPT

## 2020-09-10 PROCEDURE — 99214 OFFICE O/P EST MOD 30 MIN: CPT | Mod: ,,, | Performed by: INTERNAL MEDICINE

## 2020-09-10 PROCEDURE — 27000221 HC OXYGEN, UP TO 24 HOURS

## 2020-09-10 PROCEDURE — 97161 PT EVAL LOW COMPLEX 20 MIN: CPT

## 2020-09-10 PROCEDURE — 85025 COMPLETE CBC W/AUTO DIFF WBC: CPT

## 2020-09-10 PROCEDURE — 63600175 PHARM REV CODE 636 W HCPCS: Performed by: INTERNAL MEDICINE

## 2020-09-10 PROCEDURE — 99214 PR OFFICE/OUTPT VISIT, EST, LEVL IV, 30-39 MIN: ICD-10-PCS | Mod: ,,, | Performed by: INTERNAL MEDICINE

## 2020-09-10 PROCEDURE — 85379 FIBRIN DEGRADATION QUANT: CPT

## 2020-09-10 PROCEDURE — 27000190 HC CPAP FULL FACE MASK W/VALVE

## 2020-09-10 PROCEDURE — 96372 THER/PROPH/DIAG INJ SC/IM: CPT | Mod: 59 | Performed by: EMERGENCY MEDICINE

## 2020-09-10 PROCEDURE — G0378 HOSPITAL OBSERVATION PER HR: HCPCS

## 2020-09-10 RX ORDER — CHOLECALCIFEROL (VITAMIN D3) 25 MCG
1000 TABLET ORAL DAILY
Qty: 30 TABLET | Refills: 0 | Status: SHIPPED | OUTPATIENT
Start: 2020-09-11 | End: 2022-01-11

## 2020-09-10 RX ORDER — ASCORBIC ACID 500 MG
500 TABLET ORAL 2 TIMES DAILY
Qty: 30 TABLET | Refills: 0 | Status: SHIPPED | OUTPATIENT
Start: 2020-09-10 | End: 2022-01-11

## 2020-09-10 RX ADMIN — ENOXAPARIN SODIUM 40 MG: 40 INJECTION SUBCUTANEOUS at 08:09

## 2020-09-10 RX ADMIN — HYDROCODONE BITARTRATE AND ACETAMINOPHEN 1 TABLET: 10; 325 TABLET ORAL at 06:09

## 2020-09-10 RX ADMIN — PANTOPRAZOLE SODIUM 40 MG: 40 TABLET, DELAYED RELEASE ORAL at 08:09

## 2020-09-10 RX ADMIN — FLUOXETINE 40 MG: 20 CAPSULE ORAL at 08:09

## 2020-09-10 RX ADMIN — THERA TABS 1 TABLET: TAB at 08:09

## 2020-09-10 RX ADMIN — OXYCODONE HYDROCHLORIDE AND ACETAMINOPHEN 500 MG: 500 TABLET ORAL at 08:09

## 2020-09-10 RX ADMIN — Medication 1000 UNITS: at 08:09

## 2020-09-10 RX ADMIN — ALPRAZOLAM 2 MG: 1 TABLET ORAL at 01:09

## 2020-09-10 RX ADMIN — VALSARTAN 320 MG: 80 TABLET ORAL at 08:09

## 2020-09-10 RX ADMIN — FUROSEMIDE 40 MG: 40 TABLET ORAL at 08:09

## 2020-09-10 RX ADMIN — DEXAMETHASONE 6 MG: 4 TABLET ORAL at 08:09

## 2020-09-10 NOTE — CARE UPDATE
09/10/20 1631   Patient Assessment/Suction   Level of Consciousness (AVPU) alert   Home Oxygen Qualification   Room Air SpO2 At Rest 95 %   SpO2 During Exertion on O2 93 %   SpO2 on Recovery 94 %

## 2020-09-10 NOTE — PROGRESS NOTES
"[facility] Medicine  Progress Note    Patient Name: Maribel Hernandez  MRN: [id number]  Patient Class: OP- Observation   Admission Date: 9/7/2020  Length of Stay: 0 days  Attending Physician: Shiela Lynn MD  Primary Care Provider: Graciela Mercer NP        Subjective:     Principal Problem:COVID-19 virus infection        HPI:  Maribel Hernandez is a 39-year-old female with a past medical history of hypertension, COLIN, depression, anxiety, and morbid obesity who presents to the emergency room tonight with reports of shortness of breath.  Patient states on August 18, 2020 she tested COVID positive at the urgent care.  Patient states she has been in self quarantine ever since.  Patient states initially her symptoms included diarrhea, fever, and shortness of breath but have since subsided.  Patient reports she has been asymptomatic and afebrile for approximately 1 week.  Patient denies being prescribed antibiotic therapy during her initial phase of COVID.  Patient states this morning she woke up with shortness of breath accompanied by dyspnea on exertion.  Patient also endorses weakness and decreased energy.  Patient states It feels like someone was sitting on my chest."  Denies chest pain or chest pressure.  Patient states she was seen at the urgent care this morning in which a chest x-ray was obtained and was told she has pneumonia and instructed to come to the emergency room.  In the emergency room patient noted to be mildly hypoxic on room air and COVID positive.  Patient placed in observation on 09/07/2020 at approximately 7:30 p.m..  Patient reports that she lives at home with her daughter, denies the use of supplemental oxygen, reports compliance with nightly CPAP, denies use of ambulatory assist devices.  Full code status verified upon admission to the hospital.    Overview/Hospital Course:  No notes on file    Interval History:  Morbidly obese female, reporting " shortness of breath, presently requiring 2 liters/minute supplemental oxygen via nasal cannula.  No chest pain reported.  Patient uses CPAP at home for sleep apnea.  Patient denies any cough or expectoration.  Presently afebrile.    Review of Systems   Constitutional: Positive for activity change and fatigue. Negative for appetite change, chills and fever.   HENT: Negative for congestion, sinus pressure, sinus pain and sore throat.    Eyes: Negative for photophobia and visual disturbance.   Respiratory: Positive for shortness of breath. Negative for cough, choking, chest tightness and wheezing.    Cardiovascular: Negative for chest pain, palpitations and leg swelling.   Gastrointestinal: Positive for nausea. Negative for abdominal distention, abdominal pain, blood in stool, constipation, diarrhea and vomiting.   Genitourinary: Negative for difficulty urinating, dysuria, flank pain and hematuria.   Musculoskeletal: Positive for gait problem. Negative for back pain and joint swelling.        Chronic     Skin: Negative for rash and wound.   Neurological: Positive for weakness. Negative for dizziness, syncope, light-headedness, numbness and headaches.   Psychiatric/Behavioral: Negative for confusion. The patient is nervous/anxious.      Objective:     Vital Signs (Most Recent):  Temp: 96.7 °F (35.9 °C) (09/10/20 0748)  Pulse: (!) 59 (09/10/20 0748)  Resp: 18 (09/10/20 0748)  BP: (!) 158/94 (09/10/20 0748)  SpO2: 96 % (09/10/20 0748) Vital Signs (24h Range):  Temp:  [96.2 °F (35.7 °C)-97.7 °F (36.5 °C)] 96.7 °F (35.9 °C)  Pulse:  [53-73] 59  Resp:  [18-19] 18  SpO2:  [92 %-96 %] 96 %  BP: (133-158)/(82-94) 158/94     Weight: (!) 180.5 kg (397 lb 14.9 oz)  Body mass index is 62.32 kg/m².    Intake/Output Summary (Last 24 hours) at 9/10/2020 0944  Last data filed at 9/9/2020 2055  Gross per 24 hour   Intake 590 ml   Output --   Net 590 ml      Physical Exam  Vitals signs and nursing note reviewed.   Constitutional:        General: She is not in acute distress.     Appearance: She is well-developed. She is obese. She is not diaphoretic.   HENT:      Head: Normocephalic and atraumatic.   Eyes:      Pupils: Pupils are equal, round, and reactive to light.      Comments: Eyeglasses in use     Neck:      Musculoskeletal: Normal range of motion.      Vascular: No JVD.   Cardiovascular:      Rate and Rhythm: Normal rate and regular rhythm.   Pulmonary:      Effort: Pulmonary effort is normal. No respiratory distress.      Comments: Patient on room air during my initial interview and physical exam, patient in no acute respiratory distress.    Chest:      Chest wall: No tenderness.   Abdominal:      General: There is no distension.      Palpations: Abdomen is soft.      Tenderness: There is no abdominal tenderness. There is no guarding or rebound.   Genitourinary:     Comments: Exam Deferred     Musculoskeletal: Normal range of motion.         General: Swelling present. No tenderness or deformity.   Skin:     General: Skin is warm and dry.      Capillary Refill: Capillary refill takes 2 to 3 seconds.      Findings: No erythema or rash.   Neurological:      Mental Status: She is alert and oriented to person, place, and time.      Cranial Nerves: No cranial nerve deficit.      Sensory: No sensory deficit.   Psychiatric:         Behavior: Behavior normal.         Thought Content: Thought content normal.         Judgment: Judgment normal.         Significant Labs:   CBC:   Recent Labs   Lab 09/09/20  0649 09/10/20  0649   WBC 10.28 11.00   HGB 13.3 13.1   HCT 45.1 44.2   * 569*     CMP:   Recent Labs   Lab 09/09/20  0648 09/10/20  0649    140   K 4.5 4.0    102   CO2 27 28   * 128*   BUN 13 15   CREATININE 0.9 0.9   CALCIUM 9.2 9.7   PROT 8.2 8.0   ALBUMIN 3.3* 3.4*   BILITOT 0.7 0.7   ALKPHOS 105 125   AST 23 29   ALT 42 41   ANIONGAP 11 10   EGFRNONAA >60 >60     Microbiology Results (last 7 days)     Procedure  Component Value Units Date/Time    Blood culture (site 1) [[id number]] Collected: 09/08/20 0604    Order Status: Completed Specimen: Blood from Antecubital, Right Updated: 09/09/20 1612     Blood Culture, Routine No Growth to date      No Growth to date    Narrative:      Site # 1, aerobic and anaerobic    Blood culture (site 2) [[id number]] Collected: 09/08/20 0605    Order Status: Completed Specimen: Blood from Peripheral, Left Hand Updated: 09/09/20 1612     Blood Culture, Routine No Growth to date      No Growth to date    Narrative:      Site # 2, aerobic only        Significant Imaging:  CXR: Pulmonary edema or pneumonia in the bilateral lung bases, new since prior.    ECHO:   · Concentric left ventricular hypertrophy.  · Mild left ventricular enlargement.  · Normal left ventricular systolic function. The estimated ejection fraction is 55%.  · Normal LV diastolic function.  · Normal central venous pressure (3 mmHg).  · The estimated PA systolic pressure is 17 mmHg.  · Technically challenging study due to body habitus-morbid obesity        Assessment/Plan:      * COVID-19 virus infection  - COVID-19 testing   - Infection Control notified     - Isolation:   - Airborne, Contact and Droplet Precautions  - Cohort patients into COVID units  - N95 mask, wear eye protection  - 20 second hand hygiene              - Limit visitors per hospital policy              - Consolidating lab draws, nursing care, provider visits, and interventions    - Diagnostics: (leukopenia, hyponatremia, hyperferritinemia, elevated troponin, elevated d-dimer, age, and comorbidities are significant predictors of poor clinical outcome)  CBC, CMP, Procalcitonin, Ferritin, CRP, LDH, BNP, Troponin, ECG, Blood Culture x2, Portable CXR and UA and culture    - Management:  Supplemental O2 to maintain SpO2 >92%  Telemetry  Continuous/intermittent Pulse Ox  Careful use of steroids in the absence of other indications - unless septic shock due to increased  viral replication Fluid sparing resuscitation and Empiric antibiotics per likely source & patient allergies if patient hypoxic with airspace disease as 1 time doses CAP: azithromycin & ceftriaxone   Continue intravenous Rocephin and azithromycin.  Continue CPAP use.  Consult pulmonary medicine.  Remdesivir committee does not feel patient would benefit with Remdesivir now.  On dexamethasone 6 mg daily for 10 days.    Advance Care Planning   FULL CODE                Sinus pause  Tele monitoring.  Rule out acute coronary syndrome  Resuming Metoprolol 50 mg BID.  Appreciate Dr. Earl Dumont recommendations.Follow 2D echocardiogram.      Goals of care, counseling/discussion  Advance Care Planning     Living Will  During this visit, I engaged the patient  in the advance care planning process.  The patient and I reviewed the role for advance directives and their purpose in directing future healthcare if the patient's unable to speak for him/herself.  At this point in time, the patient does have full decision-making capacity.  We discussed different extreme health states that she could experience, and reviewed what kind of medical care she would want in those situations.  The patient communicated that if she were comatose and had little chance of a meaningful recovery, she would want machines/life-sustaining treatments used.I spent a total of 17 minutes engaging the patient in this advance care planning discussion.            Essential hypertension  Chronic, controlled.  Latest blood pressure and vitals reviewed-   Temp:  [96.4 °F (35.8 °C)-98.8 °F (37.1 °C)]   Pulse:  [64-86]   Resp:  [14-22]   BP: (123-169)/(65-98)   SpO2:  [92 %-96 %] .   Home meds for hypertension were reviewed and noted below. Hospital anti-hypertensive changes were made as shown below.  Hypertension Medications             cloNIDine 0.2 mg/24 hr td ptwk (CATAPRES) 0.2 mg/24 hr Place 1 patch onto the skin every 7 days.    furosemide (LASIX) 40 MG  tablet Take 40 mg by mouth 2 (two) times daily.    metoprolol tartrate (LOPRESSOR) 50 MG tablet Take 100 mg by mouth 2 (two) times daily.     valsartan (DIOVAN) 160 MG tablet Take 320 mg by mouth 2 (two) times daily.       Hospital Medications             cloNIDine 0.2 mg/24 hr td ptwk 1 patch Starting on 9/8/2020. 1 patch, Transdermal, Every 7 days    furosemide tablet 40 mg 40 mg, Oral, 2 times daily    metoprolol tartrate (LOPRESSOR) tablet 100 mg 100 mg, Oral, 2 times daily    valsartan tablet 320 mg 320 mg, Oral, 2 times daily        Will treat with PRN antihypertensives, as needed, to maintain BP less than 180/110 or if patient becomes symptomatic.          Moderate episode of recurrent major depressive disorder  Chronic, controlled. Will continue home Prozac.        Anxiety  Chronic, controlled. Will continue home Xanax as prescribed PRN.        Arthritis  Chronic, controlled. Will continue PRN Norco.  reviewed.        COLIN on CPAP  Patient reports compliance with nightly CPAP use, hold CPAP use due to COVID diagnosis.         Thrombocytosis  Noted, will trend with daily CBC.    Class 3 severe obesity due to excess calories with serious comorbidity and body mass index (BMI) of 60.0 to 69.9 in adult  Body mass index is 62.34 kg/m². Morbid obesity complicates all aspects of disease management from diagnostic modalities to treatment. Weight loss encouraged and health benefits explained to patient. Nutritional counseling and physical activity encouraged.           VTE Risk Mitigation (From admission, onward)         Ordered     enoxaparin injection 40 mg  Every 12 hours      09/08/20 1012     IP VTE HIGH RISK PATIENT  Once      09/07/20 2041     Place sequential compression device  Until discontinued      09/07/20 2041     Place AZAR hose  Until discontinued      09/07/20 2041                Discharge Planning   BUDDY: 9/11/2020     Code Status: Full Code   Is the patient medically ready for discharge?:     Reason  for patient still in hospital (select all that apply): Patient trending condition  Discharge Plan A: Home with family                  Shiela Lynn MD  Department of Hospital Medicine   [facility]

## 2020-09-10 NOTE — NURSING
Pt discharged, reviewed discharge instructions and medications with pt. Pt voiced understanding. All belongings gathered and taken with pt. IV removed, tele removed. Pt tolerated well. Pt dressed self. Pt ride present and hospital staff transported pt off of unit.

## 2020-09-10 NOTE — PLAN OF CARE
Plan of care reviewed with patient, verbalizes understanding. VSS. Cardiac monitor and continuous pulse ox in place. O2 @ 2L per nasal cannula. Using CPAP machine at night. Safety maintained, bed in lowest position, wheels locked, call light in reach.

## 2020-09-10 NOTE — CARE UPDATE
09/10/20 0702   Patient Assessment/Suction   Level of Consciousness (AVPU) alert   PRE-TX-O2   O2 Device (Oxygen Therapy) CPAP   $ Is the patient on Low Flow Oxygen? Yes   Flow (L/min) 2   Oxygen Concentration (%) 28   SpO2 95 %   Pulse Oximetry Type Continuous   $ Pulse Oximetry - Multiple Charge Pulse Oximetry - Multiple   Pulse 66   Resp 18   Wound Care   $ Wound Care Tech Time 15 min   Skin Color/Characteristics without discoloration   Preset CPAP/BiPAP Settings   Mode Of Delivery CPAP   $ CPAP/BiPAP Daily Charge BiPAP/CPAP Daily   $ Initial CPAP/BiPAP Setup? No   $ Is patient using? Yes   Size of Mask Medium/Large   Sized Appropriately? Yes   Equipment Type DeVilbiss   Airway Device Type medium full face mask   Humidifier not applicable   CPAP (cm H2O) 11   Patient CPAP/BiPAP Settings   Low (L/Min) 2   RR Total (Breaths/Min) 19   Tidal Volume (mL) 440

## 2020-09-10 NOTE — PT/OT/SLP EVAL
Physical Therapy Evaluation and Discharge Note    Patient Name:  Maribel Hernandez   MRN:  [id number]    Recommendations:     Discharge Recommendations:  home   Discharge Equipment Recommendations: none   Barriers to discharge: None    Assessment:     Maribel Hernandez is a 39 y.o. female admitted with a medical diagnosis of COVID-19 virus infection. .  At this time, patient is functioning at their prior level of function and does not require further acute PT services.     Recent Surgery: * No surgery found *      Plan:     During this hospitalization, patient does not require further acute PT services.  Please re-consult if situation changes.      Subjective     Chief Complaint: none given  Patient/Family Comments/goals: go home  Pain/Comfort:       Patients cultural, spiritual, Taoism conflicts given the current situation:      Living Environment:  Currently lives with family.  Prior to admission, patients level of function was independent.  Equipment used at home: CPAP.  DME owned (not currently used): none.  Upon discharge, patient will have assistance from family.    Objective:     Communicated with nurse prior to session.  Patient found supine with telemetry, peripheral IV upon PT entry to room.    General Precautions: Standard, fall   Orthopedic Precautions:N/A   Braces:       Exams:  · RLE ROM: WFL  · RLE Strength: 4+/5 overall  · LLE ROM: WFL  · LLE Strength: Deficits: 4+/5 overall    Functional Mobility:  · Bed Mobility:     · Supine to Sit: independence  · Sit to Supine: independence  · Transfers:     · Sit to Stand:  independence with no AD  · Gait: x 25 feet with no AD with independence    AM-PAC 6 CLICK MOBILITY  Total Score:24       Therapeutic Activities and Exercises:   none given    AM-PAC 6 CLICK MOBILITY  Total Score:24     Patient left supine with call button in reach and nurse notified.    GOALS:   Multidisciplinary Problems     Physical Therapy Goals     Not on file           Multidisciplinary Problems (Resolved)        Problem: Physical Therapy Goal    Goal Priority Disciplines Outcome Goal Variances Interventions   Physical Therapy Goal   (Resolved)     PT, PT/OT Met     Description: Goals to be met by: 2020    Patient will increase functional independence with mobility by performin. Bed to chair transfer with [place] using no AD                     History:     Past Medical History:   Diagnosis Date    Anxiety     Depression     Hypertension     Obesity        Past Surgical History:   Procedure Laterality Date    CHOLECYSTECTOMY      HYSTERECTOMY         Time Tracking:     PT Received On: 09/10/20  PT Start Time: 1406     PT Stop Time: 1426  PT Total Time (min): 20 min     Billable Minutes: Evaluation 20      Chris Megilligan, PT  09/10/2020

## 2020-09-10 NOTE — DISCHARGE INSTRUCTIONS
Instructions for Patients with Confirmed or Suspected COVID-19    If you are awaiting your test result, you will either be called or it will be released to the patient portal.  If you have any questions about your test, please visit [email or web address] or call our COVID-19 information line at [phone].      Instructions for non-hospitalized or discharged patients with confirmed or suspected COVID-19:       Stay home except to get medical care.    Separate yourself from other people and animals in your home.    Call ahead before visiting your doctor.    Wear a face mask.    Cover your coughs and sneezes.    Clean your hands often.    Avoid sharing personal household items.    Clean all high-touch surfaces every day.    Monitor your symptoms. Seek prompt medical attention if your illness is worsening (e.g., difficulty breathing). Before seeking care, call your healthcare provider.    If you have a medical emergency and must call 911, notify the dispatcher that you have or are being evaluated for COVID-19. If possible, put on a face mask before emergency medical services arrive.    Use the following symptom-based strategy to return to normal activity following a suspected or confirmed case of COVID-19. Continue isolation until:   o At least 3 days (72 hours) have passed since recovery defined as resolution of fever without the use of fever-reducing medications and improvement in respiratory symptoms (e.g. cough, shortness of breath), and   o At least 10 days have passed since the first positive test.       As one of the next steps, you will receive a call or text from the [place] Department of Health ([facility]) COVID-19 Tracing Team. See the contact information below so you know not to ignore the health departments call. It is important that you contact them back immediately so they can help.     Contact Tracer Number:  [phone]  Caller ID for most carriers: [place] [facility]    What is  contact tracing?   Contact tracing is a process that helps identify everyone who has been in close contact with an infected person. Contact tracers let those people know they may have been exposed and guide them on next steps. Confidentiality is important for everyone; no one will be told who may have exposed them to the virus.   Your involvement is important. The more we know about where and how this virus is spreading, the better chance we have at stopping it from spreading further.  What does exposure mean?   Exposure means you have been within 6 feet for more than 15 minutes with a person who has or had COVID-19.  What kind of questions do the contact tracers ask?   A contact tracer will confirm your basic contact information including name, address, phone number, and next of kin, as well as asking about any symptoms you may have had. Theyll also ask you how you think you may have gotten sick, such as places where you may have been exposed to the virus, and people you were with. Those names will never be shared with anyone outside of that call, and will only be used to help trace and stop the spread of the virus.   I have privacy concerns. How will the state use my information?   Your privacy about your health is important. All calls are completed using call centers that use the appropriate health privacy protection measures (HIPAA compliance), meaning that your patient information is safe. No one will ever ask you any questions related to immigration status. Your health comes first.   Do I have to participate?   You do not have to participate, but we strongly encourage you to. Contact tracing can help us catch and control new outbreaks as theyre developing to keep your friends and family safe.   What if I dont hear from anyone?   If you dont receive a call within 24 hours, you can call the number above right away to inquire about your status. That line is open from 8:00 am - 8:00 p.m., 7 days a  week.  Contact tracing saves lives! Together, we have the power to beat this virus and keep our loved ones and neighbors safe.       Instructions for household members, intimate partners and caregivers in a non-healthcare setting of a patient with confirmed or suspected COVID-19:         Close contacts should monitor their health and call their healthcare provider right away if they develop symptoms suggestive of COVID-19 (e.g., fever, cough, shortness of breath).    Stay home except to get medical care. Separate yourself from other people and animals in the home.   Monitor the patients symptoms. If the patient is getting sicker, call his or her healthcare provider. If the patient has a medical emergency and you need to call 911, notify the dispatch personnel that the patient has or is being evaluated for COVID-19.    Wear a facemask when around other people such as sharing a room or vehicle and before entering a healthcare provider's office.   Cover coughs and sneezes with a tissue. Throw used tissues in a lined trash can immediately and wash hands.   Clean hands often with soap and water for at least 20 seconds or with an alcohol-based hand , rubbing hands together until they feel dry. Avoid touching your eyes, nose, and mouth with unwashed hands.   Clean all high-touch; surfaces every day, including counters, tabletops, doorknobs, bathroom fixtures, toilets, phones, keyboards, tablets, bedside tables, etc. Use a household cleaning spray or wipe according to label instructions.   Avoid sharing personal household items such as dishes, drinking glasses, cups, towels, bedding, etc. After these items are used, they should be washed thoroughly with soap and water.   Continue isolation until:   At least 3 days (72 hours) have passed since recovery defined as resolution of fever without the use of fever-reducing medications and improvement in respiratory symptoms (e.g. cough, shortness of breath),  and    At least 10 days have passed since the patients first positive test.    https://www.cdc.gov/coronavirus/2019-ncov/your-health/index.htm  Continue CPAP use nightly and for sleep as before.  Continue incentive spirometry use as directed.  Keep herself active.  Keep blood pressure log.

## 2020-09-10 NOTE — DISCHARGE SUMMARY
"[facility] Medicine  Discharge Summary      Patient Name: Maribel Hernandez  MRN: [id number]  Admission Date: 9/7/2020  Hospital Length of Stay: 0 days  Discharge Date and Time:  09/10/2020 3:48 PM  Attending Physician: Shiela Lynn MD   Discharging Provider: Shiela Lynn MD  Primary Care Provider: Graciela Mercer NP      HPI:   Maribel Hernandez is a 39-year-old female with a past medical history of hypertension, COLIN, depression, anxiety, and morbid obesity who presents to the emergency room tonight with reports of shortness of breath.  Patient states on August 18, 2020 she tested COVID positive at the urgent care.  Patient states she has been in self quarantine ever since.  Patient states initially her symptoms included diarrhea, fever, and shortness of breath but have since subsided.  Patient reports she has been asymptomatic and afebrile for approximately 1 week.  Patient denies being prescribed antibiotic therapy during her initial phase of COVID.  Patient states this morning she woke up with shortness of breath accompanied by dyspnea on exertion.  Patient also endorses weakness and decreased energy.  Patient states It feels like someone was sitting on my chest."  Denies chest pain or chest pressure.  Patient states she was seen at the urgent care this morning in which a chest x-ray was obtained and was told she has pneumonia and instructed to come to the emergency room.  In the emergency room patient noted to be mildly hypoxic on room air and COVID positive.  Patient placed in observation on 09/07/2020 at approximately 7:30 p.m..  Patient reports that she lives at home with her daughter, denies the use of supplemental oxygen, reports compliance with nightly CPAP, denies use of ambulatory assist devices.  Full code status verified upon admission to the hospital.    Hospital Course:   Patient admitted to medicine telemetry service.  COVID -19 " respiratory/droplet/airborne precautions were observed.  Patient required supplemental oxygen.  During hospital stay patient was evaluated by Dr. Quintero from Pulmonary Medicine.  Patient did not qualify for intravenous Remdesivir use by RSD committee.  Over time patient's symptoms have improved.  Patient has been cleared for discharge by Pulmonary Medicine.  Patient to continue aggressive use of incentive spirometry and will continue use of CPAP nightly and for sleep as before.  Patient was encouraged to stay active.  Patient will continue close follow-up with Pulmonary Medicine along with primary care physician.    Consults:   Consults (From admission, onward)        Status Ordering Provider     Inpatient consult to Cardiology  Once     Provider:  Yovany Dumont MD    Acknowledged SULTAN, AQIB     Inpatient consult to Pulmonology  Once     Provider:  Rosa Monroy MD    Completed SULTAN, AQIB     Pharmacy Remdesivir Consult  Once     Provider:  (Not yet assigned)    Acknowledged SULTAN, AQIB        CXR: Pulmonary edema or pneumonia in the bilateral lung bases, new since prior.     ECHO:   · Concentric left ventricular hypertrophy.  · Mild left ventricular enlargement.  · Normal left ventricular systolic function. The estimated ejection fraction is 55%.  · Normal LV diastolic function.  · Normal central venous pressure (3 mmHg).  · The estimated PA systolic pressure is 17 mmHg.  · Technically challenging study due to body habitus-morbid obesity    Final Active Diagnoses:    Diagnosis Date Noted POA    PRINCIPAL PROBLEM:  COVID-19 virus infection [U07.1] 09/07/2020 Yes    Goals of care, counseling/discussion [Z71.89] 09/08/2020 Not Applicable    Sinus pause [I45.5] 09/08/2020 No    Class 3 severe obesity due to excess calories with serious comorbidity and body mass index (BMI) of 60.0 to 69.9 in adult [E66.01, Z68.44] 09/07/2020 Not Applicable    Thrombocytosis [D47.3] 09/07/2020 Yes    COLIN on CPAP  [G47.33, Z99.89] 09/07/2020 Not Applicable    Arthritis [M19.90] 09/07/2020 Yes    Anxiety [F41.9] 09/07/2020 Yes    Moderate episode of recurrent major depressive disorder [F33.1] 09/07/2020 Yes    Essential hypertension [I10] 09/07/2020 Yes      Problems Resolved During this Admission:       Discharged Condition: good    Disposition:     Follow Up:  Follow-up Information     Denisa Ac NP In 1 week.    Specialties: Pulmonary Disease, Critical Care Medicine  Contact information:  [address]  [address]  [place] [address]  [phone]             Graciela Mercer NP In 1 week.    Specialty: Family Medicine  Contact information:  [address] [facility] [address]  [phone]                 Patient Instructions:      Diet Cardiac     Other restrictions (specify):   Order Comments: PLEASE OBSERVE FALL PRECAUTIONS     Call MD for:   Order Comments: For worsening symptoms, chest pain, shortness of breath, increased abdominal pain, high grade fever, stroke or stroke like symptoms, immediately go to the nearest Emergency Room or call 911 as soon as possible.     COVID-19 Surveillance Program     Order Specific Question Answer Comments   Does patient have a smartphone? Yes    Does patient have the [facility] dinora on their smartphone? Yes    While in surveillance program, will patient be using home oxygen? Yes        Significant Diagnostic Studies: Labs:   CMP   Recent Labs   Lab 09/09/20  0648 09/10/20  0649    140   K 4.5 4.0    102   CO2 27 28   * 128*   BUN 13 15   CREATININE 0.9 0.9   CALCIUM 9.2 9.7   PROT 8.2 8.0   ALBUMIN 3.3* 3.4*   BILITOT 0.7 0.7   ALKPHOS 105 125   AST 23 29   ALT 42 41   ANIONGAP 11 10   ESTGFRAFRICA >60 >60   EGFRNONAA >60 >60    and CBC   Recent Labs   Lab 09/09/20  0649 09/10/20  0649   WBC 10.28 11.00   HGB 13.3 13.1   HCT 45.1 44.2   * 569*       Pending Diagnostic Studies:     None         Medications:  Reconciled  Home Medications:      Medication List      START taking these medications    ascorbic acid (vitamin C) 500 MG tablet  Commonly known as: VITAMIN C  Take 1 tablet (500 mg total) by mouth 2 (two) times daily.     multivitamin Tab  Take 1 tablet by mouth once daily.  Start taking on: September 11, 2020     pulse oximeter device  Commonly known as: pulse oximeter  by Apply Externally route 2 (two) times a day. Use twice daily at 8 AM and 3 PM and record the value in [employer] as directed.     vitamin D 1000 units Tab  Commonly known as: VITAMIN D3  Take 1 tablet (1,000 Units total) by mouth once daily.  Start taking on: September 11, 2020        CONTINUE taking these medications    ALPRAZolam 2 MG Tab  Commonly known as: XANAX  Take 2 mg by mouth 3 (three) times daily as needed (anxiety).     cloNIDine 0.2 mg/24 hr td ptwk 0.2 mg/24 hr  Commonly known as: CATAPRES  Place 1 patch onto the skin every 7 days.     FLUoxetine 40 MG capsule  Take 40 mg by mouth once daily.     furosemide 40 MG tablet  Commonly known as: LASIX  Take 40 mg by mouth 2 (two) times daily.     HYDROcodone-acetaminophen  mg per tablet  Commonly known as: NORCO  Take 1 tablet by mouth every 8 (eight) hours as needed for Pain.     metoprolol tartrate 50 MG tablet  Commonly known as: LOPRESSOR  Take 100 mg by mouth 2 (two) times daily.     valsartan 160 MG tablet  Commonly known as: DIOVAN  Take 320 mg by mouth 2 (two) times daily.        STOP taking these medications    diazePAM 5 MG tablet  Commonly known as: VALIUM          Indwelling Lines/Drains at time of discharge:   Lines/Drains/Airways     None             Time spent on the discharge of patient: 35 minutes  Patient was seen and examined on the date of discharge and determined to be suitable for discharge.         Shiela Lynn MD  Department of Hospital Medicine  [facility]

## 2020-09-10 NOTE — SUBJECTIVE & OBJECTIVE
Interval History:  Morbidly obese female, reporting shortness of breath, presently requiring 2 liters/minute supplemental oxygen via nasal cannula.  No chest pain reported.  Patient uses CPAP at home for sleep apnea.  Patient denies any cough or expectoration.  Presently afebrile.    Review of Systems   Constitutional: Positive for activity change and fatigue. Negative for appetite change, chills and fever.   HENT: Negative for congestion, sinus pressure, sinus pain and sore throat.    Eyes: Negative for photophobia and visual disturbance.   Respiratory: Positive for shortness of breath. Negative for cough, choking, chest tightness and wheezing.    Cardiovascular: Negative for chest pain, palpitations and leg swelling.   Gastrointestinal: Positive for nausea. Negative for abdominal distention, abdominal pain, blood in stool, constipation, diarrhea and vomiting.   Genitourinary: Negative for difficulty urinating, dysuria, flank pain and hematuria.   Musculoskeletal: Positive for gait problem. Negative for back pain and joint swelling.        Chronic     Skin: Negative for rash and wound.   Neurological: Positive for weakness. Negative for dizziness, syncope, light-headedness, numbness and headaches.   Psychiatric/Behavioral: Negative for confusion. The patient is nervous/anxious.      Objective:     Vital Signs (Most Recent):  Temp: 96.7 °F (35.9 °C) (09/10/20 0748)  Pulse: (!) 59 (09/10/20 0748)  Resp: 18 (09/10/20 0748)  BP: (!) 158/94 (09/10/20 0748)  SpO2: 96 % (09/10/20 0748) Vital Signs (24h Range):  Temp:  [96.2 °F (35.7 °C)-97.7 °F (36.5 °C)] 96.7 °F (35.9 °C)  Pulse:  [53-73] 59  Resp:  [18-19] 18  SpO2:  [92 %-96 %] 96 %  BP: (133-158)/(82-94) 158/94     Weight: (!) 180.5 kg (397 lb 14.9 oz)  Body mass index is 62.32 kg/m².    Intake/Output Summary (Last 24 hours) at 9/10/2020 0974  Last data filed at 9/9/2020 2055  Gross per 24 hour   Intake 590 ml   Output --   Net 590 ml      Physical Exam  Vitals signs  and nursing note reviewed.   Constitutional:       General: She is not in acute distress.     Appearance: She is well-developed. She is obese. She is not diaphoretic.   HENT:      Head: Normocephalic and atraumatic.   Eyes:      Pupils: Pupils are equal, round, and reactive to light.      Comments: Eyeglasses in use     Neck:      Musculoskeletal: Normal range of motion.      Vascular: No JVD.   Cardiovascular:      Rate and Rhythm: Normal rate and regular rhythm.   Pulmonary:      Effort: Pulmonary effort is normal. No respiratory distress.      Comments: Patient on room air during my initial interview and physical exam, patient in no acute respiratory distress.    Chest:      Chest wall: No tenderness.   Abdominal:      General: There is no distension.      Palpations: Abdomen is soft.      Tenderness: There is no abdominal tenderness. There is no guarding or rebound.   Genitourinary:     Comments: Exam Deferred     Musculoskeletal: Normal range of motion.         General: Swelling present. No tenderness or deformity.   Skin:     General: Skin is warm and dry.      Capillary Refill: Capillary refill takes 2 to 3 seconds.      Findings: No erythema or rash.   Neurological:      Mental Status: She is alert and oriented to person, place, and time.      Cranial Nerves: No cranial nerve deficit.      Sensory: No sensory deficit.   Psychiatric:         Behavior: Behavior normal.         Thought Content: Thought content normal.         Judgment: Judgment normal.         Significant Labs:   CBC:   Recent Labs   Lab 09/09/20  0649 09/10/20  0649   WBC 10.28 11.00   HGB 13.3 13.1   HCT 45.1 44.2   * 569*     CMP:   Recent Labs   Lab 09/09/20  0648 09/10/20  0649    140   K 4.5 4.0    102   CO2 27 28   * 128*   BUN 13 15   CREATININE 0.9 0.9   CALCIUM 9.2 9.7   PROT 8.2 8.0   ALBUMIN 3.3* 3.4*   BILITOT 0.7 0.7   ALKPHOS 105 125   AST 23 29   ALT 42 41   ANIONGAP 11 10   EGFRNONAA >60 >60      Microbiology Results (last 7 days)     Procedure Component Value Units Date/Time    Blood culture (site 1) [[id number]] Collected: 09/08/20 0604    Order Status: Completed Specimen: Blood from Antecubital, Right Updated: 09/09/20 1612     Blood Culture, Routine No Growth to date      No Growth to date    Narrative:      Site # 1, aerobic and anaerobic    Blood culture (site 2) [[id number]] Collected: 09/08/20 0605    Order Status: Completed Specimen: Blood from Peripheral, Left Hand Updated: 09/09/20 1612     Blood Culture, Routine No Growth to date      No Growth to date    Narrative:      Site # 2, aerobic only        Significant Imaging:  CXR: Pulmonary edema or pneumonia in the bilateral lung bases, new since prior.    ECHO:   · Concentric left ventricular hypertrophy.  · Mild left ventricular enlargement.  · Normal left ventricular systolic function. The estimated ejection fraction is 55%.  · Normal LV diastolic function.  · Normal central venous pressure (3 mmHg).  · The estimated PA systolic pressure is 17 mmHg.  · Technically challenging study due to body habitus-morbid obesity

## 2020-09-10 NOTE — PROGRESS NOTES
Progress Note  PULMONARY    Admit Date: 9/7/2020   09/10/2020      SUBJECTIVE:     9/9/20- no new complaints.  Saturating in 90s on 2 L nasal cannula  9/10- no new complaints.  Using CPAP    OBJECTIVE:     Vitals (Most recent):  Vitals:    09/10/20 0748   BP: (!) 158/94   Pulse: (!) 59   Resp: 18   Temp: 96.7 °F (35.9 °C)       Vitals (24 hour range):  Temp:  [96.2 °F (35.7 °C)-97.7 °F (36.5 °C)]   Pulse:  [53-73]   Resp:  [18-19]   BP: (133-158)/(82-94)   SpO2:  [92 %-96 %]       Intake/Output Summary (Last 24 hours) at 9/10/2020 1236  Last data filed at 9/9/2020 2055  Gross per 24 hour   Intake 590 ml   Output --   Net 590 ml          Physical Exam:  The patient's neuro status (alertness,orientation,cognitive function,motor skills,), pharyngeal exam (oral lesions, hygiene, abn dentition,), Neck (jvd,mass,thyroid,nodes in neck and above/below clavicle),RESPIRATORY(symmetry,effort,fremitus,percussion,auscultation),  Cor(rhythm,heart tones including gallops,perfusion,edema)ABD(distention,hepatic&splenomegaly,tenderness,masses), Skin(rash,cyanosis),Psyc(affect,judgement,).  Exam negative except for these pertinent findings:    Obese, awake  Breath sounds diminished due to body habitus  Abdomen distended  No edema    Radiographs reviewed: view by direct vision   CTA chest 9/8/20- scattered bilateral interstitial infiltrates; phase of contrast limits evaluation for PE- per final read no PE  CXR 09/07/2020- bilateral fluffy interstitial infiltrates with retrocardiac and left costophrenic opacification- possible consolidation versus atelectasis  CXR 8/18/20- mild bibasilar infiltrates otherwise clear    TTE 9/8/20-   · Concentric left ventricular hypertrophy.  · Mild left ventricular enlargement.  · Normal left ventricular systolic function. The estimated ejection fraction is 55%.  · Normal LV diastolic function.  · Normal central venous pressure (3 mmHg).  · The estimated PA systolic pressure is 17 mmHg.  · Technically  challenging study due to body habitus-morbid obesity    Labs     Recent Labs   Lab 09/10/20  0649   WBC 11.00   HGB 13.1   HCT 44.2   *     Recent Labs   Lab 09/10/20  0649      K 4.0      CO2 28   BUN 15   CREATININE 0.9   *   CALCIUM 9.7   MG 2.3   PHOS 3.3   AST 29   ALT 41   ALKPHOS 125   BILITOT 0.7   PROT 8.0   ALBUMIN 3.4*   No results for input(s): PH, PCO2, PO2, HCO3 in the last 24 hours.  Microbiology Results (last 7 days)     Procedure Component Value Units Date/Time    Blood culture (site 1) [[id number]] Collected: 09/08/20 0604    Order Status: Completed Specimen: Blood from Antecubital, Right Updated: 09/09/20 1612     Blood Culture, Routine No Growth to date      No Growth to date    Narrative:      Site # 1, aerobic and anaerobic    Blood culture (site 2) [[id number]] Collected: 09/08/20 0605    Order Status: Completed Specimen: Blood from Peripheral, Left Hand Updated: 09/09/20 1612     Blood Culture, Routine No Growth to date      No Growth to date    Narrative:      Site # 2, aerobic only          Impression:  Active Hospital Problems    Diagnosis  POA    *COVID-19 virus infection [U07.1]  Yes    Goals of care, counseling/discussion [Z71.89]  Not Applicable    Sinus pause [I45.5]  No    Class 3 severe obesity due to excess calories with serious comorbidity and body mass index (BMI) of 60.0 to 69.9 in adult [E66.01, Z68.44]  Not Applicable    Thrombocytosis [D47.3]  Yes    COLIN on CPAP [G47.33, Z99.89]  Not Applicable    Arthritis [M19.90]  Yes    Anxiety [F41.9]  Yes    Moderate episode of recurrent major depressive disorder [F33.1]  Yes    Essential hypertension [I10]  Yes      Resolved Hospital Problems   No resolved problems to display.               Plan:   COVID-19 pneumonia  Shortness of breath  COLIN on CPAP  Morbid obesity  HTN       - the findings on CT and symptoms currently are most likely sequelae of having COVID-19. She does not have active  signs/symptoms of ongoing or worsening viral infection so suspect worst of the infection is behind her. What she needs most now is mobilization and home O2 while she recovers. Likely her COLIN is exacerbated in setting of recent infection and noncompliance with home CPAP.   - she has had evaluation for PE which was negative and also TTE which shows heart function is good.  - discontinue decadron  - continue CPAP QHS and with naps  - BP control per hospitalist  - d/w hospitalist  - recommend dc home w/ home O2   - follow up in clinic with Denisa Ac or myself in 1-2 weeks  - follow up with cardiology and more urgent evaluation if she develops worsening palpitations/chest pain      Rosa Monroy MD  Pulmonary & Critical Care Medicine                                        .

## 2020-09-10 NOTE — PLAN OF CARE
Pt is  AAOX4. POC reviewed with pt. Pt verbalized understanding. VSS. Remains afebrile. Pt in neg pressure room d/t covid + and needing CPAP QHS.  Pain controlled with PRN medications. Remains free of injury. Bed low, breaks locked, call light in reach. Will monitor.

## 2020-09-10 NOTE — PLAN OF CARE
Pt is cleared from  for D/C.  Pt does not qualify for home oxygen.       09/10/20 1641   Final Note   Assessment Type Final Discharge Note   Anticipated Discharge Disposition Home   Hospital Follow Up  Appt(s) scheduled? Yes

## 2020-09-11 ENCOUNTER — NURSE TRIAGE (OUTPATIENT)
Dept: ADMINISTRATIVE | Facility: CLINIC | Age: 40
End: 2020-09-11

## 2020-09-11 ENCOUNTER — TELEPHONE (OUTPATIENT)
Dept: ADMINISTRATIVE | Facility: CLINIC | Age: 40
End: 2020-09-11

## 2020-09-11 NOTE — TELEPHONE ENCOUNTER
O2 93%, HR 79, T 97.0, fever/chills N, cough N, rates SOB at rest 0 / with activity 2    Called patient to review COVID-19 Surveillance Program enrollment process.    Smartphone: Yes     [facility]: Active     Program consent: pending    Pulse oximeter status: Yes, proficient     Verified emergency contacts: Yes updated    Program Overview: Reviewed , no response process, and importance of correct emergency contacts in event that well-being check is warranted. Encouraged patient to call [phone] 24/7 to speak with an OnCall nurse, if needed.    Patient had no further questions.    Reason for Disposition   [1] COVID-19 diagnosed by positive lab test AND [2] mild symptoms (e.g., cough, fever, others) AND [3] no complications or SOB    Additional Information   Negative: Severe difficulty breathing (e.g., struggling for each breath, speaks in single words)   Negative: Difficult to awaken or acting confused (e.g., disoriented, slurred speech)   Negative: Bluish (or gray) lips or face now   Negative: Shock suspected (e.g., cold/pale/clammy skin, too weak to stand, low BP, rapid pulse)   Negative: Sounds like a life-threatening emergency to the triager   Negative: [1] COVID-19 exposure AND [2] no symptoms   Negative: COVID-19 and Breastfeeding, questions about   Negative: [1] Adult with possible COVID-19 symptoms AND [2] triager concerned about severity of symptoms or other causes   Negative: SEVERE or constant chest pain or pressure (Exception: mild central chest pain, present only when coughing)   Negative: MODERATE difficulty breathing (e.g., speaks in phrases, SOB even at rest, pulse 100-120)   Negative: MILD difficulty breathing (e.g., minimal/no SOB at rest, SOB with walking, pulse <100)   Negative: Chest pain   Negative: Patient sounds very sick or weak to the triager   Negative: Fever > 103 F (39.4 C)   Negative: [1] Fever > 101 F (38.3 C) AND [2] age > 60   Negative: [1] Fever >  100.0 F (37.8 C) AND [2] bedridden (e.g., nursing home patient, CVA, chronic illness, recovering from surgery)   Negative: HIGH RISK patient (e.g., age > 64 years, diabetes, heart or lung disease, weak immune system) (Exception: has already been evaluated by healthcare provider and has no new or worsening symptoms)   Negative: [1] COVID-19 infection suspected by caller or triager AND [2] mild symptoms (cough, fever, or others) AND [3] no complications or SOB   Negative: Fever present > 3 days (72 hours)   Negative: [1] Fever returns after gone for over 24 hours AND [2] symptoms worse or not improved   Negative: [1] Continuous (nonstop) coughing interferes with work or school AND [2] no improvement using cough treatment per protocol   Negative: Cough present > 3 weeks    Protocols used: CORONAVIRUS (COVID-19) DIAGNOSED OR [id number]

## 2020-09-11 NOTE — TELEPHONE ENCOUNTER
Pt escalated due to symptoms os SOB with activity pt said that as long as she is laying down she doesn't have any problems but after she showered this am her sat's dropped to 90. O2 sat 96 at this time , Will do RN to ZEE escalation and pt will continue to monitor as well   Reason for Disposition   MILD difficulty breathing (e.g., minimal/no SOB at rest, SOB with walking, pulse <100)    Additional Information   Negative: Severe difficulty breathing (e.g., struggling for each breath, speaks in single words)   Negative: Difficult to awaken or acting confused (e.g., disoriented, slurred speech)   Negative: Bluish (or gray) lips or face now   Negative: Shock suspected (e.g., cold/pale/clammy skin, too weak to stand, low BP, rapid pulse)   Negative: Sounds like a life-threatening emergency to the triager   Negative: [1] COVID-19 exposure AND [2] no symptoms   Negative: COVID-19 and Breastfeeding, questions about   Negative: [1] Adult with possible COVID-19 symptoms AND [2] triager concerned about severity of symptoms or other causes   Negative: SEVERE or constant chest pain or pressure (Exception: mild central chest pain, present only when coughing)   Negative: MODERATE difficulty breathing (e.g., speaks in phrases, SOB even at rest, pulse 100-120)    Protocols used: CORONAVIRUS (COVID-19) DIAGNOSED OR [id number]

## 2020-09-11 NOTE — TELEPHONE ENCOUNTER
Covid-19 welcome call completed. Patient needed assistance submitting consent form. She will enter VS now.     Reason for Disposition   Information only question and nurse able to answer    Additional Information   Negative: CARDIAC ARREST suspected   Negative: Nursing judgment   Negative: Nursing judgment   Negative: Nursing judgment   Negative: Nursing judgment    Protocols used: INFORMATION ONLY CALL - NO TRIAGE-A-OH, 911 [id number]

## 2020-09-11 NOTE — TELEPHONE ENCOUNTER
"Called patient due to RN escalation in COVID Surveillance program. Pt escalated due to SOB with activity (3).    Patient location: [place]     Most recent vitals: Sp02: 95%. P: 66. Temp: 97.3 F    39 y.o. female with pertinent PMHx of COLIN on CPAP, obesity and HTN on >3 weeks of Covid symptoms. Positive Covid screen 9/7/20. CXR on 9/8/20 (prior to hospitalization): "Pulmonary edema or pneumonia in the bilateral lung bases, new since prior." Home oxygen: no. COVID-19 Hospitalization History: admitted 9/7-9/10, tx with dexamethasone, azithromycin and ceftriaxone, discharged to home without need for supplemental O2.    HPI: Patient was above PMH escalated today for reported shortness of breath with activity (3). She walked to the mailbox and had to sit down after. After taking a shower and getting dressed, SpO2 was 90%. She reports this is typical for her, but states the distance it takes to become short of breath is getting shorter. She denies cough, fever, chills, chest pain, or any other complaints at this time. She has PCP follow-up on 9/17/20 and Pulmonology follow-up on 9/25/20.    ROS: Denies worsening cough, light headedness, fever, chills, diaphoresis, chest pain, abdominal pain, emesis, diarrhea or further symptoms.     Assessment: Vitals appear WNL. During phone call, patient appears alert and oriented. Able to speak in full sentences without difficulty. No audible wheezing heard during call.    Plan:    Educated patient on how to take an ambulatory pulse ox. If SpO2 continues to drop to 90% or below with ambulation, she will present to the ED for further evaluation. Reviewed other strict ED precautions. If any new or worsening symptoms arise, go to ED or Urgent. Otherwise, patient will continue to submit data as scheduled. Reviewed importance of wearing mask if self or family members leave the house.     Advised next steps: Continue care at home  "

## 2020-09-12 ENCOUNTER — NURSE TRIAGE (OUTPATIENT)
Dept: ADMINISTRATIVE | Facility: CLINIC | Age: 40
End: 2020-09-12

## 2020-09-12 NOTE — TELEPHONE ENCOUNTER
Patient initially escalated due to no response, however patient entered vitals prior to phone call. Vital signs and symptoms did not trigger a second escalation; therefore, no follow up call is needed at this time.    Additional Information   Negative: Caller has already spoken with the PCP (or office), and has no further questions   Negative: Caller has already spoken with another triager and has no further questions   Negative: Caller has already spoken with another triager or PCP (or office), and has further questions and triager able to answer questions.   Negative: Busy signal.  First attempt to contact caller.  Follow-up call scheduled within 15 minutes.   Negative: No answer.  First attempt to contact caller.  Follow-up call scheduled within 15 minutes.   Negative: Message left on identified voicemail   Negative: Message left on unidentified voice mail. Phone number verified.   Negative: Message left with person in household   Negative: Wrong number reached. Phone number verified.   Negative: Second attempt to contact family AND no contact made. Phone number verified.   Negative: Cell phone out of range. Phone number verified.   Negative: Patient already left for the hospital/clinic   Negative: Caller has cancelled the call before the first contact   Negative: Unable to complete triage due to phone connection issues    Protocols used: NO CONTACT OR DUPLICATE CONTACT CALL-A-OH

## 2020-09-12 NOTE — TELEPHONE ENCOUNTER
Patient escalated due to abnormal vital sign and/or symptom; however, patient did not answer follow up nurse phone call. Per protocol, each number in charted was tried two times and voicemails were left instructing patient to call back or seek emergent care if needed.     Additional Information   Negative: Caller has already spoken with the PCP (or office), and has no further questions   Negative: Caller has already spoken with another triager and has no further questions   Negative: Caller has already spoken with another triager or PCP (or office), and has further questions and triager able to answer questions.   Negative: Busy signal.  First attempt to contact caller.  Follow-up call scheduled within 15 minutes.   Negative: No answer.  First attempt to contact caller.  Follow-up call scheduled within 15 minutes.   Negative: Message left on identified voicemail   Negative: Message left on unidentified voice mail. Phone number verified.   Negative: Message left with person in household   Negative: Wrong number reached. Phone number verified.   Negative: Second attempt to contact family AND no contact made. Phone number verified.   Negative: Cell phone out of range. Phone number verified.   Negative: Patient already left for the hospital/clinic   Negative: Caller has cancelled the call before the first contact   Negative: Unable to complete triage due to phone connection issues    Protocols used: NO CONTACT OR DUPLICATE CONTACT CALL-A-OH

## 2020-09-13 LAB
BACTERIA BLD CULT: NORMAL
BACTERIA BLD CULT: NORMAL

## 2020-09-14 ENCOUNTER — NURSE TRIAGE (OUTPATIENT)
Dept: ADMINISTRATIVE | Facility: CLINIC | Age: 40
End: 2020-09-14

## 2020-09-14 NOTE — TELEPHONE ENCOUNTER
Pt escalated for 'no response' to push message, but then entered vital statistics and answered Surveillance questions with no abnormalities noted and doesnt warrant outreach or triage at this time per Surveillance Program policy.

## 2020-09-15 ENCOUNTER — TELEPHONE (OUTPATIENT)
Dept: MEDSURG UNIT | Facility: HOSPITAL | Age: 40
End: 2020-09-15

## 2020-09-16 ENCOUNTER — PATIENT MESSAGE (OUTPATIENT)
Dept: ADMINISTRATIVE | Facility: OTHER | Age: 40
End: 2020-09-16

## 2020-09-16 ENCOUNTER — NURSE TRIAGE (OUTPATIENT)
Dept: ADMINISTRATIVE | Facility: CLINIC | Age: 40
End: 2020-09-16

## 2020-09-17 ENCOUNTER — NURSE TRIAGE (OUTPATIENT)
Dept: ADMINISTRATIVE | Facility: CLINIC | Age: 40
End: 2020-09-17

## 2020-09-17 ENCOUNTER — PATIENT MESSAGE (OUTPATIENT)
Dept: ADMINISTRATIVE | Facility: OTHER | Age: 40
End: 2020-09-17

## 2020-09-17 NOTE — TELEPHONE ENCOUNTER
Patient initially escalated due to no response, however patient entered vitals prior to phone call. Vital signs and symptoms did not trigger a second escalation; therefore, no follow up call is needed at this time.    Reason for Disposition   Caller has cancelled the call before the first contact    Protocols used: NO CONTACT OR DUPLICATE CONTACT CALL-A-OH

## 2020-09-18 ENCOUNTER — PATIENT MESSAGE (OUTPATIENT)
Dept: ADMINISTRATIVE | Facility: OTHER | Age: 40
End: 2020-09-18

## 2020-09-18 ENCOUNTER — NURSE TRIAGE (OUTPATIENT)
Dept: ADMINISTRATIVE | Facility: CLINIC | Age: 40
End: 2020-09-18

## 2020-09-19 ENCOUNTER — PATIENT MESSAGE (OUTPATIENT)
Dept: ADMINISTRATIVE | Facility: OTHER | Age: 40
End: 2020-09-19

## 2020-09-19 ENCOUNTER — NURSE TRIAGE (OUTPATIENT)
Dept: ADMINISTRATIVE | Facility: CLINIC | Age: 40
End: 2020-09-19

## 2020-09-19 NOTE — TELEPHONE ENCOUNTER
Patient initially escalated due to no response, however patient entered vitals prior to phone call. Vital signs and symptoms did not trigger a second escalation; therefore, no follow up call is needed at this time.      Reason for Disposition   Caller has cancelled the call before the first contact    Protocols used: NO CONTACT OR DUPLICATE CONTACT CALL-A-AH

## 2020-09-20 ENCOUNTER — PATIENT MESSAGE (OUTPATIENT)
Dept: ADMINISTRATIVE | Facility: OTHER | Age: 40
End: 2020-09-20

## 2020-09-20 ENCOUNTER — NURSE TRIAGE (OUTPATIENT)
Dept: ADMINISTRATIVE | Facility: CLINIC | Age: 40
End: 2020-09-20

## 2020-09-20 NOTE — TELEPHONE ENCOUNTER
Patient escalated due to no entry of vital sign and/or symptom; however, patient did not answer the follow up nurse phone call. Per protocol, each number in chart was tried one time, voicemail left instructing patient to call back. See follow up Number 100 message that was sent to patient.    Additional Information   Negative: Caller is angry or rude (e.g., hangs up, verbally abusive, yelling)   Negative: Caller hangs up   Negative: Caller has already spoken with the PCP and has no further questions.   Negative: Caller has already spoken with another triager and has no further questions.   Negative: Caller has already spoken with another triager or PCP AND has further questions AND triager able to answer questions.   Negative: Busy signal.  First attempt to contact caller.  Follow-up call scheduled within 15 minutes.   Negative: No answer.  First attempt to contact caller.  Follow-up call scheduled within 15 minutes.   Negative: Message left on identified voice mail   Negative: Message left on unidentified voice mail.  Phone number verified.   Negative: Message left with person in household.   Negative: Wrong number reached.  Phone number verified.   Negative: Second attempt to contact family AND no contact made.  Phone number verified.   Negative: Cell phone out of range.  Phone number verified.   Negative: Pager number given.  Answering service notified.   Negative: Patient already left for the hospital/clinic.   Negative: Caller has cancelled the call before the first contact   Negative: Unable to complete triage due to phone connection issues   Negative: NON-URGENT call redirected to PCP's office because it is open    Protocols used: NO CONTACT OR DUPLICATE CONTACT CALL-A-

## 2020-09-21 ENCOUNTER — PATIENT MESSAGE (OUTPATIENT)
Dept: ADMINISTRATIVE | Facility: OTHER | Age: 40
End: 2020-09-21

## 2020-09-22 ENCOUNTER — PATIENT MESSAGE (OUTPATIENT)
Dept: ADMINISTRATIVE | Facility: OTHER | Age: 40
End: 2020-09-22

## 2020-09-22 ENCOUNTER — NURSE TRIAGE (OUTPATIENT)
Dept: ADMINISTRATIVE | Facility: CLINIC | Age: 40
End: 2020-09-22

## 2020-09-23 ENCOUNTER — PATIENT MESSAGE (OUTPATIENT)
Dept: ADMINISTRATIVE | Facility: OTHER | Age: 40
End: 2020-09-23

## 2020-09-23 ENCOUNTER — NURSE TRIAGE (OUTPATIENT)
Dept: ADMINISTRATIVE | Facility: CLINIC | Age: 40
End: 2020-09-23

## 2020-09-24 ENCOUNTER — NURSE TRIAGE (OUTPATIENT)
Dept: ADMINISTRATIVE | Facility: CLINIC | Age: 40
End: 2020-09-24

## 2020-09-24 ENCOUNTER — PATIENT MESSAGE (OUTPATIENT)
Dept: ADMINISTRATIVE | Facility: OTHER | Age: 40
End: 2020-09-24

## 2020-09-24 NOTE — TELEPHONE ENCOUNTER
Covid Home Surveillance   Vitals O2  98 HR 88 Temp 98.4    Fever Symptomns: No  Cough: No  SOB at rest: 0  SOB with activity: 2    Pt escalated through the Covid Home Surveillance program d/t no response.  Pt entered her v/s late.  All VSS.  Pt denies fever, cough or any SOB at this time. No contact made with this pt.  Per protocol, no additional action needed at this time.  Will continue to monitor at next push notification.  Additional Information   Negative: Caller is angry or rude (e.g., hangs up, verbally abusive, yelling)   Negative: Caller hangs up   Negative: Caller has already spoken with the PCP and has no further questions.   Negative: Caller has already spoken with another triager and has no further questions.   Negative: Caller has already spoken with another triager or PCP AND has further questions AND triager able to answer questions.   Negative: Busy signal.  First attempt to contact caller.  Follow-up call scheduled within 15 minutes.   Negative: No answer.  First attempt to contact caller.  Follow-up call scheduled within 15 minutes.   Negative: Message left on identified voice mail   Negative: Message left on unidentified voice mail.  Phone number verified.   Negative: Message left with person in household.   Negative: Wrong number reached.  Phone number verified.   Negative: Second attempt to contact family AND no contact made.  Phone number verified.   Negative: Cell phone out of range.  Phone number verified.   Negative: Pager number given.  Answering service notified.   Negative: Patient already left for the hospital/clinic.   Negative: Caller has cancelled the call before the first contact   Negative: Unable to complete triage due to phone connection issues   Negative: NON-URGENT call redirected to PCP's office because it is open    Protocols used: NO CONTACT OR DUPLICATE CONTACT CALL-A-

## 2020-09-25 ENCOUNTER — OFFICE VISIT (OUTPATIENT)
Dept: PULMONOLOGY | Facility: CLINIC | Age: 40
End: 2020-09-25
Payer: MEDICAID

## 2020-09-25 ENCOUNTER — TELEPHONE (OUTPATIENT)
Dept: PULMONOLOGY | Facility: CLINIC | Age: 40
End: 2020-09-25

## 2020-09-25 VITALS
BODY MASS INDEX: 66.52 KG/M2 | OXYGEN SATURATION: 88 % | WEIGHT: 293 LBS | HEART RATE: 110 BPM | DIASTOLIC BLOOD PRESSURE: 114 MMHG | SYSTOLIC BLOOD PRESSURE: 179 MMHG

## 2020-09-25 DIAGNOSIS — U07.1 PNEUMONIA DUE TO COVID-19 VIRUS: Primary | ICD-10-CM

## 2020-09-25 DIAGNOSIS — J12.82 PNEUMONIA DUE TO COVID-19 VIRUS: Primary | ICD-10-CM

## 2020-09-25 DIAGNOSIS — R06.02 SHORTNESS OF BREATH: ICD-10-CM

## 2020-09-25 PROCEDURE — 99214 OFFICE O/P EST MOD 30 MIN: CPT | Mod: PBBFAC,PO | Performed by: NURSE PRACTITIONER

## 2020-09-25 PROCEDURE — 99213 OFFICE O/P EST LOW 20 MIN: CPT | Mod: S$PBB,,, | Performed by: NURSE PRACTITIONER

## 2020-09-25 PROCEDURE — 99999 PR PBB SHADOW E&M-EST. PATIENT-LVL IV: CPT | Mod: PBBFAC,,, | Performed by: NURSE PRACTITIONER

## 2020-09-25 PROCEDURE — 99213 PR OFFICE/OUTPT VISIT, EST, LEVL III, 20-29 MIN: ICD-10-PCS | Mod: S$PBB,,, | Performed by: NURSE PRACTITIONER

## 2020-09-25 PROCEDURE — 99999 PR PBB SHADOW E&M-EST. PATIENT-LVL IV: ICD-10-PCS | Mod: PBBFAC,,, | Performed by: NURSE PRACTITIONER

## 2020-09-25 RX ORDER — ENALAPRIL MALEATE 20 MG/1
20 TABLET ORAL
Status: ON HOLD | COMMUNITY
End: 2021-11-17 | Stop reason: HOSPADM

## 2020-09-25 RX ORDER — UBIDECARENONE 75 MG
500 CAPSULE ORAL
COMMUNITY
Start: 2019-10-16 | End: 2020-10-15

## 2020-09-25 RX ORDER — ALBUTEROL SULFATE 90 UG/1
2 AEROSOL, METERED RESPIRATORY (INHALATION) EVERY 4 HOURS PRN
Qty: 18 G | Refills: 0 | Status: ON HOLD | OUTPATIENT
Start: 2020-09-25 | End: 2021-11-17 | Stop reason: SDUPTHER

## 2020-09-25 RX ORDER — ALBUTEROL SULFATE 0.83 MG/ML
2.5 SOLUTION RESPIRATORY (INHALATION) EVERY 6 HOURS PRN
Qty: 120 ML | Refills: 0 | Status: SHIPPED | OUTPATIENT
Start: 2020-09-25 | End: 2021-09-25

## 2020-09-25 RX ORDER — DILTIAZEM HYDROCHLORIDE 120 MG/1
CAPSULE, EXTENDED RELEASE ORAL
Status: ON HOLD | COMMUNITY
End: 2021-11-17 | Stop reason: HOSPADM

## 2020-09-25 RX ORDER — GUAIFENESIN 600 MG/1
1200 TABLET, EXTENDED RELEASE ORAL 2 TIMES DAILY
Qty: 40 TABLET | Refills: 0 | Status: SHIPPED | OUTPATIENT
Start: 2020-09-25 | End: 2020-10-05

## 2020-09-25 NOTE — PROGRESS NOTES
9/25/2020    [facility] Follow Up: COVID 19 pneumonia    Chief Complaint   Patient presents with    Hospital Follow Up     COVID  +, still shortness of breath    Shortness of Breath     with activity       HPI:  9/25/2020- discharged 9/10/2020 from [facility] for COVID 19 pneumonia, states SOB is slowly improving  SOB- with exertion, associated with fatigue and generalized weakness, does not have nebulizer at home, improves with rest  Currently works as ,   Cough- resolved, no chest tightness or wheeze.     Dr. Monroy consult: History of Present Illness:  A 39-year-old female with hypertension, COLIN, depression, anxiety, and morbid obesity who presents to the ED with shortness of breath on 09/07.  She was found to have COVID-19 on 08/17 and initially symptomatic with diarrhea, fever, and shortness of breath but had improved.  Patient endorses chronic shortness of breath which has been going on for about 6 months and progressively worsening- has discussed with PCP and attributed to her obesity.  She uses CPAP off and on for the past 1 year but is not very compliant with it. She has been [facility] program and plans to have weight loss surgery in the future.  At baseline was able to ambulate to mailbox which would make her short of breath.  She denies prior lung disease or asthma.  Around 8/18 she reports severe episode of palpitations with lightheadedness while seated in a vehicle which caused her to call EMS and has been experiencing mild palpitations occasionally since then.  She was supposed to see a cardiologist but has not been able to follow up due to her recent self quarantine.  She became more short of breath than baseline in the past several days with limitation to walking in her house.  She walked to the restroom on the night of admission and experienced severe shortness of breath associated with chest pressure.  She  still has shortness of breath when ambulating in the room but denies fevers, chills, headache, diarrhea or any other symptoms.  Her appetite is improved.        The chief compliant  problem is new to me  PFSH:  Past Medical History:   Diagnosis Date    Anxiety     Depression     Hypertension     Obesity          Past Surgical History:   Procedure Laterality Date    CHOLECYSTECTOMY      HYSTERECTOMY       Social History     Tobacco Use    Smoking status: Never Smoker   Substance Use Topics    Alcohol use: No    Drug use: Not on file     Family History   Family history unknown: Yes     Review of patient's allergies indicates:  No Known Allergies  I have reviewed past medical, family, and social history. I have reviewed previous nurse notes.    Performance Status:The patient's activity level is functions out of house.          Review of Systems   Constitutional: Negative for activity change, appetite change, chills, diaphoresis, fatigue, fever and unexpected weight change.   HENT: Negative for dental problem, postnasal drip, rhinorrhea, sinus pressure, sinus pain, sneezing, sore throat, trouble swallowing and voice change.    Respiratory: Negative for apnea, cough, chest tightness, wheezing and stridor.  Positive for Shortness of breath  Cardiovascular: Negative for chest pain, palpitations and leg swelling.   Gastrointestinal: Negative for abdominal distention, abdominal pain, constipation and nausea.   Musculoskeletal: Negative for gait problem, myalgias and neck pain.   Skin: Negative for color change and pallor.   Allergic/Immunologic: Negative for environmental allergies and food allergies.   Neurological: Negative for dizziness, speech difficulty, weakness, light-headedness, numbness and headaches.   Hematological: Negative for adenopathy. Does not bruise/bleed easily.   Psychiatric/Behavioral: Negative for dysphoric mood and sleep disturbance. The patient is not nervous/anxious.            Exam:Comprehensive exam done. BP (!) 179/114 (BP Location: Left arm, Patient Position: Sitting)   Pulse 110   Wt (!) 192.7 kg (424 lb 11.5 oz)   LMP 11/21/2013   SpO2 (!) 88% Comment: on room air at rest  BMI 66.52 kg/m²   Exam included Vitals as listed  Constitutional: oriented to person, place, and time. appears well-developed. No distress.   Nose: Nose normal.   Mouth/Throat: Uvula is midline, oropharynx is clear and moist and mucous membranes are normal. No dental caries. No oropharyngeal exudate, posterior oropharyngeal edema, posterior oropharyngeal erythema or tonsillar abscesses.  Mallapatti (M) score 3  Eyes: Pupils are equal, round, and reactive to light.   Neck: No JVD present. No thyromegaly present.   Cardiovascular: Normal rate, regular rhythm and normal heart sounds. Exam reveals no gallop and no friction rub.   No murmur heard.  Pulmonary/Chest: Effort normal and breath sounds normal. No accessory muscle usage or stridor. No apnea and no tachypnea. No respiratory distress, decreased breath sounds, wheezes, rhonchi, rales, or tenderness.   Abdominal: Soft. Obese,  exhibits no mass. There is no tenderness.   Musculoskeletal: Normal range of motion. exhibits no edema.   Lymphadenopathy:   no cervical adenopathy.   Neurological:  alert and oriented to person, place, and time. not disoriented.   Skin: Skin is warm and dry. Capillary refill takes less 2 sec. No cyanosis or erythema. No pallor. Nails show no clubbing.   Psychiatric: normal mood and affect. behavior is normal. Judgment and thought content normal.       Radiographs (ct chest and cxr) reviewed: view by direct vision   Transthoracic echo (TTE) complete 9/8/20   The estimated PA systolic pressure is 17 mmHg     X-Ray Chest AP Portable  09/08/2020   Airspace disease bilateral mid and lower lung zones.  Unchanged heart size.  Indistinct pulmonary vasculature.  No pleural effusion or pneumothorax.     CTA Chest Non Coronary 09/08/2020   No  CTA evidence of pulmonary embolus.  Patchy bilateral lung infiltrates both central and peripheral in location with mild cardiomegaly.  This may represent atypical COVID-19 viral pneumonia or COVID-19  plus congestive heart failure.       Labs reviewed      Lab Results   Component Value Date    WBC 11.00 09/10/2020    RBC 4.59 09/10/2020    HGB 13.1 09/10/2020    HCT 44.2 09/10/2020    MCV 96 09/10/2020    MCH 28.5 09/10/2020    MCHC 29.6 (L) 09/10/2020    RDW 14.2 09/10/2020     (H) 09/10/2020    MPV 9.6 09/10/2020    GRAN 8.7 (H) 09/10/2020    GRAN 78.6 (H) 09/10/2020    LYMPH 1.5 09/10/2020    LYMPH 13.6 (L) 09/10/2020    MONO 0.7 09/10/2020    MONO 6.3 09/10/2020    EOS 0.1 09/10/2020    BASO 0.01 09/10/2020    EOSINOPHIL 0.5 09/10/2020    BASOPHIL 0.1 09/10/2020     SARS-CoV-2 RNA, Amplification, Qual 09/07/20   PositiveAbnormal     PFT was not done  Pulmonary Functions Testing Results:        Plan:  Clinical impression is apparently straight forward and impression with management as below.    Maribel was seen today for hospital follow up and shortness of breath.    Diagnoses and all orders for this visit:    Pneumonia due to COVID-19 virus  -     albuterol (VENTOLIN HFA) 90 mcg/actuation inhaler; Inhale 2 puffs into the lungs every 4 (four) hours as needed for Wheezing or Shortness of Breath. Rescue  -     X-Ray Chest PA And Lateral; Future  -     NEBULIZER FOR HOME USE  -     albuterol (PROVENTIL) 2.5 mg /3 mL (0.083 %) nebulizer solution; Take 3 mLs (2.5 mg total) by nebulization every 6 (six) hours as needed for Wheezing or Shortness of Breath. Rescue  -     guaiFENesin (MUCINEX) 600 mg 12 hr tablet; Take 2 tablets (1,200 mg total) by mouth 2 (two) times daily. for 10 days    Shortness of breath  -     albuterol (VENTOLIN HFA) 90 mcg/actuation inhaler; Inhale 2 puffs into the lungs every 4 (four) hours as needed for Wheezing or Shortness of Breath. Rescue  -     NEBULIZER FOR HOME USE        Follow  up if symptoms worsen or fail to improve.    Discussed with patient above for education the following:       Patient Instructions   Have chest x-ray in 2 weeks, when chest x-ray goes back to normal your pneumonia is resolved    Use nebulizer or rescue inhaler 1-2 times a day every day until breathing improves or every 4 hours as needed.    Take mucinex pills to moisten mucous and make it easier to cough up

## 2020-09-25 NOTE — PATIENT INSTRUCTIONS
Have chest x-ray in 2 weeks, when chest x-ray goes back to normal your pneumonia is resolved    Use nebulizer or rescue inhaler 1-2 times a day every day until breathing improves or every 4 hours as needed.    Take mucinex pills to moisten mucous and make it easier to cough up

## 2020-10-01 ENCOUNTER — HOSPITAL ENCOUNTER (EMERGENCY)
Facility: HOSPITAL | Age: 40
Discharge: HOME | End: 2020-10-01
Attending: EMERGENCY MEDICINE
Payer: MEDICAID

## 2020-10-01 VITALS
HEART RATE: 85 BPM | TEMPERATURE: 98 F | SYSTOLIC BLOOD PRESSURE: 175 MMHG | BODY MASS INDEX: 45.99 KG/M2 | OXYGEN SATURATION: 96 % | DIASTOLIC BLOOD PRESSURE: 90 MMHG | HEIGHT: 67 IN | WEIGHT: 293 LBS | RESPIRATION RATE: 16 BRPM

## 2020-10-01 DIAGNOSIS — M25.561 ACUTE PAIN OF RIGHT KNEE: Primary | ICD-10-CM
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
## 2020-10-01 PROCEDURE — 99283 EMERGENCY DEPT VISIT LOW MDM: CPT | Mod: 25

## 2020-10-01 NOTE — DISCHARGE INSTRUCTIONS
Alternate tylenol and ibuprofen for your pain.   You can walk on the right knee as tolerated. Keep your knee immobilized and use crutches to help you walk while your knee is unstable, until you can follow up with orthopedics.  Return to the emergency dept if you have worsening pain, swelling, fevers, or other concerns.

## 2020-10-01 NOTE — ED PROVIDER NOTES
Encounter Date: 10/1/2020       History     Chief Complaint   Patient presents with    Knee Pain     right knee pain     HPI       39-year-old female with past medical history of morbid obesity presenting with pain to the right knee.  The patient states that 3 days ago she was stepping out of a vehicle and twisted the knee.  She has been able to place weight on the knee, but it feels more unstable and she has pain in the posterior especially with weight bearing.  Today she was getting out of a bathtub when the knee gave out on her and she fell, striking the lateral sided knee on the ground.  Her pain is now increased.    Review of patient's allergies indicates:  No Known Allergies  Past Medical History:   Diagnosis Date    Anxiety     Depression     Hypertension     Obesity      Past Surgical History:   Procedure Laterality Date    CHOLECYSTECTOMY      HYSTERECTOMY       Family History   Family history unknown: Yes     Social History     Tobacco Use    Smoking status: Never Smoker   Substance Use Topics    Alcohol use: No    Drug use: Not on file     Review of Systems   Constitutional: Negative for fever.   HENT: Negative for congestion.    Respiratory: Negative for shortness of breath.    Cardiovascular: Negative for chest pain.   Gastrointestinal: Negative for vomiting.   Genitourinary: Negative for flank pain.   Musculoskeletal: Negative for back pain and neck pain.   Skin: Negative for wound.   Neurological: Negative for weakness and numbness.   Psychiatric/Behavioral: Negative for agitation and confusion.       Physical Exam     Initial Vitals [10/01/20 1454]   BP Pulse Resp Temp SpO2   (!) 175/90 85 16 98.4 °F (36.9 °C) 96 %      MAP       --         Physical Exam    Nursing note and vitals reviewed.  Constitutional:   Morbidly obese   HENT:   Head: Normocephalic and atraumatic.   Mouth/Throat: Oropharynx is clear and moist.   Eyes: Pupils are equal, round, and reactive to light.   Neck: Normal range  of motion. Neck supple.   Cardiovascular: Normal rate, normal heart sounds and intact distal pulses.   Pulmonary/Chest: Breath sounds normal. No respiratory distress. She has no wheezes.   Abdominal: Soft. She exhibits no distension. There is no abdominal tenderness.   Musculoskeletal: Normal range of motion. Tenderness (to the right knee, lateral  and posterior ) present. No edema.      Comments: Pain with ROM to right knee.    Neurological: She is alert and oriented to person, place, and time. She has normal strength.   No sensory deficit to foot    Skin: Skin is warm and dry.   No ecchymoses or other signs of injury   Psychiatric: She has a normal mood and affect. Thought content normal.         ED Course   Procedures  Labs Reviewed - No data to display       Imaging Results          X-Ray Knee Complete 4 Or More Views Right (Final result)  Result time 10/01/20 16:18:03    Final result by Ricki Molina MD (10/01/20 16:18:03)                 Narrative:    CLINICAL HISTORY:  39 years (1980) Female Fall getting out of bath today, rt knee  pain    TECHNIQUE:  XR KNEE COMP 4 OR MORE VIEWS RIGHT. 4 view(s) obtained.    COMPARISON:  None available.    FINDINGS:  No acute fracture or dislocation. Moderate medial and mild lateral and  patellofemoral compartment joint space narrowing with small  tricompartment osteophytes in keeping with osteoarthrosis. No knee  joint effusion is seen. Soft tissues appear radiographically within  normal limits and no radiopaque foreign body is seen.    IMPRESSION:  No acute fracture or dislocation.                  .    Electronically Signed by RICHARD Skinner on 10/1/2020 4:20 PM                               Medical Decision Making:   Initial Assessment:   In brief, 39 y.o. presents with knee pain, fall from standing   Vitals are significant for hypertension  Physical exam with no gross deformity, NVI  X rays with degenerative changes, no fracture  Will discharge with  knee immobilizer and crutches for instability.  Orthopedic follow-up.  Advised rest ice compression elevation, NSAIDs    Addie Harris MD  [employer] [facility] PGY-3  10/01/2020 6:38 PM                                  Clinical Impression:     ICD-10-CM ICD-9-CM   1. Acute pain of right knee  M25.561 719.46   2. Fall  W19.XXXA E888.9                          ED Disposition Condition    Discharge Stable        ED Prescriptions     None        Follow-up Information    None                                      Addie Harris MD  Resident  10/01/20 [id number]

## 2021-04-29 ENCOUNTER — PATIENT MESSAGE (OUTPATIENT)
Dept: RESEARCH | Facility: HOSPITAL | Age: 41
End: 2021-04-29

## 2021-09-27 DIAGNOSIS — C55 MALIGNANT NEOPLASM OF UTERUS, PART UNSPECIFIED: Primary | ICD-10-CM

## 2021-09-27 DIAGNOSIS — N93.9 ABNORMAL UTERINE BLEEDING: ICD-10-CM

## 2021-09-27 DIAGNOSIS — N93.9 ABNORMAL UTERINE AND VAGINAL BLEEDING, UNSPECIFIED: ICD-10-CM

## 2021-10-18 ENCOUNTER — HOSPITAL ENCOUNTER (OUTPATIENT)
Dept: RADIOLOGY | Facility: HOSPITAL | Age: 41
Discharge: HOME | End: 2021-10-18
Attending: EMERGENCY MEDICINE
Payer: MEDICAID

## 2021-10-18 DIAGNOSIS — N93.9 ABNORMAL UTERINE AND VAGINAL BLEEDING, UNSPECIFIED: ICD-10-CM

## 2021-10-18 DIAGNOSIS — C55 MALIGNANT NEOPLASM OF UTERUS, PART UNSPECIFIED: ICD-10-CM

## 2021-10-18 LAB
CREAT SERPL-MCNC: 1.1 MG/DL (ref 0.5–1.4)
SAMPLE: NORMAL

## 2021-10-18 PROCEDURE — 74177 CT ABD & PELVIS W/CONTRAST: CPT | Mod: TC,PO

## 2021-10-18 PROCEDURE — 25500020 PHARM REV CODE 255: Mod: PO | Performed by: EMERGENCY MEDICINE

## 2021-10-18 RX ADMIN — IOHEXOL 100 ML: 350 INJECTION, SOLUTION INTRAVENOUS at 02:10

## 2021-11-13 ENCOUNTER — HOSPITAL ENCOUNTER (OUTPATIENT)
Facility: HOSPITAL | Age: 41
Discharge: HOME | End: 2021-11-17
Attending: EMERGENCY MEDICINE | Admitting: INTERNAL MEDICINE
Payer: MEDICAID

## 2021-11-13 DIAGNOSIS — J40 BRONCHITIS: ICD-10-CM

## 2021-11-13 DIAGNOSIS — E66.01 CLASS 3 SEVERE OBESITY DUE TO EXCESS CALORIES WITH SERIOUS COMORBIDITY AND BODY MASS INDEX (BMI) OF 60.0 TO 69.9 IN ADULT: ICD-10-CM

## 2021-11-13 DIAGNOSIS — R07.9 CHEST PAIN, UNSPECIFIED TYPE: ICD-10-CM

## 2021-11-13 DIAGNOSIS — I10 ESSENTIAL HYPERTENSION: ICD-10-CM

## 2021-11-13 DIAGNOSIS — R07.9 CHEST PAIN: Primary | ICD-10-CM

## 2021-11-13 DIAGNOSIS — I20.89 STABLE ANGINA PECTORIS: ICD-10-CM

## 2021-11-13 DIAGNOSIS — R06.02 SHORTNESS OF BREATH: ICD-10-CM

## 2021-11-13 DIAGNOSIS — U07.1 PNEUMONIA DUE TO COVID-19 VIRUS: ICD-10-CM

## 2021-11-13 DIAGNOSIS — G47.33 OSA ON CPAP: ICD-10-CM

## 2021-11-13 DIAGNOSIS — J12.82 PNEUMONIA DUE TO COVID-19 VIRUS: ICD-10-CM

## 2021-11-13 DIAGNOSIS — R94.39 POSITIVE CARDIAC STRESS TEST: ICD-10-CM

## 2021-11-13 LAB
ALBUMIN SERPL BCP-MCNC: 3.8 G/DL (ref 3.5–5.2)
ALP SERPL-CCNC: 116 U/L (ref 55–135)
ALT SERPL W/O P-5'-P-CCNC: 47 U/L (ref 10–44)
AMPHET+METHAMPHET UR QL: NEGATIVE
ANION GAP SERPL CALC-SCNC: 8 MMOL/L (ref 8–16)
APTT PPP: 32.1 SEC (ref 23.3–35.1)
AST SERPL-CCNC: 40 U/L (ref 10–40)
BARBITURATES UR QL SCN>200 NG/ML: NEGATIVE
BASOPHILS # BLD AUTO: 0.07 K/UL (ref 0–0.2)
BASOPHILS NFR BLD: 0.7 % (ref 0–1.9)
BENZODIAZ UR QL SCN>200 NG/ML: NEGATIVE
BILIRUB SERPL-MCNC: 1.7 MG/DL (ref 0.1–1)
BNP SERPL-MCNC: 23 PG/ML (ref 0–99)
BUN SERPL-MCNC: 14 MG/DL (ref 6–20)
BZE UR QL SCN: NEGATIVE
CALCIUM SERPL-MCNC: 8.9 MG/DL (ref 8.7–10.5)
CANNABINOIDS UR QL SCN: NEGATIVE
CHLORIDE SERPL-SCNC: 99 MMOL/L (ref 95–110)
CO2 SERPL-SCNC: 28 MMOL/L (ref 23–29)
CREAT SERPL-MCNC: 1.1 MG/DL (ref 0.5–1.4)
CREAT UR-MCNC: 139 MG/DL (ref 15–325)
DIFFERENTIAL METHOD: ABNORMAL
EOSINOPHIL # BLD AUTO: 0.2 K/UL (ref 0–0.5)
EOSINOPHIL NFR BLD: 2.2 % (ref 0–8)
ERYTHROCYTE [DISTWIDTH] IN BLOOD BY AUTOMATED COUNT: 13.9 % (ref 11.5–14.5)
EST. GFR  (AFRICAN AMERICAN): >60 ML/MIN/1.73 M^2
EST. GFR  (NON AFRICAN AMERICAN): >60 ML/MIN/1.73 M^2
GLUCOSE SERPL-MCNC: 164 MG/DL (ref 70–110)
HCT VFR BLD AUTO: 50 % (ref 37–48.5)
HGB BLD-MCNC: 15.6 G/DL (ref 12–16)
IMM GRANULOCYTES # BLD AUTO: 0.04 K/UL (ref 0–0.04)
IMM GRANULOCYTES NFR BLD AUTO: 0.4 % (ref 0–0.5)
INR PPP: 1.1
LYMPHOCYTES # BLD AUTO: 1.7 K/UL (ref 1–4.8)
LYMPHOCYTES NFR BLD: 16.5 % (ref 18–48)
MAGNESIUM SERPL-MCNC: 2 MG/DL (ref 1.6–2.6)
MCH RBC QN AUTO: 28.1 PG (ref 27–31)
MCHC RBC AUTO-ENTMCNC: 31.2 G/DL (ref 32–36)
MCV RBC AUTO: 90 FL (ref 82–98)
MONOCYTES # BLD AUTO: 0.4 K/UL (ref 0.3–1)
MONOCYTES NFR BLD: 4 % (ref 4–15)
NEUTROPHILS # BLD AUTO: 7.9 K/UL (ref 1.8–7.7)
NEUTROPHILS NFR BLD: 76.2 % (ref 38–73)
NRBC BLD-RTO: 0 /100 WBC
OPIATES UR QL SCN: NEGATIVE
PCP UR QL SCN>25 NG/ML: NEGATIVE
PLATELET # BLD AUTO: 411 K/UL (ref 150–450)
PMV BLD AUTO: 10.2 FL (ref 9.2–12.9)
POTASSIUM SERPL-SCNC: 3.8 MMOL/L (ref 3.5–5.1)
PROT SERPL-MCNC: 8.5 G/DL (ref 6–8.4)
PROTHROMBIN TIME: 13.1 SEC (ref 11.4–13.7)
RBC # BLD AUTO: 5.56 M/UL (ref 4–5.4)
SARS-COV-2 RDRP RESP QL NAA+PROBE: NEGATIVE
SODIUM SERPL-SCNC: 135 MMOL/L (ref 136–145)
TOXICOLOGY INFORMATION: NORMAL
TROPONIN I SERPL DL<=0.01 NG/ML-MCNC: 0.03 NG/ML
TROPONIN I SERPL DL<=0.01 NG/ML-MCNC: 0.04 NG/ML
TROPONIN I SERPL DL<=0.01 NG/ML-MCNC: <0.03 NG/ML
TSH SERPL DL<=0.005 MIU/L-ACNC: 2.11 UIU/ML (ref 0.34–5.6)
WBC # BLD AUTO: 10.42 K/UL (ref 3.9–12.7)

## 2021-11-13 PROCEDURE — 80053 COMPREHEN METABOLIC PANEL: CPT | Performed by: EMERGENCY MEDICINE

## 2021-11-13 PROCEDURE — G0378 HOSPITAL OBSERVATION PER HR: HCPCS

## 2021-11-13 PROCEDURE — 93010 EKG 12-LEAD: ICD-10-PCS | Mod: ,,, | Performed by: INTERNAL MEDICINE

## 2021-11-13 PROCEDURE — 85730 THROMBOPLASTIN TIME PARTIAL: CPT | Performed by: EMERGENCY MEDICINE

## 2021-11-13 PROCEDURE — 94799 UNLISTED PULMONARY SVC/PX: CPT

## 2021-11-13 PROCEDURE — 85610 PROTHROMBIN TIME: CPT | Performed by: EMERGENCY MEDICINE

## 2021-11-13 PROCEDURE — 99285 EMERGENCY DEPT VISIT HI MDM: CPT | Mod: 25

## 2021-11-13 PROCEDURE — 84443 ASSAY THYROID STIM HORMONE: CPT | Performed by: EMERGENCY MEDICINE

## 2021-11-13 PROCEDURE — 27000221 HC OXYGEN, UP TO 24 HOURS

## 2021-11-13 PROCEDURE — 99900035 HC TECH TIME PER 15 MIN (STAT)

## 2021-11-13 PROCEDURE — 93005 ELECTROCARDIOGRAM TRACING: CPT | Performed by: INTERNAL MEDICINE

## 2021-11-13 PROCEDURE — C9113 INJ PANTOPRAZOLE SODIUM, VIA: HCPCS | Performed by: INTERNAL MEDICINE

## 2021-11-13 PROCEDURE — 63600175 PHARM REV CODE 636 W HCPCS: Performed by: NURSE PRACTITIONER

## 2021-11-13 PROCEDURE — 25000242 PHARM REV CODE 250 ALT 637 W/ HCPCS: Performed by: EMERGENCY MEDICINE

## 2021-11-13 PROCEDURE — U0002 COVID-19 LAB TEST NON-CDC: HCPCS | Performed by: NURSE PRACTITIONER

## 2021-11-13 PROCEDURE — 25000003 PHARM REV CODE 250: Performed by: NURSE PRACTITIONER

## 2021-11-13 PROCEDURE — 36415 COLL VENOUS BLD VENIPUNCTURE: CPT | Performed by: INTERNAL MEDICINE

## 2021-11-13 PROCEDURE — 84484 ASSAY OF TROPONIN QUANT: CPT | Mod: 91 | Performed by: INTERNAL MEDICINE

## 2021-11-13 PROCEDURE — 94640 AIRWAY INHALATION TREATMENT: CPT

## 2021-11-13 PROCEDURE — 99900031 HC PATIENT EDUCATION (STAT)

## 2021-11-13 PROCEDURE — 63600175 PHARM REV CODE 636 W HCPCS: Performed by: INTERNAL MEDICINE

## 2021-11-13 PROCEDURE — 80307 DRUG TEST PRSMV CHEM ANLYZR: CPT | Performed by: EMERGENCY MEDICINE

## 2021-11-13 PROCEDURE — 83880 ASSAY OF NATRIURETIC PEPTIDE: CPT | Performed by: EMERGENCY MEDICINE

## 2021-11-13 PROCEDURE — 96374 THER/PROPH/DIAG INJ IV PUSH: CPT

## 2021-11-13 PROCEDURE — 85025 COMPLETE CBC W/AUTO DIFF WBC: CPT | Performed by: EMERGENCY MEDICINE

## 2021-11-13 PROCEDURE — 94761 N-INVAS EAR/PLS OXIMETRY MLT: CPT

## 2021-11-13 PROCEDURE — 84484 ASSAY OF TROPONIN QUANT: CPT | Mod: 91 | Performed by: NURSE PRACTITIONER

## 2021-11-13 PROCEDURE — 96372 THER/PROPH/DIAG INJ SC/IM: CPT

## 2021-11-13 PROCEDURE — 83735 ASSAY OF MAGNESIUM: CPT | Performed by: EMERGENCY MEDICINE

## 2021-11-13 PROCEDURE — 84484 ASSAY OF TROPONIN QUANT: CPT | Performed by: EMERGENCY MEDICINE

## 2021-11-13 PROCEDURE — 93010 ELECTROCARDIOGRAM REPORT: CPT | Mod: ,,, | Performed by: INTERNAL MEDICINE

## 2021-11-13 PROCEDURE — 25000003 PHARM REV CODE 250: Performed by: INTERNAL MEDICINE

## 2021-11-13 RX ORDER — ALPRAZOLAM 0.5 MG/1
1 TABLET ORAL 3 TIMES DAILY PRN
Status: DISCONTINUED | OUTPATIENT
Start: 2021-11-13 | End: 2021-11-17 | Stop reason: HOSPADM

## 2021-11-13 RX ORDER — SODIUM CHLORIDE 0.9 % (FLUSH) 0.9 %
10 SYRINGE (ML) INJECTION
Status: DISCONTINUED | OUTPATIENT
Start: 2021-11-13 | End: 2021-11-17 | Stop reason: HOSPADM

## 2021-11-13 RX ORDER — VALSARTAN 80 MG/1
160 TABLET ORAL 2 TIMES DAILY
Status: DISCONTINUED | OUTPATIENT
Start: 2021-11-13 | End: 2021-11-13

## 2021-11-13 RX ORDER — ONDANSETRON 2 MG/ML
4 INJECTION INTRAMUSCULAR; INTRAVENOUS EVERY 8 HOURS PRN
Status: DISCONTINUED | OUTPATIENT
Start: 2021-11-13 | End: 2021-11-17 | Stop reason: HOSPADM

## 2021-11-13 RX ORDER — METOPROLOL TARTRATE 50 MG/1
100 TABLET ORAL 2 TIMES DAILY
Status: DISCONTINUED | OUTPATIENT
Start: 2021-11-14 | End: 2021-11-17 | Stop reason: HOSPADM

## 2021-11-13 RX ORDER — NAPROXEN SODIUM 220 MG/1
81 TABLET, FILM COATED ORAL DAILY
Status: DISCONTINUED | OUTPATIENT
Start: 2021-11-14 | End: 2021-11-17 | Stop reason: HOSPADM

## 2021-11-13 RX ORDER — PANTOPRAZOLE SODIUM 40 MG/10ML
40 INJECTION, POWDER, LYOPHILIZED, FOR SOLUTION INTRAVENOUS DAILY
Status: DISCONTINUED | OUTPATIENT
Start: 2021-11-13 | End: 2021-11-16

## 2021-11-13 RX ORDER — IPRATROPIUM BROMIDE AND ALBUTEROL SULFATE 2.5; .5 MG/3ML; MG/3ML
3 SOLUTION RESPIRATORY (INHALATION)
Status: COMPLETED | OUTPATIENT
Start: 2021-11-13 | End: 2021-11-13

## 2021-11-13 RX ORDER — ATORVASTATIN CALCIUM 20 MG/1
80 TABLET, FILM COATED ORAL DAILY
Status: DISCONTINUED | OUTPATIENT
Start: 2021-11-13 | End: 2021-11-13

## 2021-11-13 RX ORDER — NITROGLYCERIN 80 MG/1
1 PATCH TRANSDERMAL DAILY
Status: DISCONTINUED | OUTPATIENT
Start: 2021-11-13 | End: 2021-11-17

## 2021-11-13 RX ORDER — POTASSIUM CHLORIDE 20 MEQ/1
40 TABLET, EXTENDED RELEASE ORAL
Status: DISCONTINUED | OUTPATIENT
Start: 2021-11-13 | End: 2021-11-17 | Stop reason: HOSPADM

## 2021-11-13 RX ORDER — LANOLIN ALCOHOL/MO/W.PET/CERES
800 CREAM (GRAM) TOPICAL
Status: DISCONTINUED | OUTPATIENT
Start: 2021-11-13 | End: 2021-11-17 | Stop reason: HOSPADM

## 2021-11-13 RX ORDER — LISINOPRIL 20 MG/1
20 TABLET ORAL DAILY
Status: DISCONTINUED | OUTPATIENT
Start: 2021-11-14 | End: 2021-11-16

## 2021-11-13 RX ORDER — ENOXAPARIN SODIUM 100 MG/ML
40 INJECTION SUBCUTANEOUS EVERY 12 HOURS
Status: DISCONTINUED | OUTPATIENT
Start: 2021-11-13 | End: 2021-11-17 | Stop reason: HOSPADM

## 2021-11-13 RX ORDER — MAGNESIUM SULFATE HEPTAHYDRATE 40 MG/ML
2 INJECTION, SOLUTION INTRAVENOUS
Status: DISCONTINUED | OUTPATIENT
Start: 2021-11-13 | End: 2021-11-17 | Stop reason: HOSPADM

## 2021-11-13 RX ORDER — CAPTOPRIL 12.5 MG/1
12.5 TABLET ORAL 2 TIMES DAILY
Status: DISCONTINUED | OUTPATIENT
Start: 2021-11-13 | End: 2021-11-13

## 2021-11-13 RX ORDER — POTASSIUM CHLORIDE 20 MEQ/1
20 TABLET, EXTENDED RELEASE ORAL
Status: DISCONTINUED | OUTPATIENT
Start: 2021-11-13 | End: 2021-11-17 | Stop reason: HOSPADM

## 2021-11-13 RX ORDER — POTASSIUM CHLORIDE 7.45 MG/ML
40 INJECTION INTRAVENOUS
Status: DISCONTINUED | OUTPATIENT
Start: 2021-11-13 | End: 2021-11-17 | Stop reason: HOSPADM

## 2021-11-13 RX ORDER — ALBUTEROL SULFATE 90 UG/1
2 AEROSOL, METERED RESPIRATORY (INHALATION) EVERY 4 HOURS PRN
Status: DISCONTINUED | OUTPATIENT
Start: 2021-11-13 | End: 2021-11-14

## 2021-11-13 RX ORDER — ASPIRIN 325 MG
325 TABLET ORAL DAILY
Status: DISCONTINUED | OUTPATIENT
Start: 2021-11-13 | End: 2021-11-13

## 2021-11-13 RX ORDER — POTASSIUM CHLORIDE 7.45 MG/ML
20 INJECTION INTRAVENOUS
Status: DISCONTINUED | OUTPATIENT
Start: 2021-11-13 | End: 2021-11-17 | Stop reason: HOSPADM

## 2021-11-13 RX ORDER — ASCORBIC ACID 500 MG
500 TABLET ORAL 2 TIMES DAILY
Status: DISCONTINUED | OUTPATIENT
Start: 2021-11-13 | End: 2021-11-17 | Stop reason: HOSPADM

## 2021-11-13 RX ORDER — FUROSEMIDE 40 MG/1
40 TABLET ORAL 2 TIMES DAILY
Status: DISCONTINUED | OUTPATIENT
Start: 2021-11-13 | End: 2021-11-17

## 2021-11-13 RX ORDER — FLUOXETINE HYDROCHLORIDE 20 MG/1
40 CAPSULE ORAL DAILY
Status: DISCONTINUED | OUTPATIENT
Start: 2021-11-13 | End: 2021-11-17 | Stop reason: HOSPADM

## 2021-11-13 RX ORDER — METOPROLOL TARTRATE 50 MG/1
100 TABLET ORAL ONCE
Status: COMPLETED | OUTPATIENT
Start: 2021-11-13 | End: 2021-11-13

## 2021-11-13 RX ORDER — CHOLECALCIFEROL (VITAMIN D3) 25 MCG
1000 TABLET ORAL DAILY
Status: DISCONTINUED | OUTPATIENT
Start: 2021-11-13 | End: 2021-11-17 | Stop reason: HOSPADM

## 2021-11-13 RX ORDER — MAGNESIUM SULFATE HEPTAHYDRATE 40 MG/ML
4 INJECTION, SOLUTION INTRAVENOUS
Status: DISCONTINUED | OUTPATIENT
Start: 2021-11-13 | End: 2021-11-17 | Stop reason: HOSPADM

## 2021-11-13 RX ORDER — CLONIDINE 0.2 MG/24H
1 PATCH, EXTENDED RELEASE TRANSDERMAL
Status: DISCONTINUED | OUTPATIENT
Start: 2021-11-13 | End: 2021-11-17 | Stop reason: HOSPADM

## 2021-11-13 RX ORDER — ATORVASTATIN CALCIUM 40 MG/1
80 TABLET, FILM COATED ORAL NIGHTLY
Status: DISCONTINUED | OUTPATIENT
Start: 2021-11-14 | End: 2021-11-17 | Stop reason: HOSPADM

## 2021-11-13 RX ORDER — ACETAMINOPHEN 325 MG/1
650 TABLET ORAL EVERY 6 HOURS PRN
Status: DISCONTINUED | OUTPATIENT
Start: 2021-11-13 | End: 2021-11-17 | Stop reason: HOSPADM

## 2021-11-13 RX ORDER — MAGNESIUM SULFATE 1 G/100ML
1 INJECTION INTRAVENOUS
Status: DISCONTINUED | OUTPATIENT
Start: 2021-11-13 | End: 2021-11-17 | Stop reason: HOSPADM

## 2021-11-13 RX ORDER — TALC
6 POWDER (GRAM) TOPICAL NIGHTLY PRN
Status: DISCONTINUED | OUTPATIENT
Start: 2021-11-13 | End: 2021-11-17 | Stop reason: HOSPADM

## 2021-11-13 RX ORDER — METOPROLOL TARTRATE 50 MG/1
100 TABLET ORAL 2 TIMES DAILY
Status: DISCONTINUED | OUTPATIENT
Start: 2021-11-13 | End: 2021-11-13

## 2021-11-13 RX ORDER — NITROGLYCERIN 0.4 MG/1
0.4 TABLET SUBLINGUAL EVERY 5 MIN PRN
Status: DISCONTINUED | OUTPATIENT
Start: 2021-11-13 | End: 2021-11-17 | Stop reason: HOSPADM

## 2021-11-13 RX ADMIN — ENOXAPARIN SODIUM 40 MG: 40 INJECTION SUBCUTANEOUS at 09:11

## 2021-11-13 RX ADMIN — FUROSEMIDE 40 MG: 40 TABLET ORAL at 06:11

## 2021-11-13 RX ADMIN — Medication 1000 UNITS: at 04:11

## 2021-11-13 RX ADMIN — ATORVASTATIN CALCIUM 80 MG: 20 TABLET, FILM COATED ORAL at 04:11

## 2021-11-13 RX ADMIN — IPRATROPIUM BROMIDE AND ALBUTEROL SULFATE 3 ML: .5; 3 SOLUTION RESPIRATORY (INHALATION) at 10:11

## 2021-11-13 RX ADMIN — FLUOXETINE 40 MG: 20 CAPSULE ORAL at 04:11

## 2021-11-13 RX ADMIN — NITROGLYCERIN 1 PATCH: 0.4 PATCH TRANSDERMAL at 09:11

## 2021-11-13 RX ADMIN — ALPRAZOLAM 1 MG: 0.5 TABLET ORAL at 10:11

## 2021-11-13 RX ADMIN — PANTOPRAZOLE SODIUM 40 MG: 40 INJECTION, POWDER, LYOPHILIZED, FOR SOLUTION INTRAVENOUS at 09:11

## 2021-11-13 RX ADMIN — THERA TABS 1 TABLET: TAB at 06:11

## 2021-11-13 RX ADMIN — METOPROLOL TARTRATE 100 MG: 50 TABLET, FILM COATED ORAL at 06:11

## 2021-11-13 RX ADMIN — MELATONIN 6 MG: at 10:11

## 2021-11-13 RX ADMIN — OXYCODONE HYDROCHLORIDE AND ACETAMINOPHEN 500 MG: 500 TABLET ORAL at 09:11

## 2021-11-14 ENCOUNTER — CLINICAL SUPPORT (OUTPATIENT)
Dept: CARDIOLOGY | Facility: HOSPITAL | Age: 41
End: 2021-11-14
Attending: INTERNAL MEDICINE
Payer: MEDICAID

## 2021-11-14 ENCOUNTER — HOSPITAL ENCOUNTER (OUTPATIENT)
Dept: RADIOLOGY | Facility: HOSPITAL | Age: 41
Discharge: HOME | End: 2021-11-14
Attending: INTERNAL MEDICINE
Payer: MEDICAID

## 2021-11-14 VITALS — HEIGHT: 67 IN | WEIGHT: 293 LBS | BODY MASS INDEX: 45.99 KG/M2

## 2021-11-14 LAB
ANION GAP SERPL CALC-SCNC: 8 MMOL/L (ref 8–16)
AORTIC ROOT ANNULUS: 3.02 CM
AORTIC VALVE CUSP SEPERATION: 2.15 CM
AV INDEX (PROSTH): 0.92
AV MEAN GRADIENT: 6 MMHG
AV PEAK GRADIENT: 9 MMHG
AV VALVE AREA: 4.16 CM2
AV VELOCITY RATIO: 88.51
BASOPHILS # BLD AUTO: 0.04 K/UL (ref 0–0.2)
BASOPHILS NFR BLD: 0.4 % (ref 0–1.9)
BSA FOR ECHO PROCEDURE: 3.05 M2
BUN SERPL-MCNC: 19 MG/DL (ref 6–20)
CALCIUM SERPL-MCNC: 8.8 MG/DL (ref 8.7–10.5)
CHLORIDE SERPL-SCNC: 101 MMOL/L (ref 95–110)
CO2 SERPL-SCNC: 28 MMOL/L (ref 23–29)
CREAT SERPL-MCNC: 1.1 MG/DL (ref 0.5–1.4)
CV ECHO LV RWT: 0.48 CM
DIFFERENTIAL METHOD: ABNORMAL
DOP CALC AO PEAK VEL: 1.53 M/S
DOP CALC AO VTI: 31.45 CM
DOP CALC LVOT AREA: 4.5 CM2
DOP CALC LVOT DIAMETER: 2.4 CM
DOP CALC LVOT PEAK VEL: 135.42 M/S
DOP CALC LVOT STROKE VOLUME: 130.81 CM3
DOP CALCLVOT PEAK VEL VTI: 28.93 CM
E WAVE DECELERATION TIME: 173.51 MSEC
E/A RATIO: 1.27
E/E' RATIO: 9.4 M/S
ECHO LV POSTERIOR WALL: 1.33 CM (ref 0.6–1.1)
EJECTION FRACTION: 70 %
EOSINOPHIL # BLD AUTO: 0.3 K/UL (ref 0–0.5)
EOSINOPHIL NFR BLD: 3.3 % (ref 0–8)
ERYTHROCYTE [DISTWIDTH] IN BLOOD BY AUTOMATED COUNT: 14.1 % (ref 11.5–14.5)
EST. GFR  (AFRICAN AMERICAN): >60 ML/MIN/1.73 M^2
EST. GFR  (NON AFRICAN AMERICAN): >60 ML/MIN/1.73 M^2
FRACTIONAL SHORTENING: 40 % (ref 28–44)
GLUCOSE SERPL-MCNC: 159 MG/DL (ref 70–110)
HCT VFR BLD AUTO: 43.9 % (ref 37–48.5)
HGB BLD-MCNC: 13.5 G/DL (ref 12–16)
IMM GRANULOCYTES # BLD AUTO: 0.02 K/UL (ref 0–0.04)
IMM GRANULOCYTES NFR BLD AUTO: 0.2 % (ref 0–0.5)
INTERVENTRICULAR SEPTUM: 1.23 CM (ref 0.6–1.1)
IVRT: 116.03 MSEC
LEFT INTERNAL DIMENSION IN SYSTOLE: 3.32 CM (ref 2.1–4)
LEFT VENTRICLE MASS INDEX: 107 G/M2
LEFT VENTRICULAR INTERNAL DIMENSION IN DIASTOLE: 5.52 CM (ref 3.5–6)
LEFT VENTRICULAR MASS: 299.55 G
LV LATERAL E/E' RATIO: 8.55 M/S
LV SEPTAL E/E' RATIO: 10.44 M/S
LYMPHOCYTES # BLD AUTO: 1.7 K/UL (ref 1–4.8)
LYMPHOCYTES NFR BLD: 18.6 % (ref 18–48)
MAGNESIUM SERPL-MCNC: 2.2 MG/DL (ref 1.6–2.6)
MCH RBC QN AUTO: 28.1 PG (ref 27–31)
MCHC RBC AUTO-ENTMCNC: 30.8 G/DL (ref 32–36)
MCV RBC AUTO: 91 FL (ref 82–98)
MONOCYTES # BLD AUTO: 0.5 K/UL (ref 0.3–1)
MONOCYTES NFR BLD: 5.4 % (ref 4–15)
MV PEAK A VEL: 0.74 M/S
MV PEAK E VEL: 0.94 M/S
NEUTROPHILS # BLD AUTO: 6.5 K/UL (ref 1.8–7.7)
NEUTROPHILS NFR BLD: 72.1 % (ref 38–73)
NRBC BLD-RTO: 0 /100 WBC
PLATELET # BLD AUTO: 343 K/UL (ref 150–450)
PMV BLD AUTO: 10 FL (ref 9.2–12.9)
POTASSIUM SERPL-SCNC: 3.9 MMOL/L (ref 3.5–5.1)
PULM VEIN S/D RATIO: 1.15
PV PEAK D VEL: 50.52 M/S
PV PEAK S VEL: 58.34 M/S
PV PEAK VELOCITY: 115.7 CM/S
RA PRESSURE: 3 MMHG
RBC # BLD AUTO: 4.81 M/UL (ref 4–5.4)
RIGHT VENTRICULAR END-DIASTOLIC DIMENSION: 383 CM
SODIUM SERPL-SCNC: 137 MMOL/L (ref 136–145)
TDI LATERAL: 0.11 M/S
TDI SEPTAL: 0.09 M/S
TDI: 0.1 M/S
TROPONIN I SERPL DL<=0.01 NG/ML-MCNC: <0.03 NG/ML
WBC # BLD AUTO: 9.08 K/UL (ref 3.9–12.7)

## 2021-11-14 PROCEDURE — 99220 PR INITIAL OBSERVATION CARE,LEVL III: CPT | Mod: 25,,, | Performed by: INTERNAL MEDICINE

## 2021-11-14 PROCEDURE — 83735 ASSAY OF MAGNESIUM: CPT | Performed by: NURSE PRACTITIONER

## 2021-11-14 PROCEDURE — 80048 BASIC METABOLIC PNL TOTAL CA: CPT | Performed by: NURSE PRACTITIONER

## 2021-11-14 PROCEDURE — 36415 COLL VENOUS BLD VENIPUNCTURE: CPT | Performed by: INTERNAL MEDICINE

## 2021-11-14 PROCEDURE — 27000221 HC OXYGEN, UP TO 24 HOURS

## 2021-11-14 PROCEDURE — C9113 INJ PANTOPRAZOLE SODIUM, VIA: HCPCS | Performed by: INTERNAL MEDICINE

## 2021-11-14 PROCEDURE — 93306 TTE W/DOPPLER COMPLETE: CPT | Mod: 26,,, | Performed by: INTERNAL MEDICINE

## 2021-11-14 PROCEDURE — 63600175 PHARM REV CODE 636 W HCPCS: Performed by: NURSE PRACTITIONER

## 2021-11-14 PROCEDURE — 99220 PR INITIAL OBSERVATION CARE,LEVL III: ICD-10-PCS | Mod: 25,,, | Performed by: INTERNAL MEDICINE

## 2021-11-14 PROCEDURE — 25000003 PHARM REV CODE 250: Performed by: INTERNAL MEDICINE

## 2021-11-14 PROCEDURE — 84484 ASSAY OF TROPONIN QUANT: CPT | Performed by: INTERNAL MEDICINE

## 2021-11-14 PROCEDURE — 99900031 HC PATIENT EDUCATION (STAT)

## 2021-11-14 PROCEDURE — 63600175 PHARM REV CODE 636 W HCPCS: Performed by: INTERNAL MEDICINE

## 2021-11-14 PROCEDURE — 93306 TTE W/DOPPLER COMPLETE: CPT

## 2021-11-14 PROCEDURE — 96376 TX/PRO/DX INJ SAME DRUG ADON: CPT | Mod: 59

## 2021-11-14 PROCEDURE — 93306 ECHO (CUPID ONLY): ICD-10-PCS | Mod: 26,,, | Performed by: INTERNAL MEDICINE

## 2021-11-14 PROCEDURE — 99900035 HC TECH TIME PER 15 MIN (STAT)

## 2021-11-14 PROCEDURE — 94761 N-INVAS EAR/PLS OXIMETRY MLT: CPT

## 2021-11-14 PROCEDURE — 85025 COMPLETE CBC W/AUTO DIFF WBC: CPT | Performed by: NURSE PRACTITIONER

## 2021-11-14 PROCEDURE — 96372 THER/PROPH/DIAG INJ SC/IM: CPT | Mod: 59

## 2021-11-14 PROCEDURE — 25000003 PHARM REV CODE 250: Performed by: NURSE PRACTITIONER

## 2021-11-14 PROCEDURE — G0378 HOSPITAL OBSERVATION PER HR: HCPCS

## 2021-11-14 RX ORDER — BENZONATATE 100 MG/1
100 CAPSULE ORAL 3 TIMES DAILY PRN
Status: DISCONTINUED | OUTPATIENT
Start: 2021-11-14 | End: 2021-11-17 | Stop reason: HOSPADM

## 2021-11-14 RX ORDER — ALBUTEROL SULFATE 0.83 MG/ML
2.5 SOLUTION RESPIRATORY (INHALATION) EVERY 4 HOURS PRN
Status: DISCONTINUED | OUTPATIENT
Start: 2021-11-14 | End: 2021-11-17 | Stop reason: HOSPADM

## 2021-11-14 RX ORDER — HYDROCODONE BITARTRATE AND ACETAMINOPHEN 5; 325 MG/1; MG/1
1 TABLET ORAL EVERY 6 HOURS PRN
Status: DISCONTINUED | OUTPATIENT
Start: 2021-11-14 | End: 2021-11-17 | Stop reason: HOSPADM

## 2021-11-14 RX ADMIN — FUROSEMIDE 40 MG: 40 TABLET ORAL at 06:11

## 2021-11-14 RX ADMIN — DEXTROMETHORPHAN HYDROBROMIDE, GUAIFENESIN 1 TABLET: 30; 600 TABLET, EXTENDED RELEASE ORAL at 09:11

## 2021-11-14 RX ADMIN — THERA TABS 1 TABLET: TAB at 10:11

## 2021-11-14 RX ADMIN — ENOXAPARIN SODIUM 40 MG: 40 INJECTION SUBCUTANEOUS at 10:11

## 2021-11-14 RX ADMIN — METOPROLOL TARTRATE 100 MG: 50 TABLET, FILM COATED ORAL at 09:11

## 2021-11-14 RX ADMIN — METOPROLOL TARTRATE 100 MG: 50 TABLET, FILM COATED ORAL at 10:11

## 2021-11-14 RX ADMIN — ENOXAPARIN SODIUM 40 MG: 40 INJECTION SUBCUTANEOUS at 09:11

## 2021-11-14 RX ADMIN — FLUOXETINE 40 MG: 20 CAPSULE ORAL at 10:11

## 2021-11-14 RX ADMIN — LISINOPRIL 20 MG: 20 TABLET ORAL at 10:11

## 2021-11-14 RX ADMIN — ASPIRIN 81 MG 81 MG: 81 TABLET ORAL at 10:11

## 2021-11-14 RX ADMIN — Medication 1000 UNITS: at 10:11

## 2021-11-14 RX ADMIN — HYDROCODONE BITARTRATE AND ACETAMINOPHEN 1 TABLET: 5; 325 TABLET ORAL at 01:11

## 2021-11-14 RX ADMIN — OXYCODONE HYDROCHLORIDE AND ACETAMINOPHEN 500 MG: 500 TABLET ORAL at 09:11

## 2021-11-14 RX ADMIN — HYDROCODONE BITARTRATE AND ACETAMINOPHEN 1 TABLET: 5; 325 TABLET ORAL at 09:11

## 2021-11-14 RX ADMIN — OXYCODONE HYDROCHLORIDE AND ACETAMINOPHEN 500 MG: 500 TABLET ORAL at 10:11

## 2021-11-14 RX ADMIN — PANTOPRAZOLE SODIUM 40 MG: 40 INJECTION, POWDER, LYOPHILIZED, FOR SOLUTION INTRAVENOUS at 10:11

## 2021-11-14 RX ADMIN — MELATONIN 6 MG: at 09:11

## 2021-11-14 RX ADMIN — BENZONATATE 100 MG: 100 CAPSULE ORAL at 09:11

## 2021-11-14 RX ADMIN — ATORVASTATIN CALCIUM 80 MG: 40 TABLET, FILM COATED ORAL at 09:11

## 2021-11-14 RX ADMIN — NITROGLYCERIN 1 PATCH: 0.4 PATCH TRANSDERMAL at 01:11

## 2021-11-14 RX ADMIN — FUROSEMIDE 40 MG: 40 TABLET ORAL at 10:11

## 2021-11-14 RX ADMIN — POTASSIUM CHLORIDE 20 MEQ: 1500 TABLET, EXTENDED RELEASE ORAL at 07:11

## 2021-11-14 RX ADMIN — ALPRAZOLAM 1 MG: 0.5 TABLET ORAL at 09:11

## 2021-11-15 ENCOUNTER — HOSPITAL ENCOUNTER (OUTPATIENT)
Dept: RADIOLOGY | Facility: HOSPITAL | Age: 41
Discharge: HOME | End: 2021-11-15
Attending: INTERNAL MEDICINE
Payer: MEDICAID

## 2021-11-15 ENCOUNTER — CLINICAL SUPPORT (OUTPATIENT)
Dept: CARDIOLOGY | Facility: HOSPITAL | Age: 41
End: 2021-11-15
Attending: INTERNAL MEDICINE
Payer: MEDICAID

## 2021-11-15 LAB
CV PHARM DOSE: 0.4 MG
CV STRESS BASE HR: 72 BPM
DIASTOLIC BLOOD PRESSURE: 89 MMHG
OHS CV CPX 1 MINUTE RECOVERY HEART RATE: 85 BPM
OHS CV CPX 85 PERCENT MAX PREDICTED HEART RATE MALE: 145
OHS CV CPX MAX PREDICTED HEART RATE: 171
OHS CV CPX PATIENT IS FEMALE: 1
OHS CV CPX PATIENT IS MALE: 0
OHS CV CPX PEAK DIASTOLIC BLOOD PRESSURE: 94 MMHG
OHS CV CPX PEAK HEAR RATE: 89 BPM
OHS CV CPX PEAK RATE PRESSURE PRODUCT: NORMAL
OHS CV CPX PEAK SYSTOLIC BLOOD PRESSURE: 132 MMHG
OHS CV CPX PERCENT MAX PREDICTED HEART RATE ACHIEVED: 52
OHS CV CPX RATE PRESSURE PRODUCT PRESENTING: 9072
SYSTOLIC BLOOD PRESSURE: 126 MMHG

## 2021-11-15 PROCEDURE — 27000221 HC OXYGEN, UP TO 24 HOURS

## 2021-11-15 PROCEDURE — 96376 TX/PRO/DX INJ SAME DRUG ADON: CPT

## 2021-11-15 PROCEDURE — 63600175 PHARM REV CODE 636 W HCPCS: Performed by: INTERNAL MEDICINE

## 2021-11-15 PROCEDURE — 94761 N-INVAS EAR/PLS OXIMETRY MLT: CPT

## 2021-11-15 PROCEDURE — 25000003 PHARM REV CODE 250: Performed by: NURSE PRACTITIONER

## 2021-11-15 PROCEDURE — 96372 THER/PROPH/DIAG INJ SC/IM: CPT | Mod: 59

## 2021-11-15 PROCEDURE — 25000242 PHARM REV CODE 250 ALT 637 W/ HCPCS: Performed by: INTERNAL MEDICINE

## 2021-11-15 PROCEDURE — 25000003 PHARM REV CODE 250: Performed by: INTERNAL MEDICINE

## 2021-11-15 PROCEDURE — 99900031 HC PATIENT EDUCATION (STAT)

## 2021-11-15 PROCEDURE — 94640 AIRWAY INHALATION TREATMENT: CPT

## 2021-11-15 PROCEDURE — C9113 INJ PANTOPRAZOLE SODIUM, VIA: HCPCS | Performed by: INTERNAL MEDICINE

## 2021-11-15 PROCEDURE — 93016 CV STRESS TEST SUPVJ ONLY: CPT | Mod: ,,, | Performed by: INTERNAL MEDICINE

## 2021-11-15 PROCEDURE — 99900035 HC TECH TIME PER 15 MIN (STAT)

## 2021-11-15 PROCEDURE — 93018 CV STRESS TEST I&R ONLY: CPT | Mod: ,,, | Performed by: INTERNAL MEDICINE

## 2021-11-15 PROCEDURE — 63600175 PHARM REV CODE 636 W HCPCS

## 2021-11-15 PROCEDURE — 93018 NUCLEAR STRESS TEST (CUPID ONLY): ICD-10-PCS | Mod: ,,, | Performed by: INTERNAL MEDICINE

## 2021-11-15 PROCEDURE — A9502 TC99M TETROFOSMIN: HCPCS

## 2021-11-15 PROCEDURE — G0378 HOSPITAL OBSERVATION PER HR: HCPCS

## 2021-11-15 PROCEDURE — 99226 PR SUBSEQUENT OBSERVATION CARE,LEVEL III: CPT | Mod: GC,,, | Performed by: INTERNAL MEDICINE

## 2021-11-15 PROCEDURE — 63600175 PHARM REV CODE 636 W HCPCS: Performed by: NURSE PRACTITIONER

## 2021-11-15 PROCEDURE — 93017 CV STRESS TEST TRACING ONLY: CPT

## 2021-11-15 PROCEDURE — 78452 HT MUSCLE IMAGE SPECT MULT: CPT | Mod: TC

## 2021-11-15 PROCEDURE — 93016 NUCLEAR STRESS TEST (CUPID ONLY): ICD-10-PCS | Mod: ,,, | Performed by: INTERNAL MEDICINE

## 2021-11-15 PROCEDURE — 99226 PR SUBSEQUENT OBSERVATION CARE,LEVEL III: ICD-10-PCS | Mod: GC,,, | Performed by: INTERNAL MEDICINE

## 2021-11-15 RX ORDER — METHYLPREDNISOLONE ACETATE 40 MG/ML
40 INJECTION, SUSPENSION INTRA-ARTICULAR; INTRALESIONAL; INTRAMUSCULAR; SOFT TISSUE ONCE
Status: COMPLETED | OUTPATIENT
Start: 2021-11-15 | End: 2021-11-15

## 2021-11-15 RX ORDER — REGADENOSON 0.08 MG/ML
0.4 INJECTION, SOLUTION INTRAVENOUS ONCE
Status: COMPLETED | OUTPATIENT
Start: 2021-11-15 | End: 2021-11-15

## 2021-11-15 RX ADMIN — PANTOPRAZOLE SODIUM 40 MG: 40 INJECTION, POWDER, LYOPHILIZED, FOR SOLUTION INTRAVENOUS at 11:11

## 2021-11-15 RX ADMIN — FLUOXETINE 40 MG: 20 CAPSULE ORAL at 11:11

## 2021-11-15 RX ADMIN — FUROSEMIDE 40 MG: 40 TABLET ORAL at 12:11

## 2021-11-15 RX ADMIN — OXYCODONE HYDROCHLORIDE AND ACETAMINOPHEN 500 MG: 500 TABLET ORAL at 11:11

## 2021-11-15 RX ADMIN — THERA TABS 1 TABLET: TAB at 11:11

## 2021-11-15 RX ADMIN — FUROSEMIDE 40 MG: 40 TABLET ORAL at 06:11

## 2021-11-15 RX ADMIN — ENOXAPARIN SODIUM 40 MG: 40 INJECTION SUBCUTANEOUS at 11:11

## 2021-11-15 RX ADMIN — HYDROCODONE BITARTRATE AND ACETAMINOPHEN 1 TABLET: 5; 325 TABLET ORAL at 10:11

## 2021-11-15 RX ADMIN — REGADENOSON 0.4 MG: 0.08 INJECTION, SOLUTION INTRAVENOUS at 10:11

## 2021-11-15 RX ADMIN — DEXTROMETHORPHAN HYDROBROMIDE, GUAIFENESIN 1 TABLET: 30; 600 TABLET, EXTENDED RELEASE ORAL at 11:11

## 2021-11-15 RX ADMIN — ENOXAPARIN SODIUM 40 MG: 40 INJECTION SUBCUTANEOUS at 09:11

## 2021-11-15 RX ADMIN — METOPROLOL TARTRATE 100 MG: 50 TABLET, FILM COATED ORAL at 12:11

## 2021-11-15 RX ADMIN — DEXTROMETHORPHAN HYDROBROMIDE, GUAIFENESIN 1 TABLET: 30; 600 TABLET, EXTENDED RELEASE ORAL at 09:11

## 2021-11-15 RX ADMIN — Medication 1000 UNITS: at 11:11

## 2021-11-15 RX ADMIN — BENZONATATE 100 MG: 100 CAPSULE ORAL at 09:11

## 2021-11-15 RX ADMIN — OXYCODONE HYDROCHLORIDE AND ACETAMINOPHEN 500 MG: 500 TABLET ORAL at 09:11

## 2021-11-15 RX ADMIN — LISINOPRIL 20 MG: 20 TABLET ORAL at 12:11

## 2021-11-15 RX ADMIN — ALBUTEROL SULFATE 2.5 MG: 2.5 SOLUTION RESPIRATORY (INHALATION) at 08:11

## 2021-11-15 RX ADMIN — METHYLPREDNISOLONE ACETATE 40 MG: 40 INJECTION, SUSPENSION INTRA-ARTICULAR; INTRALESIONAL; INTRAMUSCULAR; SOFT TISSUE at 12:11

## 2021-11-15 RX ADMIN — ATORVASTATIN CALCIUM 80 MG: 40 TABLET, FILM COATED ORAL at 09:11

## 2021-11-15 RX ADMIN — METOPROLOL TARTRATE 100 MG: 50 TABLET, FILM COATED ORAL at 09:11

## 2021-11-15 RX ADMIN — ASPIRIN 81 MG 81 MG: 81 TABLET ORAL at 11:11

## 2021-11-15 RX ADMIN — ALPRAZOLAM 1 MG: 0.5 TABLET ORAL at 09:11

## 2021-11-15 RX ADMIN — NITROGLYCERIN 1 PATCH: 0.4 PATCH TRANSDERMAL at 12:11

## 2021-11-16 PROBLEM — R05.9 COUGH: Status: ACTIVE | Noted: 2021-11-16

## 2021-11-16 PROBLEM — K76.0 HEPATIC STEATOSIS: Status: ACTIVE | Noted: 2021-11-16

## 2021-11-16 PROBLEM — R94.39 POSITIVE CARDIAC STRESS TEST: Status: ACTIVE | Noted: 2021-11-16

## 2021-11-16 LAB
ALBUMIN SERPL BCP-MCNC: 3.8 G/DL (ref 3.5–5.2)
ALP SERPL-CCNC: 127 U/L (ref 55–135)
ALT SERPL W/O P-5'-P-CCNC: 36 U/L (ref 10–44)
AST SERPL-CCNC: 24 U/L (ref 10–40)
BILIRUB DIRECT SERPL-MCNC: 0.2 MG/DL (ref 0.1–0.3)
BILIRUB SERPL-MCNC: 1.7 MG/DL (ref 0.1–1)
PROT SERPL-MCNC: 8.1 G/DL (ref 6–8.4)

## 2021-11-16 PROCEDURE — 25000003 PHARM REV CODE 250: Performed by: NURSE PRACTITIONER

## 2021-11-16 PROCEDURE — 96372 THER/PROPH/DIAG INJ SC/IM: CPT | Mod: 59

## 2021-11-16 PROCEDURE — 25000003 PHARM REV CODE 250: Performed by: INTERNAL MEDICINE

## 2021-11-16 PROCEDURE — 63600175 PHARM REV CODE 636 W HCPCS: Performed by: INTERNAL MEDICINE

## 2021-11-16 PROCEDURE — 94640 AIRWAY INHALATION TREATMENT: CPT

## 2021-11-16 PROCEDURE — 27000221 HC OXYGEN, UP TO 24 HOURS

## 2021-11-16 PROCEDURE — 94660 CPAP INITIATION&MGMT: CPT

## 2021-11-16 PROCEDURE — 99226 PR SUBSEQUENT OBSERVATION CARE,LEVEL III: CPT | Mod: ,,, | Performed by: INTERNAL MEDICINE

## 2021-11-16 PROCEDURE — 96376 TX/PRO/DX INJ SAME DRUG ADON: CPT | Mod: 59

## 2021-11-16 PROCEDURE — G0378 HOSPITAL OBSERVATION PER HR: HCPCS

## 2021-11-16 PROCEDURE — 99900031 HC PATIENT EDUCATION (STAT)

## 2021-11-16 PROCEDURE — C9113 INJ PANTOPRAZOLE SODIUM, VIA: HCPCS | Performed by: INTERNAL MEDICINE

## 2021-11-16 PROCEDURE — 36415 COLL VENOUS BLD VENIPUNCTURE: CPT | Performed by: INTERNAL MEDICINE

## 2021-11-16 PROCEDURE — 25000242 PHARM REV CODE 250 ALT 637 W/ HCPCS: Performed by: INTERNAL MEDICINE

## 2021-11-16 PROCEDURE — 80076 HEPATIC FUNCTION PANEL: CPT | Performed by: INTERNAL MEDICINE

## 2021-11-16 PROCEDURE — 63600175 PHARM REV CODE 636 W HCPCS: Performed by: NURSE PRACTITIONER

## 2021-11-16 PROCEDURE — 99226 PR SUBSEQUENT OBSERVATION CARE,LEVEL III: ICD-10-PCS | Mod: ,,, | Performed by: INTERNAL MEDICINE

## 2021-11-16 PROCEDURE — 25000003 PHARM REV CODE 250

## 2021-11-16 PROCEDURE — 94761 N-INVAS EAR/PLS OXIMETRY MLT: CPT

## 2021-11-16 PROCEDURE — 99900035 HC TECH TIME PER 15 MIN (STAT)

## 2021-11-16 RX ORDER — DOXYCYCLINE 100 MG/1
100 CAPSULE ORAL EVERY 12 HOURS
Status: DISCONTINUED | OUTPATIENT
Start: 2021-11-16 | End: 2021-11-17 | Stop reason: HOSPADM

## 2021-11-16 RX ORDER — LOSARTAN POTASSIUM 25 MG/1
25 TABLET ORAL DAILY
Status: DISCONTINUED | OUTPATIENT
Start: 2021-11-16 | End: 2021-11-17 | Stop reason: HOSPADM

## 2021-11-16 RX ORDER — PANTOPRAZOLE SODIUM 40 MG/1
40 TABLET, DELAYED RELEASE ORAL DAILY
Status: DISCONTINUED | OUTPATIENT
Start: 2021-11-17 | End: 2021-11-17 | Stop reason: HOSPADM

## 2021-11-16 RX ADMIN — DOXYCYCLINE HYCLATE 100 MG: 100 CAPSULE ORAL at 08:11

## 2021-11-16 RX ADMIN — DEXTROMETHORPHAN HYDROBROMIDE, GUAIFENESIN 1 TABLET: 30; 600 TABLET, EXTENDED RELEASE ORAL at 08:11

## 2021-11-16 RX ADMIN — DEXTROMETHORPHAN HYDROBROMIDE, GUAIFENESIN 1 TABLET: 30; 600 TABLET, EXTENDED RELEASE ORAL at 09:11

## 2021-11-16 RX ADMIN — ALPRAZOLAM 1 MG: 0.5 TABLET ORAL at 11:11

## 2021-11-16 RX ADMIN — FUROSEMIDE 40 MG: 40 TABLET ORAL at 09:11

## 2021-11-16 RX ADMIN — OXYCODONE HYDROCHLORIDE AND ACETAMINOPHEN 500 MG: 500 TABLET ORAL at 08:11

## 2021-11-16 RX ADMIN — ASPIRIN 81 MG 81 MG: 81 TABLET ORAL at 09:11

## 2021-11-16 RX ADMIN — HYDROCODONE BITARTRATE AND ACETAMINOPHEN 1 TABLET: 5; 325 TABLET ORAL at 11:11

## 2021-11-16 RX ADMIN — NITROGLYCERIN 1 PATCH: 0.4 PATCH TRANSDERMAL at 09:11

## 2021-11-16 RX ADMIN — Medication 1000 UNITS: at 09:11

## 2021-11-16 RX ADMIN — METOPROLOL TARTRATE 100 MG: 50 TABLET, FILM COATED ORAL at 08:11

## 2021-11-16 RX ADMIN — DOXYCYCLINE HYCLATE 100 MG: 100 CAPSULE ORAL at 10:11

## 2021-11-16 RX ADMIN — ALBUTEROL SULFATE 2.5 MG: 2.5 SOLUTION RESPIRATORY (INHALATION) at 08:11

## 2021-11-16 RX ADMIN — LOSARTAN POTASSIUM 25 MG: 25 TABLET, FILM COATED ORAL at 12:11

## 2021-11-16 RX ADMIN — FUROSEMIDE 40 MG: 40 TABLET ORAL at 05:11

## 2021-11-16 RX ADMIN — THERA TABS 1 TABLET: TAB at 09:11

## 2021-11-16 RX ADMIN — METOPROLOL TARTRATE 100 MG: 50 TABLET, FILM COATED ORAL at 09:11

## 2021-11-16 RX ADMIN — ENOXAPARIN SODIUM 40 MG: 40 INJECTION SUBCUTANEOUS at 09:11

## 2021-11-16 RX ADMIN — OXYCODONE HYDROCHLORIDE AND ACETAMINOPHEN 500 MG: 500 TABLET ORAL at 09:11

## 2021-11-16 RX ADMIN — ACETAMINOPHEN 650 MG: 325 TABLET, FILM COATED ORAL at 09:11

## 2021-11-16 RX ADMIN — FLUOXETINE 40 MG: 20 CAPSULE ORAL at 09:11

## 2021-11-16 RX ADMIN — ATORVASTATIN CALCIUM 80 MG: 40 TABLET, FILM COATED ORAL at 08:11

## 2021-11-16 RX ADMIN — ENOXAPARIN SODIUM 40 MG: 40 INJECTION SUBCUTANEOUS at 08:11

## 2021-11-16 RX ADMIN — PANTOPRAZOLE SODIUM 40 MG: 40 INJECTION, POWDER, LYOPHILIZED, FOR SOLUTION INTRAVENOUS at 09:11

## 2021-11-16 RX ADMIN — MELATONIN 6 MG: at 11:11

## 2021-11-17 VITALS
RESPIRATION RATE: 20 BRPM | HEIGHT: 67 IN | HEART RATE: 72 BPM | DIASTOLIC BLOOD PRESSURE: 82 MMHG | TEMPERATURE: 98 F | WEIGHT: 293 LBS | BODY MASS INDEX: 45.99 KG/M2 | SYSTOLIC BLOOD PRESSURE: 131 MMHG | OXYGEN SATURATION: 92 %

## 2021-11-17 PROBLEM — R07.9 CHEST PAIN: Status: RESOLVED | Noted: 2021-11-13 | Resolved: 2021-11-17

## 2021-11-17 PROBLEM — J40 BRONCHITIS: Status: ACTIVE | Noted: 2021-11-17

## 2021-11-17 LAB
ANION GAP SERPL CALC-SCNC: 9 MMOL/L (ref 8–16)
BUN SERPL-MCNC: 17 MG/DL (ref 6–20)
CALCIUM SERPL-MCNC: 8.7 MG/DL (ref 8.7–10.5)
CHLORIDE SERPL-SCNC: 98 MMOL/L (ref 95–110)
CO2 SERPL-SCNC: 31 MMOL/L (ref 23–29)
CREAT SERPL-MCNC: 1.1 MG/DL (ref 0.5–1.4)
ERYTHROCYTE [DISTWIDTH] IN BLOOD BY AUTOMATED COUNT: 13.7 % (ref 11.5–14.5)
EST. GFR  (AFRICAN AMERICAN): >60 ML/MIN/1.73 M^2
EST. GFR  (NON AFRICAN AMERICAN): >60 ML/MIN/1.73 M^2
GLUCOSE SERPL-MCNC: 141 MG/DL (ref 70–110)
HCT VFR BLD AUTO: 44.9 % (ref 37–48.5)
HGB BLD-MCNC: 13.5 G/DL (ref 12–16)
MAGNESIUM SERPL-MCNC: 2 MG/DL (ref 1.6–2.6)
MCH RBC QN AUTO: 28.5 PG (ref 27–31)
MCHC RBC AUTO-ENTMCNC: 30.1 G/DL (ref 32–36)
MCV RBC AUTO: 95 FL (ref 82–98)
PHOSPHATE SERPL-MCNC: 2.6 MG/DL (ref 2.7–4.5)
PLATELET # BLD AUTO: 318 K/UL (ref 150–450)
PMV BLD AUTO: 10.5 FL (ref 9.2–12.9)
POTASSIUM SERPL-SCNC: 3.9 MMOL/L (ref 3.5–5.1)
RBC # BLD AUTO: 4.74 M/UL (ref 4–5.4)
SODIUM SERPL-SCNC: 138 MMOL/L (ref 136–145)
WBC # BLD AUTO: 6.65 K/UL (ref 3.9–12.7)

## 2021-11-17 PROCEDURE — 25000242 PHARM REV CODE 250 ALT 637 W/ HCPCS: Performed by: INTERNAL MEDICINE

## 2021-11-17 PROCEDURE — 96376 TX/PRO/DX INJ SAME DRUG ADON: CPT | Mod: 59

## 2021-11-17 PROCEDURE — 84100 ASSAY OF PHOSPHORUS: CPT | Performed by: INTERNAL MEDICINE

## 2021-11-17 PROCEDURE — 63600175 PHARM REV CODE 636 W HCPCS: Performed by: NURSE PRACTITIONER

## 2021-11-17 PROCEDURE — 96372 THER/PROPH/DIAG INJ SC/IM: CPT | Mod: 59

## 2021-11-17 PROCEDURE — G0378 HOSPITAL OBSERVATION PER HR: HCPCS

## 2021-11-17 PROCEDURE — 83735 ASSAY OF MAGNESIUM: CPT | Performed by: INTERNAL MEDICINE

## 2021-11-17 PROCEDURE — 96375 TX/PRO/DX INJ NEW DRUG ADDON: CPT

## 2021-11-17 PROCEDURE — 36415 COLL VENOUS BLD VENIPUNCTURE: CPT | Performed by: INTERNAL MEDICINE

## 2021-11-17 PROCEDURE — 25000003 PHARM REV CODE 250: Performed by: NURSE PRACTITIONER

## 2021-11-17 PROCEDURE — 85027 COMPLETE CBC AUTOMATED: CPT | Performed by: INTERNAL MEDICINE

## 2021-11-17 PROCEDURE — 25000003 PHARM REV CODE 250: Performed by: INTERNAL MEDICINE

## 2021-11-17 PROCEDURE — 80048 BASIC METABOLIC PNL TOTAL CA: CPT | Performed by: INTERNAL MEDICINE

## 2021-11-17 PROCEDURE — 63600175 PHARM REV CODE 636 W HCPCS: Performed by: INTERNAL MEDICINE

## 2021-11-17 PROCEDURE — 25000003 PHARM REV CODE 250

## 2021-11-17 RX ORDER — PANTOPRAZOLE SODIUM 40 MG/1
40 TABLET, DELAYED RELEASE ORAL DAILY
Qty: 30 TABLET | Refills: 0 | Status: SHIPPED | OUTPATIENT
Start: 2021-11-18 | End: 2022-10-11

## 2021-11-17 RX ORDER — FUROSEMIDE 10 MG/ML
40 INJECTION INTRAMUSCULAR; INTRAVENOUS ONCE
Status: COMPLETED | OUTPATIENT
Start: 2021-11-17 | End: 2021-11-17

## 2021-11-17 RX ORDER — DOXYCYCLINE 100 MG/1
100 CAPSULE ORAL EVERY 12 HOURS
Qty: 10 CAPSULE | Refills: 0 | Status: SHIPPED | OUTPATIENT
Start: 2021-11-17 | End: 2021-11-22

## 2021-11-17 RX ORDER — ATORVASTATIN CALCIUM 80 MG/1
80 TABLET, FILM COATED ORAL NIGHTLY
Qty: 30 TABLET | Refills: 0 | Status: SHIPPED | OUTPATIENT
Start: 2021-11-17 | End: 2021-12-17

## 2021-11-17 RX ORDER — CODEINE PHOSPHATE AND GUAIFENESIN 10; 100 MG/5ML; MG/5ML
5 SOLUTION ORAL EVERY 6 HOURS PRN
Qty: 236 ML | Refills: 0 | Status: SHIPPED | OUTPATIENT
Start: 2021-11-17 | End: 2021-11-22

## 2021-11-17 RX ORDER — PREDNISONE 20 MG/1
40 TABLET ORAL DAILY
Qty: 10 TABLET | Refills: 0 | Status: SHIPPED | OUTPATIENT
Start: 2021-11-17 | End: 2021-11-22

## 2021-11-17 RX ORDER — ISOSORBIDE MONONITRATE 30 MG/1
30 TABLET, EXTENDED RELEASE ORAL DAILY
Qty: 30 TABLET | Refills: 0 | Status: SHIPPED | OUTPATIENT
Start: 2021-11-18 | End: 2022-10-11

## 2021-11-17 RX ORDER — ALBUTEROL SULFATE 0.83 MG/ML
2.5 SOLUTION RESPIRATORY (INHALATION) EVERY 4 HOURS PRN
Status: DISCONTINUED | OUTPATIENT
Start: 2021-11-17 | End: 2021-11-17 | Stop reason: HOSPADM

## 2021-11-17 RX ORDER — ISOSORBIDE MONONITRATE 30 MG/1
30 TABLET, EXTENDED RELEASE ORAL DAILY
Status: DISCONTINUED | OUTPATIENT
Start: 2021-11-17 | End: 2021-11-17 | Stop reason: HOSPADM

## 2021-11-17 RX ORDER — ALBUTEROL SULFATE 90 UG/1
2 AEROSOL, METERED RESPIRATORY (INHALATION) EVERY 4 HOURS PRN
Qty: 18 G | Refills: 0 | Status: SHIPPED | OUTPATIENT
Start: 2021-11-17 | End: 2021-12-01

## 2021-11-17 RX ORDER — LOSARTAN POTASSIUM 25 MG/1
25 TABLET ORAL DAILY
Qty: 30 TABLET | Refills: 0 | Status: ON HOLD | OUTPATIENT
Start: 2021-11-18 | End: 2022-02-05 | Stop reason: HOSPADM

## 2021-11-17 RX ORDER — CODEINE PHOSPHATE AND GUAIFENESIN 10; 100 MG/5ML; MG/5ML
5 SOLUTION ORAL EVERY 4 HOURS PRN
Status: DISCONTINUED | OUTPATIENT
Start: 2021-11-17 | End: 2021-11-17 | Stop reason: HOSPADM

## 2021-11-17 RX ORDER — NAPROXEN SODIUM 220 MG/1
81 TABLET, FILM COATED ORAL DAILY
Qty: 30 TABLET | Refills: 0 | Status: SHIPPED | OUTPATIENT
Start: 2021-11-18 | End: 2022-10-11

## 2021-11-17 RX ADMIN — METHYLPREDNISOLONE SODIUM SUCCINATE 60 MG: 40 INJECTION, POWDER, FOR SOLUTION INTRAMUSCULAR; INTRAVENOUS at 11:11

## 2021-11-17 RX ADMIN — OXYCODONE HYDROCHLORIDE AND ACETAMINOPHEN 500 MG: 500 TABLET ORAL at 09:11

## 2021-11-17 RX ADMIN — ALPRAZOLAM 1 MG: 0.5 TABLET ORAL at 11:11

## 2021-11-17 RX ADMIN — DOXYCYCLINE HYCLATE 100 MG: 100 CAPSULE ORAL at 09:11

## 2021-11-17 RX ADMIN — PANTOPRAZOLE SODIUM 40 MG: 40 TABLET, DELAYED RELEASE ORAL at 05:11

## 2021-11-17 RX ADMIN — ASPIRIN 81 MG 81 MG: 81 TABLET ORAL at 09:11

## 2021-11-17 RX ADMIN — FLUOXETINE 40 MG: 20 CAPSULE ORAL at 09:11

## 2021-11-17 RX ADMIN — ALBUTEROL SULFATE 2.5 MG: 2.5 SOLUTION RESPIRATORY (INHALATION) at 11:11

## 2021-11-17 RX ADMIN — ISOSORBIDE MONONITRATE 30 MG: 30 TABLET, EXTENDED RELEASE ORAL at 09:11

## 2021-11-17 RX ADMIN — FUROSEMIDE 40 MG: 10 INJECTION, SOLUTION INTRAMUSCULAR; INTRAVENOUS at 09:11

## 2021-11-17 RX ADMIN — BENZONATATE 100 MG: 100 CAPSULE ORAL at 11:11

## 2021-11-17 RX ADMIN — METOPROLOL TARTRATE 100 MG: 50 TABLET, FILM COATED ORAL at 09:11

## 2021-11-17 RX ADMIN — DEXTROMETHORPHAN HYDROBROMIDE, GUAIFENESIN 1 TABLET: 30; 600 TABLET, EXTENDED RELEASE ORAL at 09:11

## 2021-11-17 RX ADMIN — ENOXAPARIN SODIUM 40 MG: 40 INJECTION SUBCUTANEOUS at 09:11

## 2021-11-17 RX ADMIN — LOSARTAN POTASSIUM 25 MG: 25 TABLET, FILM COATED ORAL at 09:11

## 2021-11-17 RX ADMIN — GUAIFENESIN AND CODEINE PHOSPHATE 5 ML: 100; 10 SOLUTION ORAL at 11:11

## 2021-11-17 RX ADMIN — THERA TABS 1 TABLET: TAB at 09:11

## 2021-11-17 RX ADMIN — Medication 1000 UNITS: at 09:11

## 2021-12-20 ENCOUNTER — TELEPHONE (OUTPATIENT)
Dept: CARDIOLOGY | Facility: HOSPITAL | Age: 41
End: 2021-12-20
Payer: MEDICAID

## 2021-12-20 ENCOUNTER — TELEPHONE (OUTPATIENT)
Dept: CARDIOLOGY | Facility: CLINIC | Age: 41
End: 2021-12-20
Payer: MEDICAID

## 2022-01-11 ENCOUNTER — HOSPITAL ENCOUNTER (OUTPATIENT)
Facility: HOSPITAL | Age: 42
Discharge: HOME | End: 2022-01-12
Attending: EMERGENCY MEDICINE | Admitting: INTERNAL MEDICINE
Payer: MEDICAID

## 2022-01-11 DIAGNOSIS — I10 HYPERTENSION, UNSPECIFIED TYPE: ICD-10-CM

## 2022-01-11 DIAGNOSIS — R07.9 CHEST PAIN: ICD-10-CM

## 2022-01-11 DIAGNOSIS — I25.10 CAD (CORONARY ARTERY DISEASE): ICD-10-CM

## 2022-01-11 DIAGNOSIS — R07.9 CHEST PAIN, UNSPECIFIED TYPE: Primary | ICD-10-CM

## 2022-01-11 DIAGNOSIS — E66.9 OBESITY, UNSPECIFIED CLASSIFICATION, UNSPECIFIED OBESITY TYPE, UNSPECIFIED WHETHER SERIOUS COMORBIDITY PRESENT: ICD-10-CM

## 2022-01-11 DIAGNOSIS — G47.33 OSA ON CPAP: ICD-10-CM

## 2022-01-11 LAB
ALBUMIN SERPL BCP-MCNC: 3.4 G/DL (ref 3.5–5.2)
ALP SERPL-CCNC: 133 U/L (ref 55–135)
ALT SERPL W/O P-5'-P-CCNC: 29 U/L (ref 10–44)
ANION GAP SERPL CALC-SCNC: 4 MMOL/L (ref 8–16)
AST SERPL-CCNC: 24 U/L (ref 10–40)
BASOPHILS # BLD AUTO: 0.04 K/UL (ref 0–0.2)
BASOPHILS # BLD AUTO: 0.04 K/UL (ref 0–0.2)
BASOPHILS NFR BLD: 0.5 % (ref 0–1.9)
BASOPHILS NFR BLD: 0.5 % (ref 0–1.9)
BILIRUB SERPL-MCNC: 1.4 MG/DL (ref 0.1–1)
BNP SERPL-MCNC: 26 PG/ML (ref 0–99)
BUN SERPL-MCNC: 12 MG/DL (ref 6–20)
CALCIUM SERPL-MCNC: 8.4 MG/DL (ref 8.7–10.5)
CHLORIDE SERPL-SCNC: 104 MMOL/L (ref 95–110)
CK MB SERPL-MCNC: 0.7 NG/ML (ref 0.1–6.5)
CK MB SERPL-MCNC: 0.8 NG/ML (ref 0.1–6.5)
CO2 SERPL-SCNC: 29 MMOL/L (ref 23–29)
CREAT SERPL-MCNC: 0.9 MG/DL (ref 0.5–1.4)
DIFFERENTIAL METHOD: ABNORMAL
DIFFERENTIAL METHOD: ABNORMAL
EOSINOPHIL # BLD AUTO: 0.3 K/UL (ref 0–0.5)
EOSINOPHIL # BLD AUTO: 0.3 K/UL (ref 0–0.5)
EOSINOPHIL NFR BLD: 3 % (ref 0–8)
EOSINOPHIL NFR BLD: 3 % (ref 0–8)
ERYTHROCYTE [DISTWIDTH] IN BLOOD BY AUTOMATED COUNT: 13.3 % (ref 11.5–14.5)
ERYTHROCYTE [DISTWIDTH] IN BLOOD BY AUTOMATED COUNT: 13.3 % (ref 11.5–14.5)
EST. GFR  (AFRICAN AMERICAN): >60 ML/MIN/1.73 M^2
EST. GFR  (NON AFRICAN AMERICAN): >60 ML/MIN/1.73 M^2
GLUCOSE SERPL-MCNC: 183 MG/DL (ref 70–110)
HCT VFR BLD AUTO: 44 % (ref 37–48.5)
HCT VFR BLD AUTO: 44 % (ref 37–48.5)
HGB BLD-MCNC: 13.6 G/DL (ref 12–16)
HGB BLD-MCNC: 13.6 G/DL (ref 12–16)
IMM GRANULOCYTES # BLD AUTO: 0.03 K/UL (ref 0–0.04)
IMM GRANULOCYTES # BLD AUTO: 0.03 K/UL (ref 0–0.04)
IMM GRANULOCYTES NFR BLD AUTO: 0.4 % (ref 0–0.5)
IMM GRANULOCYTES NFR BLD AUTO: 0.4 % (ref 0–0.5)
INR PPP: 1.1
LYMPHOCYTES # BLD AUTO: 1.6 K/UL (ref 1–4.8)
LYMPHOCYTES # BLD AUTO: 1.6 K/UL (ref 1–4.8)
LYMPHOCYTES NFR BLD: 19 % (ref 18–48)
LYMPHOCYTES NFR BLD: 19 % (ref 18–48)
MCH RBC QN AUTO: 28.6 PG (ref 27–31)
MCH RBC QN AUTO: 28.6 PG (ref 27–31)
MCHC RBC AUTO-ENTMCNC: 30.9 G/DL (ref 32–36)
MCHC RBC AUTO-ENTMCNC: 30.9 G/DL (ref 32–36)
MCV RBC AUTO: 92 FL (ref 82–98)
MCV RBC AUTO: 92 FL (ref 82–98)
MONOCYTES # BLD AUTO: 0.4 K/UL (ref 0.3–1)
MONOCYTES # BLD AUTO: 0.4 K/UL (ref 0.3–1)
MONOCYTES NFR BLD: 4.3 % (ref 4–15)
MONOCYTES NFR BLD: 4.3 % (ref 4–15)
NEUTROPHILS # BLD AUTO: 6 K/UL (ref 1.8–7.7)
NEUTROPHILS # BLD AUTO: 6 K/UL (ref 1.8–7.7)
NEUTROPHILS NFR BLD: 72.8 % (ref 38–73)
NEUTROPHILS NFR BLD: 72.8 % (ref 38–73)
NRBC BLD-RTO: 0 /100 WBC
NRBC BLD-RTO: 0 /100 WBC
PLATELET # BLD AUTO: 317 K/UL (ref 150–450)
PLATELET # BLD AUTO: 317 K/UL (ref 150–450)
PMV BLD AUTO: 10.8 FL (ref 9.2–12.9)
PMV BLD AUTO: 10.8 FL (ref 9.2–12.9)
POTASSIUM SERPL-SCNC: 3.9 MMOL/L (ref 3.5–5.1)
PROT SERPL-MCNC: 7.4 G/DL (ref 6–8.4)
PROTHROMBIN TIME: 13.2 SEC (ref 11.4–13.7)
RBC # BLD AUTO: 4.76 M/UL (ref 4–5.4)
RBC # BLD AUTO: 4.76 M/UL (ref 4–5.4)
SARS-COV-2 RDRP RESP QL NAA+PROBE: NEGATIVE
SODIUM SERPL-SCNC: 137 MMOL/L (ref 136–145)
TROPONIN I SERPL DL<=0.01 NG/ML-MCNC: <0.03 NG/ML
WBC # BLD AUTO: 8.3 K/UL (ref 3.9–12.7)
WBC # BLD AUTO: 8.3 K/UL (ref 3.9–12.7)

## 2022-01-11 PROCEDURE — 63600175 PHARM REV CODE 636 W HCPCS: Performed by: INTERNAL MEDICINE

## 2022-01-11 PROCEDURE — 25000003 PHARM REV CODE 250: Performed by: INTERNAL MEDICINE

## 2022-01-11 PROCEDURE — 27000221 HC OXYGEN, UP TO 24 HOURS

## 2022-01-11 PROCEDURE — 96374 THER/PROPH/DIAG INJ IV PUSH: CPT

## 2022-01-11 PROCEDURE — 84484 ASSAY OF TROPONIN QUANT: CPT | Performed by: EMERGENCY MEDICINE

## 2022-01-11 PROCEDURE — 25000003 PHARM REV CODE 250: Performed by: EMERGENCY MEDICINE

## 2022-01-11 PROCEDURE — 85610 PROTHROMBIN TIME: CPT | Performed by: EMERGENCY MEDICINE

## 2022-01-11 PROCEDURE — 94640 AIRWAY INHALATION TREATMENT: CPT

## 2022-01-11 PROCEDURE — 99285 EMERGENCY DEPT VISIT HI MDM: CPT | Mod: 25

## 2022-01-11 PROCEDURE — 93010 EKG 12-LEAD: ICD-10-PCS | Mod: ,,, | Performed by: INTERNAL MEDICINE

## 2022-01-11 PROCEDURE — 82553 CREATINE MB FRACTION: CPT | Mod: 91 | Performed by: INTERNAL MEDICINE

## 2022-01-11 PROCEDURE — 80053 COMPREHEN METABOLIC PANEL: CPT | Performed by: EMERGENCY MEDICINE

## 2022-01-11 PROCEDURE — 99900035 HC TECH TIME PER 15 MIN (STAT)

## 2022-01-11 PROCEDURE — U0002 COVID-19 LAB TEST NON-CDC: HCPCS | Performed by: EMERGENCY MEDICINE

## 2022-01-11 PROCEDURE — 83880 ASSAY OF NATRIURETIC PEPTIDE: CPT | Performed by: EMERGENCY MEDICINE

## 2022-01-11 PROCEDURE — 25000242 PHARM REV CODE 250 ALT 637 W/ HCPCS: Performed by: EMERGENCY MEDICINE

## 2022-01-11 PROCEDURE — 93010 ELECTROCARDIOGRAM REPORT: CPT | Mod: ,,, | Performed by: INTERNAL MEDICINE

## 2022-01-11 PROCEDURE — 63600175 PHARM REV CODE 636 W HCPCS: Performed by: EMERGENCY MEDICINE

## 2022-01-11 PROCEDURE — 36415 COLL VENOUS BLD VENIPUNCTURE: CPT | Performed by: INTERNAL MEDICINE

## 2022-01-11 PROCEDURE — 93005 ELECTROCARDIOGRAM TRACING: CPT | Performed by: INTERNAL MEDICINE

## 2022-01-11 PROCEDURE — 99900031 HC PATIENT EDUCATION (STAT)

## 2022-01-11 PROCEDURE — 99213 PR OFFICE/OUTPT VISIT, EST, LEVL III, 20-29 MIN: ICD-10-PCS | Mod: ,,, | Performed by: INTERNAL MEDICINE

## 2022-01-11 PROCEDURE — G0378 HOSPITAL OBSERVATION PER HR: HCPCS

## 2022-01-11 PROCEDURE — 84484 ASSAY OF TROPONIN QUANT: CPT | Mod: 91 | Performed by: INTERNAL MEDICINE

## 2022-01-11 PROCEDURE — 96372 THER/PROPH/DIAG INJ SC/IM: CPT | Mod: 59

## 2022-01-11 PROCEDURE — 94761 N-INVAS EAR/PLS OXIMETRY MLT: CPT

## 2022-01-11 PROCEDURE — 85025 COMPLETE CBC W/AUTO DIFF WBC: CPT | Performed by: EMERGENCY MEDICINE

## 2022-01-11 PROCEDURE — 25000242 PHARM REV CODE 250 ALT 637 W/ HCPCS: Performed by: INTERNAL MEDICINE

## 2022-01-11 PROCEDURE — 99213 OFFICE O/P EST LOW 20 MIN: CPT | Mod: ,,, | Performed by: INTERNAL MEDICINE

## 2022-01-11 RX ORDER — MORPHINE SULFATE 4 MG/ML
4 INJECTION, SOLUTION INTRAMUSCULAR; INTRAVENOUS
Status: COMPLETED | OUTPATIENT
Start: 2022-01-11 | End: 2022-01-11

## 2022-01-11 RX ORDER — METOPROLOL SUCCINATE 25 MG/1
25 TABLET, EXTENDED RELEASE ORAL DAILY
COMMUNITY
Start: 2021-12-28

## 2022-01-11 RX ORDER — DILTIAZEM HYDROCHLORIDE 180 MG/1
360 CAPSULE, COATED, EXTENDED RELEASE ORAL DAILY
Status: DISCONTINUED | OUTPATIENT
Start: 2022-01-11 | End: 2022-01-12 | Stop reason: HOSPADM

## 2022-01-11 RX ORDER — ONDANSETRON 2 MG/ML
4 INJECTION INTRAMUSCULAR; INTRAVENOUS EVERY 6 HOURS PRN
Status: DISCONTINUED | OUTPATIENT
Start: 2022-01-11 | End: 2022-01-12 | Stop reason: HOSPADM

## 2022-01-11 RX ORDER — DILTIAZEM HYDROCHLORIDE 240 MG/1
CAPSULE, EXTENDED RELEASE ORAL
COMMUNITY
End: 2022-01-11

## 2022-01-11 RX ORDER — IPRATROPIUM BROMIDE AND ALBUTEROL SULFATE 2.5; .5 MG/3ML; MG/3ML
3 SOLUTION RESPIRATORY (INHALATION)
Status: DISCONTINUED | OUTPATIENT
Start: 2022-01-11 | End: 2022-01-12 | Stop reason: HOSPADM

## 2022-01-11 RX ORDER — ONDANSETRON 4 MG/1
8 TABLET, ORALLY DISINTEGRATING ORAL EVERY 6 HOURS PRN
Status: DISCONTINUED | OUTPATIENT
Start: 2022-01-11 | End: 2022-01-12 | Stop reason: HOSPADM

## 2022-01-11 RX ORDER — POLYETHYLENE GLYCOL 3350 17 G/17G
17 POWDER, FOR SOLUTION ORAL DAILY PRN
Status: DISCONTINUED | OUTPATIENT
Start: 2022-01-11 | End: 2022-01-12 | Stop reason: HOSPADM

## 2022-01-11 RX ORDER — ATORVASTATIN CALCIUM 40 MG/1
80 TABLET, FILM COATED ORAL NIGHTLY
Status: DISCONTINUED | OUTPATIENT
Start: 2022-01-11 | End: 2022-01-12 | Stop reason: HOSPADM

## 2022-01-11 RX ORDER — NITROGLYCERIN 0.4 MG/1
0.4 TABLET SUBLINGUAL EVERY 5 MIN PRN
Status: DISCONTINUED | OUTPATIENT
Start: 2022-01-11 | End: 2022-01-12 | Stop reason: HOSPADM

## 2022-01-11 RX ORDER — ASPIRIN 325 MG
325 TABLET ORAL
Status: COMPLETED | OUTPATIENT
Start: 2022-01-11 | End: 2022-01-11

## 2022-01-11 RX ORDER — DILTIAZEM HYDROCHLORIDE 360 MG/1
360 CAPSULE, EXTENDED RELEASE ORAL DAILY
COMMUNITY
End: 2022-10-11 | Stop reason: DRUGHIGH

## 2022-01-11 RX ORDER — ZOLPIDEM TARTRATE 5 MG/1
10 TABLET ORAL NIGHTLY PRN
Status: DISCONTINUED | OUTPATIENT
Start: 2022-01-11 | End: 2022-01-12 | Stop reason: HOSPADM

## 2022-01-11 RX ORDER — ZOLPIDEM TARTRATE 10 MG/1
1 TABLET ORAL NIGHTLY
Status: ON HOLD | COMMUNITY
Start: 2021-12-29 | End: 2022-02-05 | Stop reason: HOSPADM

## 2022-01-11 RX ORDER — METFORMIN HYDROCHLORIDE 500 MG/1
500 TABLET ORAL 2 TIMES DAILY
COMMUNITY
Start: 2022-01-02 | End: 2022-01-11

## 2022-01-11 RX ORDER — NITROGLYCERIN 0.4 MG/1
0.4 TABLET SUBLINGUAL EVERY 5 MIN PRN
Status: COMPLETED | OUTPATIENT
Start: 2022-01-11 | End: 2022-01-11

## 2022-01-11 RX ORDER — NAPROXEN SODIUM 220 MG/1
81 TABLET, FILM COATED ORAL DAILY
Status: DISCONTINUED | OUTPATIENT
Start: 2022-01-12 | End: 2022-01-12 | Stop reason: HOSPADM

## 2022-01-11 RX ORDER — CYCLOBENZAPRINE HCL 10 MG
10 TABLET ORAL 2 TIMES DAILY
Status: DISCONTINUED | OUTPATIENT
Start: 2022-01-11 | End: 2022-01-12 | Stop reason: HOSPADM

## 2022-01-11 RX ORDER — ERGOCALCIFEROL 1.25 MG/1
50000 CAPSULE ORAL
COMMUNITY
Start: 2022-01-02

## 2022-01-11 RX ORDER — ALPRAZOLAM 0.5 MG/1
2 TABLET ORAL 3 TIMES DAILY PRN
Status: DISCONTINUED | OUTPATIENT
Start: 2022-01-11 | End: 2022-01-12 | Stop reason: HOSPADM

## 2022-01-11 RX ORDER — ENOXAPARIN SODIUM 100 MG/ML
40 INJECTION SUBCUTANEOUS EVERY 12 HOURS
Status: DISCONTINUED | OUTPATIENT
Start: 2022-01-11 | End: 2022-01-12 | Stop reason: HOSPADM

## 2022-01-11 RX ORDER — SPIRONOLACTONE 50 MG/1
50 TABLET, FILM COATED ORAL DAILY
COMMUNITY
Start: 2022-01-02 | End: 2022-01-11

## 2022-01-11 RX ORDER — HYDRALAZINE HYDROCHLORIDE 20 MG/ML
10 INJECTION INTRAMUSCULAR; INTRAVENOUS EVERY 4 HOURS PRN
Status: DISCONTINUED | OUTPATIENT
Start: 2022-01-11 | End: 2022-01-12 | Stop reason: HOSPADM

## 2022-01-11 RX ORDER — SODIUM CHLORIDE 9 MG/ML
INJECTION, SOLUTION INTRAVENOUS ONCE
Status: COMPLETED | OUTPATIENT
Start: 2022-01-11 | End: 2022-01-12

## 2022-01-11 RX ORDER — FUROSEMIDE 40 MG/1
40 TABLET ORAL
Status: COMPLETED | OUTPATIENT
Start: 2022-01-11 | End: 2022-01-11

## 2022-01-11 RX ORDER — SODIUM CHLORIDE 0.9 % (FLUSH) 0.9 %
10 SYRINGE (ML) INJECTION
Status: DISCONTINUED | OUTPATIENT
Start: 2022-01-11 | End: 2022-01-12 | Stop reason: HOSPADM

## 2022-01-11 RX ORDER — LOSARTAN POTASSIUM 25 MG/1
25 TABLET ORAL DAILY
Status: DISCONTINUED | OUTPATIENT
Start: 2022-01-11 | End: 2022-01-12 | Stop reason: HOSPADM

## 2022-01-11 RX ORDER — PANTOPRAZOLE SODIUM 40 MG/1
40 TABLET, DELAYED RELEASE ORAL DAILY
Status: DISCONTINUED | OUTPATIENT
Start: 2022-01-11 | End: 2022-01-12 | Stop reason: HOSPADM

## 2022-01-11 RX ORDER — ACETAMINOPHEN 325 MG/1
650 TABLET ORAL EVERY 4 HOURS PRN
Status: DISCONTINUED | OUTPATIENT
Start: 2022-01-11 | End: 2022-01-12 | Stop reason: HOSPADM

## 2022-01-11 RX ORDER — CYCLOBENZAPRINE HCL 10 MG
1 TABLET ORAL 2 TIMES DAILY
COMMUNITY
Start: 2021-12-01

## 2022-01-11 RX ORDER — HYDROCODONE BITARTRATE AND ACETAMINOPHEN 10; 325 MG/1; MG/1
1 TABLET ORAL EVERY 8 HOURS PRN
Status: DISCONTINUED | OUTPATIENT
Start: 2022-01-11 | End: 2022-01-12 | Stop reason: HOSPADM

## 2022-01-11 RX ORDER — ISOSORBIDE MONONITRATE 30 MG/1
30 TABLET, EXTENDED RELEASE ORAL DAILY
Status: DISCONTINUED | OUTPATIENT
Start: 2022-01-11 | End: 2022-01-12 | Stop reason: HOSPADM

## 2022-01-11 RX ORDER — IPRATROPIUM BROMIDE AND ALBUTEROL SULFATE 2.5; .5 MG/3ML; MG/3ML
3 SOLUTION RESPIRATORY (INHALATION)
COMMUNITY
Start: 2021-12-08

## 2022-01-11 RX ORDER — METOPROLOL SUCCINATE 25 MG/1
25 TABLET, EXTENDED RELEASE ORAL DAILY
Status: DISCONTINUED | OUTPATIENT
Start: 2022-01-11 | End: 2022-01-12 | Stop reason: HOSPADM

## 2022-01-11 RX ORDER — ENOXAPARIN SODIUM 100 MG/ML
40 INJECTION SUBCUTANEOUS EVERY 24 HOURS
Status: DISCONTINUED | OUTPATIENT
Start: 2022-01-11 | End: 2022-01-11

## 2022-01-11 RX ORDER — FLUOXETINE HYDROCHLORIDE 20 MG/1
40 CAPSULE ORAL DAILY
Status: DISCONTINUED | OUTPATIENT
Start: 2022-01-11 | End: 2022-01-12 | Stop reason: HOSPADM

## 2022-01-11 RX ORDER — CLONIDINE 0.2 MG/24H
1 PATCH, EXTENDED RELEASE TRANSDERMAL
Status: DISCONTINUED | OUTPATIENT
Start: 2022-01-11 | End: 2022-01-12 | Stop reason: HOSPADM

## 2022-01-11 RX ORDER — FUROSEMIDE 40 MG/1
40 TABLET ORAL 2 TIMES DAILY
Status: DISCONTINUED | OUTPATIENT
Start: 2022-01-11 | End: 2022-01-12 | Stop reason: HOSPADM

## 2022-01-11 RX ADMIN — MORPHINE SULFATE 4 MG: 4 INJECTION, SOLUTION INTRAMUSCULAR; INTRAVENOUS at 09:01

## 2022-01-11 RX ADMIN — HYDROCODONE BITARTRATE AND ACETAMINOPHEN 1 TABLET: 10; 325 TABLET ORAL at 01:01

## 2022-01-11 RX ADMIN — ASPIRIN 325 MG ORAL TABLET 325 MG: 325 PILL ORAL at 07:01

## 2022-01-11 RX ADMIN — DILTIAZEM HYDROCHLORIDE 360 MG: 180 CAPSULE, COATED, EXTENDED RELEASE ORAL at 10:01

## 2022-01-11 RX ADMIN — NITROGLYCERIN 0.4 MG: 0.4 TABLET SUBLINGUAL at 07:01

## 2022-01-11 RX ADMIN — METOPROLOL SUCCINATE 25 MG: 25 TABLET, EXTENDED RELEASE ORAL at 10:01

## 2022-01-11 RX ADMIN — PANTOPRAZOLE SODIUM 40 MG: 40 TABLET, DELAYED RELEASE ORAL at 12:01

## 2022-01-11 RX ADMIN — CYCLOBENZAPRINE 10 MG: 10 TABLET, FILM COATED ORAL at 08:01

## 2022-01-11 RX ADMIN — FUROSEMIDE 40 MG: 40 TABLET ORAL at 09:01

## 2022-01-11 RX ADMIN — FUROSEMIDE 40 MG: 40 TABLET ORAL at 06:01

## 2022-01-11 RX ADMIN — IPRATROPIUM BROMIDE AND ALBUTEROL SULFATE 3 ML: .5; 3 SOLUTION RESPIRATORY (INHALATION) at 09:01

## 2022-01-11 RX ADMIN — LOSARTAN POTASSIUM 25 MG: 25 TABLET, FILM COATED ORAL at 10:01

## 2022-01-11 RX ADMIN — CYCLOBENZAPRINE 10 MG: 10 TABLET, FILM COATED ORAL at 12:01

## 2022-01-11 RX ADMIN — CLONIDINE 1 PATCH: 0.2 PATCH TRANSDERMAL at 12:01

## 2022-01-11 RX ADMIN — ISOSORBIDE MONONITRATE 30 MG: 30 TABLET, EXTENDED RELEASE ORAL at 10:01

## 2022-01-11 RX ADMIN — FLUOXETINE 40 MG: 20 CAPSULE ORAL at 12:01

## 2022-01-11 RX ADMIN — NITROGLYCERIN 1 INCH: 20 OINTMENT TOPICAL at 09:01

## 2022-01-11 RX ADMIN — ENOXAPARIN SODIUM 40 MG: 40 INJECTION SUBCUTANEOUS at 08:01

## 2022-01-11 RX ADMIN — ATORVASTATIN CALCIUM 80 MG: 40 TABLET, FILM COATED ORAL at 08:01

## 2022-01-11 NOTE — ED NOTES
Pt resting comfortably, denies any needs, cardiac monitoring in place, on 2L NC, resp even and unlabored, skin dry and warm. Chest tightness relieved. Call device within reach, will cont to monitor.

## 2022-01-11 NOTE — ED NOTES
Oxygen placed at 2 lpm via nasal cannula due to saturation level 92% on room air. C/o tightness to chest still

## 2022-01-11 NOTE — SUBJECTIVE & OBJECTIVE
Past Medical History:   Diagnosis Date    Anxiety     COVID-19     Depression     Heart attack     Hyperlipidemia     Hypertension     Obesity     Obesity        Past Surgical History:   Procedure Laterality Date    CHOLECYSTECTOMY      HYSTERECTOMY         Review of patient's allergies indicates:  No Known Allergies    No current facility-administered medications on file prior to encounter.     Current Outpatient Medications on File Prior to Encounter   Medication Sig    albuterol (VENTOLIN HFA) 90 mcg/actuation inhaler Inhale 2 puffs into the lungs every 4 (four) hours as needed for Wheezing or Shortness of Breath. Rescue    albuterol-ipratropium (DUO-NEB) 2.5 mg-0.5 mg/3 mL nebulizer solution Take 3 mLs by nebulization every 6 (six) hours while awake.    alprazolam (XANAX) 2 MG Tab Take 2 mg by mouth 3 (three) times daily as needed (anxiety).     aspirin 81 MG Chew Take 1 tablet (81 mg total) by mouth once daily.    atorvastatin (LIPITOR) 80 MG tablet Take 1 tablet (80 mg total) by mouth every evening.    cloNIDine 0.2 mg/24 hr td ptwk (CATAPRES) 0.2 mg/24 hr Place 1 patch onto the skin every 7 days.    cyclobenzaprine (FLEXERIL) 10 MG tablet Take 1 tablet by mouth 2 (two) times a day.    diltiaZEM (TIAZAC) 360 MG Cs24 Take 360 mg by mouth once daily.    ergocalciferol (ERGOCALCIFEROL) 50,000 unit Cap Take 50,000 Units by mouth every 7 days.    fluoxetine (PROZAC) 40 MG capsule Take 40 mg by mouth once daily.    furosemide (LASIX) 40 MG tablet Take 40 mg by mouth 2 (two) times daily.    hydrocodone-acetaminophen 10-325mg (NORCO)  mg Tab Take 1 tablet by mouth every 8 (eight) hours as needed for Pain.    isosorbide mononitrate (IMDUR) 30 MG 24 hr tablet Take 1 tablet (30 mg total) by mouth once daily.    losartan (COZAAR) 25 MG tablet Take 1 tablet (25 mg total) by mouth once daily.    metoprolol succinate (TOPROL-XL) 25 MG 24 hr tablet Take 25 mg by mouth once daily.     multivitamin Tab Take 1 tablet by mouth once daily.    pantoprazole (PROTONIX) 40 MG tablet Take 1 tablet (40 mg total) by mouth once daily.    zolpidem (AMBIEN) 10 mg Tab Take 1 tablet by mouth every evening.    [DISCONTINUED] ascorbic acid, vitamin C, (VITAMIN C) 500 MG tablet Take 1 tablet (500 mg total) by mouth 2 (two) times daily.    [DISCONTINUED] diltiaZEM (TIAZAC) 240 MG Cs24 diltiazem  mg capsule,extended release 24 hr    [DISCONTINUED] metFORMIN (GLUCOPHAGE) 500 MG tablet Take 500 mg by mouth 2 (two) times daily.    [DISCONTINUED] metoprolol tartrate (LOPRESSOR) 50 MG tablet Take 100 mg by mouth 2 (two) times daily.     [DISCONTINUED] spironolactone (ALDACTONE) 50 MG tablet Take 50 mg by mouth once daily.    [DISCONTINUED] vitamin D (VITAMIN D3) 1000 units Tab Take 1 tablet (1,000 Units total) by mouth once daily.     Family History     Problem Relation (Age of Onset)    No Known Problems Mother, Father        Tobacco Use    Smoking status: Never Smoker    Smokeless tobacco: Not on file   Substance and Sexual Activity    Alcohol use: No    Drug use: Not on file    Sexual activity: Yes     Birth control/protection: None     Review of Systems   Constitutional: Negative.    HENT: Negative.    Eyes: Negative.    Respiratory: Negative.    Cardiovascular: Positive for chest pain.   Gastrointestinal: Negative.    Endocrine: Negative.    Genitourinary: Negative.    Musculoskeletal: Negative.    Skin: Negative.    Allergic/Immunologic: Negative.    Neurological: Negative.    Hematological: Negative.    Psychiatric/Behavioral: Negative.      Objective:     Vital Signs (Most Recent):  Temp: 97.8 °F (36.6 °C) (01/11/22 0649)  Pulse: 81 (01/11/22 1002)  Resp: (!) 25 (01/11/22 1002)  BP: (!) 184/91 (01/11/22 1002)  SpO2: 96 % (01/11/22 1002) Vital Signs (24h Range):  Temp:  [97.8 °F (36.6 °C)] 97.8 °F (36.6 °C)  Pulse:  [81-94] 81  Resp:  [17-27] 25  SpO2:  [92 %-98 %] 96 %  BP: (172-218)/()  184/91     Weight: (!) 208.7 kg (460 lb)  Body mass index is 72.05 kg/m².    Physical Exam  Vitals and nursing note reviewed.   Constitutional:       General: She is not in acute distress.     Appearance: She is well-developed.   HENT:      Head: Normocephalic and atraumatic.      Mouth/Throat:      Mouth: Oropharynx is clear and moist.   Eyes:      Extraocular Movements: EOM normal.      Pupils: Pupils are equal, round, and reactive to light.   Cardiovascular:      Rate and Rhythm: Regular rhythm.      Heart sounds: No murmur heard.      Pulmonary:      Effort: Pulmonary effort is normal.      Breath sounds: Normal breath sounds. No wheezing.      Comments: O2 per NC  Abdominal:      General: There is no distension.      Palpations: Abdomen is soft.      Tenderness: There is no abdominal tenderness. There is no guarding or rebound.   Musculoskeletal:         General: Normal range of motion.      Cervical back: Normal range of motion.   Skin:     Findings: No rash.   Neurological:      Mental Status: She is alert and oriented to person, place, and time.      Cranial Nerves: No cranial nerve deficit.      Sensory: No sensory deficit.   Psychiatric:         Mood and Affect: Mood and affect normal.           CRANIAL NERVES     CN III, IV, VI   Pupils are equal, round, and reactive to light.  Extraocular motions are normal.        Significant Labs:   All pertinent labs within the past 24 hours have been reviewed.  CBC:   Recent Labs   Lab 01/11/22  0730   WBC 8.30  8.30   HGB 13.6  13.6   HCT 44.0  44.0     317     CMP:   Recent Labs   Lab 01/11/22  0730      K 3.9      CO2 29   *   BUN 12   CREATININE 0.9   CALCIUM 8.4*   PROT 7.4   ALBUMIN 3.4*   BILITOT 1.4*   ALKPHOS 133   AST 24   ALT 29   ANIONGAP 4*   EGFRNONAA >60.0     Cardiac Markers:   Recent Labs   Lab 01/11/22  0730   BNP 26     Troponin:   Recent Labs   Lab 01/11/22  0730   TROPONINI <0.030  <0.030       Significant Imaging:  I have reviewed all pertinent imaging results/findings within the past 24 hours.  EKG: I have reviewed all pertinent results/findings within the past 24 hours and my personal findings are: NSR.  No ST elevation.

## 2022-01-11 NOTE — ED NOTES
Pt saturation level - 93%. Oxygen at  2lpm via nasal cannula initiated. Oxygen saturation level increased to 97% on 2 lpm

## 2022-01-11 NOTE — ED NOTES
Messaged pharmacy about home medications to be sent to ED. Received message from Bynum that once medications are verified, they will be sent via tube system.

## 2022-01-11 NOTE — PROGRESS NOTES
Pharmacist Renal Dose Adjustment Note    Maribel Hernandez is a 41 y.o. female being treated with the medication enoxaparin    Patient Data:    Vital Signs (Most Recent):  Temp: 97.8 °F (36.6 °C) (01/11/22 0649)  Pulse: 77 (01/11/22 1029)  Resp: (!) 22 (01/11/22 1029)  BP: (!) 186/74 (01/11/22 1029)  SpO2: 96 % (01/11/22 1029)   Vital Signs (72h Range):  Temp:  [97.8 °F (36.6 °C)]   Pulse:  [77-94]   Resp:  [17-27]   BP: (172-218)/()   SpO2:  [92 %-98 %]      Recent Labs   Lab 01/11/22  0730   CREATININE 0.9     Serum creatinine: 0.9 mg/dL 01/11/22 0730  Estimated creatinine clearance: 156.4 mL/min    Enoxaparin 40 mg dose: every 24 hours frequency will be changed to enoxaparin 40 mg dose: every 12 hours frequency for BMI > 40    Pharmacist's Name: Misa Braun  Pharmacist's Extension: [phone]

## 2022-01-11 NOTE — H&P
[facility] - Emergency Dept  Hospital Medicine  History & Physical    Patient Name: Maribel Hernandez  MRN: [id number]  Patient Class: OP- Observation  Admission Date: 1/11/2022  Attending Physician: Sabina Grant MD  Primary Care Provider: Graciela Mercer NP         Patient information was obtained from patient and ER records.     Subjective:     Principal Problem:Chest pain    Chief Complaint:   Chief Complaint   Patient presents with    Chest Pain     THIS AM        HPI: 41 year old female presents with chest pain that started this morning while at rest. She reports pain is substernal, across her upper back and then down her left arm (tingling down her arm).  Pain has been waxing and waning but persistent.  Received NTG and greatly improved the pain and now she only feels a pressure.  She denies further episodes of pain since her November evaluation.  Per chart review, she was admitted November 2021. Myocardial perfusion scan showed a possible inferior area on LV of RI. Cardiac CTA was attempted but patient exceeded the weight limit. Cardiology recommended sending to [facility] for PET scan but it was determined that this is not done in the inpatient setting- only the outpatient setting.  Patient was discharged with recommended follow up. She tells me that her insurance denied the PET scan and she could not get a new patient Cardiology appointment until May and thus has not been able to follow up as of yet.  Also feels that her left leg is more swollen since yesterday.  Does not feel that her right leg is swollen. Denies NSAID use.  Denies pain is associated with food.  She was being treated for bronchitis back in November but denies any respiratory symptoms at this time.  First Troponin in the ED is normal. EKG with no ST elevation.      Past Medical History:   Diagnosis Date    Anxiety     COVID-19     Depression     Heart attack     Hyperlipidemia     Hypertension      Obesity     Obesity        Past Surgical History:   Procedure Laterality Date    CHOLECYSTECTOMY      HYSTERECTOMY         Review of patient's allergies indicates:  No Known Allergies    No current facility-administered medications on file prior to encounter.     Current Outpatient Medications on File Prior to Encounter   Medication Sig    albuterol (VENTOLIN HFA) 90 mcg/actuation inhaler Inhale 2 puffs into the lungs every 4 (four) hours as needed for Wheezing or Shortness of Breath. Rescue    albuterol-ipratropium (DUO-NEB) 2.5 mg-0.5 mg/3 mL nebulizer solution Take 3 mLs by nebulization every 6 (six) hours while awake.    alprazolam (XANAX) 2 MG Tab Take 2 mg by mouth 3 (three) times daily as needed (anxiety).     aspirin 81 MG Chew Take 1 tablet (81 mg total) by mouth once daily.    atorvastatin (LIPITOR) 80 MG tablet Take 1 tablet (80 mg total) by mouth every evening.    cloNIDine 0.2 mg/24 hr td ptwk (CATAPRES) 0.2 mg/24 hr Place 1 patch onto the skin every 7 days.    cyclobenzaprine (FLEXERIL) 10 MG tablet Take 1 tablet by mouth 2 (two) times a day.    diltiaZEM (TIAZAC) 360 MG Cs24 Take 360 mg by mouth once daily.    ergocalciferol (ERGOCALCIFEROL) 50,000 unit Cap Take 50,000 Units by mouth every 7 days.    fluoxetine (PROZAC) 40 MG capsule Take 40 mg by mouth once daily.    furosemide (LASIX) 40 MG tablet Take 40 mg by mouth 2 (two) times daily.    hydrocodone-acetaminophen 10-325mg (NORCO)  mg Tab Take 1 tablet by mouth every 8 (eight) hours as needed for Pain.    isosorbide mononitrate (IMDUR) 30 MG 24 hr tablet Take 1 tablet (30 mg total) by mouth once daily.    losartan (COZAAR) 25 MG tablet Take 1 tablet (25 mg total) by mouth once daily.    metoprolol succinate (TOPROL-XL) 25 MG 24 hr tablet Take 25 mg by mouth once daily.    multivitamin Tab Take 1 tablet by mouth once daily.    pantoprazole (PROTONIX) 40 MG tablet Take 1 tablet (40 mg total) by mouth once daily.     zolpidem (AMBIEN) 10 mg Tab Take 1 tablet by mouth every evening.    [DISCONTINUED] ascorbic acid, vitamin C, (VITAMIN C) 500 MG tablet Take 1 tablet (500 mg total) by mouth 2 (two) times daily.    [DISCONTINUED] diltiaZEM (TIAZAC) 240 MG Cs24 diltiazem  mg capsule,extended release 24 hr    [DISCONTINUED] metFORMIN (GLUCOPHAGE) 500 MG tablet Take 500 mg by mouth 2 (two) times daily.    [DISCONTINUED] metoprolol tartrate (LOPRESSOR) 50 MG tablet Take 100 mg by mouth 2 (two) times daily.     [DISCONTINUED] spironolactone (ALDACTONE) 50 MG tablet Take 50 mg by mouth once daily.    [DISCONTINUED] vitamin D (VITAMIN D3) 1000 units Tab Take 1 tablet (1,000 Units total) by mouth once daily.     Family History     Problem Relation (Age of Onset)    No Known Problems Mother, Father        Tobacco Use    Smoking status: Never Smoker    Smokeless tobacco: Not on file   Substance and Sexual Activity    Alcohol use: No    Drug use: Not on file    Sexual activity: Yes     Birth control/protection: None     Review of Systems   Constitutional: Negative.    HENT: Negative.    Eyes: Negative.    Respiratory: Negative.    Cardiovascular: Positive for chest pain.   Gastrointestinal: Negative.    Endocrine: Negative.    Genitourinary: Negative.    Musculoskeletal: Negative.    Skin: Negative.    Allergic/Immunologic: Negative.    Neurological: Negative.    Hematological: Negative.    Psychiatric/Behavioral: Negative.      Objective:     Vital Signs (Most Recent):  Temp: 97.8 °F (36.6 °C) (01/11/22 0649)  Pulse: 81 (01/11/22 1002)  Resp: (!) 25 (01/11/22 1002)  BP: (!) 184/91 (01/11/22 1002)  SpO2: 96 % (01/11/22 1002) Vital Signs (24h Range):  Temp:  [97.8 °F (36.6 °C)] 97.8 °F (36.6 °C)  Pulse:  [81-94] 81  Resp:  [17-27] 25  SpO2:  [92 %-98 %] 96 %  BP: (172-218)/() 184/91     Weight: (!) 208.7 kg (460 lb)  Body mass index is 72.05 kg/m².    Physical Exam  Vitals and nursing note reviewed.   Constitutional:        General: She is not in acute distress.     Appearance: She is well-developed.   HENT:      Head: Normocephalic and atraumatic.      Mouth/Throat:      Mouth: Oropharynx is clear and moist.   Eyes:      Extraocular Movements: EOM normal.      Pupils: Pupils are equal, round, and reactive to light.   Cardiovascular:      Rate and Rhythm: Regular rhythm.      Heart sounds: No murmur heard.      Pulmonary:      Effort: Pulmonary effort is normal.      Breath sounds: Normal breath sounds. No wheezing.      Comments: O2 per NC  Abdominal:      General: There is no distension.      Palpations: Abdomen is soft.      Tenderness: There is no abdominal tenderness. There is no guarding or rebound.   Musculoskeletal:         General: Normal range of motion.      Cervical back: Normal range of motion.   Skin:     Findings: No rash.   Neurological:      Mental Status: She is alert and oriented to person, place, and time.      Cranial Nerves: No cranial nerve deficit.      Sensory: No sensory deficit.   Psychiatric:         Mood and Affect: Mood and affect normal.           CRANIAL NERVES     CN III, IV, VI   Pupils are equal, round, and reactive to light.  Extraocular motions are normal.        Significant Labs:   All pertinent labs within the past 24 hours have been reviewed.  CBC:   Recent Labs   Lab 01/11/22  0730   WBC 8.30  8.30   HGB 13.6  13.6   HCT 44.0  44.0     317     CMP:   Recent Labs   Lab 01/11/22  0730      K 3.9      CO2 29   *   BUN 12   CREATININE 0.9   CALCIUM 8.4*   PROT 7.4   ALBUMIN 3.4*   BILITOT 1.4*   ALKPHOS 133   AST 24   ALT 29   ANIONGAP 4*   EGFRNONAA >60.0     Cardiac Markers:   Recent Labs   Lab 01/11/22  0730   BNP 26     Troponin:   Recent Labs   Lab 01/11/22  0730   TROPONINI <0.030  <0.030       Significant Imaging: I have reviewed all pertinent imaging results/findings within the past 24 hours.  EKG: I have reviewed all pertinent results/findings within the past  24 hours and my personal findings are: NSR.  No ST elevation.    Assessment/Plan:     Active Hospital Problems    Diagnosis    *Chest pain    Positive cardiac stress test    Class 3 severe obesity due to excess calories with serious comorbidity and body mass index (BMI) of 60.0 to 69.9 in adult    Essential hypertension    COLIN on CPAP     Plan:    -Patient with history of positive stress test 11/2021 being admitted with chest pain.  -Cardiology consulted  -Trending Komal  -Nitropaste ordered in the ED and has significantly improved her pain.  If returns, consideration for tridil gtt.  -Blood pressure is up.  Her home medication is being administered now. She tells me she is compliant with her medication, checks her pressure regularly and it has been well controlled.  This is unusual for her.  This may be pain mediated though I also wondered if elevated pressure could be causing her pain.  -Checking LE US given reports of LLE swelling.  -I am keeping her NPO presently given unclear clinical course pending cardiology evaluation.  It does not appear that her insurance approved the previously recommended PET which can only be done in the outpatient setting.  -Ordering home CPAP QHS  -DVT Px with lovenox     VTE Risk Mitigation (From admission, onward)         Ordered     enoxaparin injection 40 mg  Daily         01/11/22 1016     IP VTE HIGH RISK PATIENT  Once         01/11/22 1016     Place sequential compression device  Until discontinued         01/11/22 1016                   Sabina Grant MD  Department of Hospital Medicine   [facility] - Emergency Dept

## 2022-01-11 NOTE — HPI
41 year old female presents with chest pain that started this morning while at rest. She reports pain is substernal, across her upper back and then down her left arm (tingling down her arm).  Pain has been waxing and waning but persistent.  Received NTG and greatly improved the pain and now she only feels a pressure.  She denies further episodes of pain since her November evaluation.  Per chart review, she was admitted November 2021. Myocardial perfusion scan showed a possible inferior area on LV of RI. Cardiac CTA was attempted but patient exceeded the weight limit. Cardiology recommended sending to [facility] [place] for PET scan but it was determined that this is not done in the inpatient setting- only the outpatient setting.  Patient was discharged with recommended follow up. She tells me that her insurance denied the PET scan and she could not get a new patient Cardiology appointment until May and thus has not been able to follow up as of yet.  Also feels that her left leg is more swollen since yesterday.  Does not feel that her right leg is swollen. Denies NSAID use.  Denies pain is associated with food.  She was being treated for bronchitis back in November but denies any respiratory symptoms at this time.  First Troponin in the ED is normal. EKG with no ST elevation.

## 2022-01-11 NOTE — ED NOTES
Pt resting comfortably with eyes closed, snoring. Easily arousable to voice, denies chest pain at this time

## 2022-01-11 NOTE — ED NOTES
Spoke with pharmacy - inquired about medications ordered. Spoke with Bynum. States once medications are looked at and verified, she will send via tube system. Information relayed to incoming primary nurse

## 2022-01-11 NOTE — ED PROVIDER NOTES
Encounter Date: 1/11/2022       History     Chief Complaint   Patient presents with    Chest Pain     THIS AM     41-year-old female who has a history of depression, coronary disease, hypertension, hyperlipidemia, obesity and when had COVID-19, had a recent hospitalization in November for chest pain and at that time was attempted to be transferred to [facility] for a cardiac PET scan which was apparently this approved by her insurance, now presents with complaints of chest pain it began this morning shortly prior to arrival.  Patient describes the pain is anterior with associated shortness of breath.  She admits she has had some exertional chest pain recently.  She took 1 baby aspirin this morning but no nitrates.  She did have nausea and vomiting.  She describes her chest pain as tight in nature.  No palpitations.  Patient denies any history of diabetes.        Review of patient's allergies indicates:  No Known Allergies  Past Medical History:   Diagnosis Date    Anxiety     COVID-19     Depression     Heart attack     Hyperlipidemia     Hypertension     Obesity     Obesity      Past Surgical History:   Procedure Laterality Date    CHOLECYSTECTOMY      HYSTERECTOMY       Family History   Family history unknown: Yes     Social History     Tobacco Use    Smoking status: Never Smoker   Substance Use Topics    Alcohol use: No     Review of Systems   Constitutional: Negative for activity change, chills, diaphoresis and fever.   HENT: Negative.  Negative for sore throat.    Respiratory: Positive for cough, chest tightness and shortness of breath.    Cardiovascular: Positive for chest pain.   Gastrointestinal: Positive for nausea and vomiting.   Genitourinary: Negative.  Negative for dysuria.   Musculoskeletal: Negative for back pain.   Skin: Negative.  Negative for rash.   Neurological: Negative for weakness.   Hematological: Does not bruise/bleed easily.   All other systems reviewed and are  negative.      Physical Exam     Initial Vitals [01/11/22 0649]   BP Pulse Resp Temp SpO2   (!) 178/109 94 20 97.8 °F (36.6 °C) 95 %      MAP       --         Physical Exam    Vitals reviewed.  Constitutional: She appears well-developed and well-nourished. She is not diaphoretic. No distress.   Obese   HENT:   Head: Normocephalic and atraumatic.   Nose: Nose normal.   Mouth/Throat: Oropharynx is clear and moist. No oropharyngeal exudate.   Eyes: Conjunctivae are normal. Pupils are equal, round, and reactive to light. Right eye exhibits no discharge. Left eye exhibits no discharge. No scleral icterus.   Neck: Neck supple. No thyromegaly present. No tracheal deviation present. No JVD present.   Normal range of motion.  Cardiovascular: Normal rate, regular rhythm, normal heart sounds and intact distal pulses. Exam reveals no gallop and no friction rub.    No murmur heard.  Pulmonary/Chest: Breath sounds normal. No stridor. No respiratory distress. She has no wheezes. She has no rhonchi. She has no rales. She exhibits no tenderness.   Abdominal: Abdomen is soft. Bowel sounds are normal. She exhibits no distension and no mass. There is no abdominal tenderness. There is no rebound and no guarding.   Musculoskeletal:         General: No tenderness or edema. Normal range of motion.      Cervical back: Normal range of motion and neck supple.     Lymphadenopathy:     She has no cervical adenopathy.   Neurological: She is alert and oriented to person, place, and time. She has normal strength. GCS score is 15. GCS eye subscore is 4. GCS verbal subscore is 5. GCS motor subscore is 6.   Skin: Skin is warm and dry. Capillary refill takes less than 2 seconds. No rash noted. No erythema. No pallor.   Psychiatric: She has a normal mood and affect. Her behavior is normal. Judgment and thought content normal.         ED Course   Procedures  Labs Reviewed   CBC W/ AUTO DIFFERENTIAL - Abnormal; Notable for the following components:        Result Value    MCHC 30.9 (*)     All other components within normal limits   COMPREHENSIVE METABOLIC PANEL - Abnormal; Notable for the following components:    Glucose 183 (*)     Calcium 8.4 (*)     Albumin 3.4 (*)     Total Bilirubin 1.4 (*)     Anion Gap 4 (*)     All other components within normal limits   CBC W/ AUTO DIFFERENTIAL - Abnormal; Notable for the following components:    MCHC 30.9 (*)     All other components within normal limits   TROPONIN I   PROTIME-INR   SARS-COV-2 RNA AMPLIFICATION, QUAL   TROPONIN I   B-TYPE NATRIURETIC PEPTIDE        ECG Results          EKG 12-lead (In process)  Result time 01/11/22 07:27:20    In process by Interface, Lab In [facility] (01/11/22 07:27:20)                 Narrative:    Test Reason : R07.9,    Vent. Rate : 093 BPM     Atrial Rate : 093 BPM     P-R Int : 164 ms          QRS Dur : 092 ms      QT Int : 364 ms       P-R-T Axes : 068 056 030 degrees     QTc Int : 452 ms    Normal sinus rhythm  Nonspecific ST abnormality  Abnormal ECG  When compared with ECG of 13-NOV-2021 08:26,  No significant change was found    Referred By: [email or web address]   SELF           Confirmed By:                             Imaging Results          X-Ray Chest AP Portable (Final result)  Result time 01/11/22 07:33:43    Final result by Ricki Molina MD (01/11/22 07:33:43)                 Narrative:    CLINICAL HISTORY:  41 years (1980) Female CHEST PAIN Chest pain / hx of hysterectomy    TECHNIQUE:  Portable AP radiograph the chest.    COMPARISON:  Most recent radiograph from November 16, 2021    FINDINGS:  The lungs are essentially clear, noting mild central hilar bronchovascular crowding. Costophrenic angles are seen without effusion. No pneumothorax is identified. The heart is normal in size. The mediastinum is within normal limits. Osseous structures show degenerative changes in the spine. The visualized upper abdomen is unremarkable.    IMPRESSION:  No acute cardiac or pulmonary  process.                  .            Electronically signed by:  Ricki Molina MD  1/11/2022 7:33 AM [facility] Workstation: [id number]                               Medications   nitroGLYCERIN 2% TD oint ointment 1 inch (has no administration in time range)   morphine injection 4 mg (has no administration in time range)   metoprolol succinate (TOPROL-XL) 24 hr tablet 25 mg (has no administration in time range)   aspirin tablet 325 mg (325 mg Oral Given 1/11/22 0755)   nitroGLYCERIN SL tablet 0.4 mg (0.4 mg Sublingual Given 1/11/22 0756)                Attending Attestation:             Attending ED Notes:   This 41-year-old female who was no coronary disease and multiple other risk factors, presented with complaints of chest pain for which he had only taken aspirin prior to arrival, was given sublingual nitrates with some improvement but not complete resolution of her pain.  As a consequence patient will require hospitalization to telemetry unit by hospital medicine service               Clinical Impression:   Final diagnoses:  [R07.9] Chest pain  [R07.9] Chest pain, unspecified type (Primary)  [I10] Hypertension, unspecified type  [E66.9] Obesity, unspecified classification, unspecified obesity type, unspecified whether serious comorbidity present                 Carlos Harding Jr., MD  01/11/22 0909       Carlos Harding Jr., MD  01/11/22 1038

## 2022-01-12 VITALS
HEART RATE: 78 BPM | SYSTOLIC BLOOD PRESSURE: 129 MMHG | OXYGEN SATURATION: 94 % | HEIGHT: 67 IN | BODY MASS INDEX: 45.99 KG/M2 | TEMPERATURE: 98 F | RESPIRATION RATE: 20 BRPM | WEIGHT: 293 LBS | DIASTOLIC BLOOD PRESSURE: 78 MMHG

## 2022-01-12 PROBLEM — R07.9 CHEST PAIN: Status: RESOLVED | Noted: 2021-11-13 | Resolved: 2022-01-12

## 2022-01-12 LAB
ALBUMIN SERPL BCP-MCNC: 3.6 G/DL (ref 3.5–5.2)
ALP SERPL-CCNC: 126 U/L (ref 55–135)
ALT SERPL W/O P-5'-P-CCNC: 31 U/L (ref 10–44)
ANION GAP SERPL CALC-SCNC: 9 MMOL/L (ref 8–16)
AST SERPL-CCNC: 20 U/L (ref 10–40)
BASOPHILS # BLD AUTO: 0.05 K/UL (ref 0–0.2)
BASOPHILS NFR BLD: 0.5 % (ref 0–1.9)
BILIRUB SERPL-MCNC: 1.8 MG/DL (ref 0.1–1)
BUN SERPL-MCNC: 12 MG/DL (ref 6–20)
CALCIUM SERPL-MCNC: 8.3 MG/DL (ref 8.7–10.5)
CHLORIDE SERPL-SCNC: 100 MMOL/L (ref 95–110)
CO2 SERPL-SCNC: 30 MMOL/L (ref 23–29)
CREAT SERPL-MCNC: 1 MG/DL (ref 0.5–1.4)
DIFFERENTIAL METHOD: ABNORMAL
EOSINOPHIL # BLD AUTO: 0.3 K/UL (ref 0–0.5)
EOSINOPHIL NFR BLD: 2.6 % (ref 0–8)
ERYTHROCYTE [DISTWIDTH] IN BLOOD BY AUTOMATED COUNT: 13.2 % (ref 11.5–14.5)
EST. GFR  (AFRICAN AMERICAN): >60 ML/MIN/1.73 M^2
EST. GFR  (NON AFRICAN AMERICAN): >60 ML/MIN/1.73 M^2
GLUCOSE SERPL-MCNC: 140 MG/DL (ref 70–110)
HCT VFR BLD AUTO: 42.6 % (ref 37–48.5)
HGB BLD-MCNC: 12.6 G/DL (ref 12–16)
IMM GRANULOCYTES # BLD AUTO: 0.03 K/UL (ref 0–0.04)
IMM GRANULOCYTES NFR BLD AUTO: 0.3 % (ref 0–0.5)
LYMPHOCYTES # BLD AUTO: 1.5 K/UL (ref 1–4.8)
LYMPHOCYTES NFR BLD: 14.6 % (ref 18–48)
MAGNESIUM SERPL-MCNC: 1.9 MG/DL (ref 1.6–2.6)
MCH RBC QN AUTO: 28 PG (ref 27–31)
MCHC RBC AUTO-ENTMCNC: 29.6 G/DL (ref 32–36)
MCV RBC AUTO: 95 FL (ref 82–98)
MONOCYTES # BLD AUTO: 0.4 K/UL (ref 0.3–1)
MONOCYTES NFR BLD: 3.6 % (ref 4–15)
NEUTROPHILS # BLD AUTO: 7.9 K/UL (ref 1.8–7.7)
NEUTROPHILS NFR BLD: 78.4 % (ref 38–73)
NRBC BLD-RTO: 0 /100 WBC
PLATELET # BLD AUTO: 354 K/UL (ref 150–450)
PMV BLD AUTO: 10.6 FL (ref 9.2–12.9)
POTASSIUM SERPL-SCNC: 4.1 MMOL/L (ref 3.5–5.1)
PROT SERPL-MCNC: 7.5 G/DL (ref 6–8.4)
RBC # BLD AUTO: 4.5 M/UL (ref 4–5.4)
SODIUM SERPL-SCNC: 139 MMOL/L (ref 136–145)
WBC # BLD AUTO: 10.08 K/UL (ref 3.9–12.7)

## 2022-01-12 PROCEDURE — 93010 ELECTROCARDIOGRAM REPORT: CPT | Mod: ,,, | Performed by: INTERNAL MEDICINE

## 2022-01-12 PROCEDURE — 94799 UNLISTED PULMONARY SVC/PX: CPT

## 2022-01-12 PROCEDURE — 25000242 PHARM REV CODE 250 ALT 637 W/ HCPCS: Performed by: INTERNAL MEDICINE

## 2022-01-12 PROCEDURE — 36415 COLL VENOUS BLD VENIPUNCTURE: CPT | Performed by: INTERNAL MEDICINE

## 2022-01-12 PROCEDURE — 63600175 PHARM REV CODE 636 W HCPCS: Performed by: INTERNAL MEDICINE

## 2022-01-12 PROCEDURE — 25000003 PHARM REV CODE 250: Performed by: INTERNAL MEDICINE

## 2022-01-12 PROCEDURE — 83735 ASSAY OF MAGNESIUM: CPT | Performed by: INTERNAL MEDICINE

## 2022-01-12 PROCEDURE — G0378 HOSPITAL OBSERVATION PER HR: HCPCS

## 2022-01-12 PROCEDURE — 85025 COMPLETE CBC W/AUTO DIFF WBC: CPT | Performed by: INTERNAL MEDICINE

## 2022-01-12 PROCEDURE — 93005 ELECTROCARDIOGRAM TRACING: CPT | Mod: 59 | Performed by: INTERNAL MEDICINE

## 2022-01-12 PROCEDURE — 27000221 HC OXYGEN, UP TO 24 HOURS

## 2022-01-12 PROCEDURE — 99900035 HC TECH TIME PER 15 MIN (STAT)

## 2022-01-12 PROCEDURE — 94761 N-INVAS EAR/PLS OXIMETRY MLT: CPT | Mod: XB

## 2022-01-12 PROCEDURE — 80053 COMPREHEN METABOLIC PANEL: CPT | Performed by: INTERNAL MEDICINE

## 2022-01-12 PROCEDURE — 96372 THER/PROPH/DIAG INJ SC/IM: CPT | Mod: 59

## 2022-01-12 PROCEDURE — 94618 PULMONARY STRESS TESTING: CPT

## 2022-01-12 PROCEDURE — 94640 AIRWAY INHALATION TREATMENT: CPT

## 2022-01-12 PROCEDURE — 93010 EKG 12-LEAD: ICD-10-PCS | Mod: ,,, | Performed by: INTERNAL MEDICINE

## 2022-01-12 PROCEDURE — 96361 HYDRATE IV INFUSION ADD-ON: CPT

## 2022-01-12 PROCEDURE — 99900031 HC PATIENT EDUCATION (STAT)

## 2022-01-12 RX ADMIN — DILTIAZEM HYDROCHLORIDE 360 MG: 180 CAPSULE, COATED, EXTENDED RELEASE ORAL at 09:01

## 2022-01-12 RX ADMIN — FLUOXETINE 40 MG: 20 CAPSULE ORAL at 09:01

## 2022-01-12 RX ADMIN — ENOXAPARIN SODIUM 40 MG: 40 INJECTION SUBCUTANEOUS at 09:01

## 2022-01-12 RX ADMIN — SODIUM CHLORIDE: 0.9 INJECTION, SOLUTION INTRAVENOUS at 02:01

## 2022-01-12 RX ADMIN — ISOSORBIDE MONONITRATE 30 MG: 30 TABLET, EXTENDED RELEASE ORAL at 09:01

## 2022-01-12 RX ADMIN — ASPIRIN 81 MG 81 MG: 81 TABLET ORAL at 09:01

## 2022-01-12 RX ADMIN — PANTOPRAZOLE SODIUM 40 MG: 40 TABLET, DELAYED RELEASE ORAL at 05:01

## 2022-01-12 RX ADMIN — IPRATROPIUM BROMIDE AND ALBUTEROL SULFATE 3 ML: .5; 3 SOLUTION RESPIRATORY (INHALATION) at 07:01

## 2022-01-12 RX ADMIN — IPRATROPIUM BROMIDE AND ALBUTEROL SULFATE 3 ML: .5; 3 SOLUTION RESPIRATORY (INHALATION) at 01:01

## 2022-01-12 RX ADMIN — LOSARTAN POTASSIUM 25 MG: 25 TABLET, FILM COATED ORAL at 09:01

## 2022-01-12 RX ADMIN — HYDROCODONE BITARTRATE AND ACETAMINOPHEN 1 TABLET: 10; 325 TABLET ORAL at 09:01

## 2022-01-12 RX ADMIN — FUROSEMIDE 40 MG: 40 TABLET ORAL at 09:01

## 2022-01-12 RX ADMIN — METOPROLOL SUCCINATE 25 MG: 25 TABLET, EXTENDED RELEASE ORAL at 09:01

## 2022-01-12 RX ADMIN — CYCLOBENZAPRINE 10 MG: 10 TABLET, FILM COATED ORAL at 09:01

## 2022-01-12 NOTE — PLAN OF CARE
01/11/22 2128   Patient Assessment/Suction   Level of Consciousness (AVPU) alert   Respiratory Effort Normal;Unlabored   Expansion/Accessory Muscles/Retractions no use of accessory muscles   All Lung Fields Breath Sounds Anterior:;clear   ТАТЬЯНА Breath Sounds clear   LLL Breath Sounds clear   RUL Breath Sounds clear   RML Breath Sounds clear   RLL Breath Sounds clear   Rhythm/Pattern, Respiratory unlabored   Cough Frequency infrequent   PRE-TX-O2   O2 Device (Oxygen Therapy) nasal cannula   $ Is the patient on Low Flow Oxygen? Yes   Flow (L/min) 3   SpO2 96 %   $ Pulse Oximetry - Multiple Charge Pulse Oximetry - Multiple   Pulse 80   Resp 18   Aerosol Therapy   $ Aerosol Therapy Charges Aerosol Treatment   Daily Review of Necessity (SVN) completed   Respiratory Treatment Status (SVN) given   Treatment Route (SVN) mask;oxygen   Patient Position (SVN) semi-Perry's   Post Treatment Assessment (SVN) breath sounds unchanged   Signs of Intolerance (SVN) none   Breath Sounds Post-Respiratory Treatment   Post-treatment Heart Rate (beats/min) 72   Post-treatment Resp Rate (breaths/min) 20   Preset CPAP/BiPAP Settings   Mode Of Delivery CPAP   $ Is patient using? No/refused   Reason patient is not wearing? Patient refused   Education   $ Education DME Oxygen;DME Nebulizer;15 min   Respiratory Evaluation   $ Care Plan Tech Time 15 min

## 2022-01-12 NOTE — RESPIRATORY THERAPY
01/12/22 1535   Home Oxygen Qualification   $ Home O2 Qualification Pulmonary Stress Test/6 min walk;Tech time 15 minutes   Room Air SpO2 At Rest 92 %   Room Air SpO2 During Ambulation (!) 85 %   SpO2 During Ambulation on O2 95 %   Heart Rate on O2 92 bpm   Ambulation O2 LPM 2 LPM   SpO2 Post Ambulation 95 %   Post Ambulation Heart Rate 92 bpm   Post Ambulation O2 LPM 2 LPM   Home O2 Eval Comments PATIENT DROPPED TO 85% WHILE WALKING ON ROOM AIR. PLACED 2L NC ON PATIENT. PATIENT SATS MAINTAINED AT 95% WHILE WALKING

## 2022-01-12 NOTE — PLAN OF CARE
01/12/22 0741   Patient Assessment/Suction   Level of Consciousness (AVPU) alert   Respiratory Effort Unlabored;Normal   Expansion/Accessory Muscles/Retractions no use of accessory muscles;no retractions   All Lung Fields Breath Sounds clear   PRE-TX-O2   O2 Device (Oxygen Therapy) nasal cannula   $ Is the patient on Low Flow Oxygen? Yes   Flow (L/min) 3   SpO2 95 %   Pulse Oximetry Type Continuous   Pulse 87   Resp 16   Aerosol Therapy   $ Aerosol Therapy Charges Aerosol Treatment   Daily Review of Necessity (SVN) completed   Respiratory Treatment Status (SVN) given   Treatment Route (SVN) mask;oxygen   Patient Position (SVN) semi-Perry's   Post Treatment Assessment (SVN) breath sounds unchanged   Signs of Intolerance (SVN) none   Breath Sounds Post-Respiratory Treatment   Post-treatment Heart Rate (beats/min) 74   Post-treatment Resp Rate (breaths/min) 18   Education   $ Education Bronchodilator;15 min   Respiratory Evaluation   $ Care Plan Tech Time 15 min   $ Eval/Re-eval Charges Re-evaluation

## 2022-01-12 NOTE — PLAN OF CARE
Problem: Adult Inpatient Plan of Care  Goal: Plan of Care Review  Outcome: Met  Goal: Patient-Specific Goal (Individualized)  Outcome: Met  Goal: Absence of Hospital-Acquired Illness or Injury  Outcome: Met  Goal: Optimal Comfort and Wellbeing  Outcome: Met  Goal: Readiness for Transition of Care  Outcome: Met     Problem: Bariatric Environmental Safety  Goal: Safety Maintained with Care  Outcome: Met     Problem: Fall Injury Risk  Goal: Absence of Fall and Fall-Related Injury  Outcome: Met     Problem: Chest Pain  Goal: Resolution of Chest Pain Symptoms  Outcome: Met

## 2022-01-12 NOTE — PLAN OF CARE
Problem: Adult Inpatient Plan of Care  Goal: Plan of Care Review  Outcome: Ongoing, Progressing  Goal: Patient-Specific Goal (Individualized)  Outcome: Ongoing, Progressing  Goal: Absence of Hospital-Acquired Illness or Injury  Outcome: Ongoing, Progressing  Goal: Optimal Comfort and Wellbeing  Outcome: Ongoing, Progressing  Goal: Readiness for Transition of Care  Outcome: Ongoing, Progressing     Problem: Bariatric Environmental Safety  Goal: Safety Maintained with Care  Outcome: Ongoing, Progressing     Problem: Fall Injury Risk  Goal: Absence of Fall and Fall-Related Injury  Outcome: Ongoing, Progressing     Problem: Chest Pain  Goal: Resolution of Chest Pain Symptoms  Outcome: Ongoing, Progressing

## 2022-01-12 NOTE — PLAN OF CARE
1604 Patient needs oxygen but COLIN is the dx and this is not a qualifying dx per Donn with [facility]. Dr Grant was informed of this; also patient has option to pay OOP. Dr Grant informed me that the patient will go the sleep study route and oxygen can be added at that time.

## 2022-01-13 NOTE — PLAN OF CARE
01/13/22 0935   Final Note   Assessment Type Final Discharge Note   Anticipated Discharge Disposition Home   What phone number can be called within the next 1-3 days to see how you are doing after discharge? [phone]   Post-Acute Status   Post-Acute Authorization HME   E Status   (Patient had order for home oxygen but did not have a qualifying dx for oxygen (COLIN). A sleep study was ordered and oxygen can hopefully be added with intevention for COLIN.)       Discharge orders reviewed. No case management/discharge planning needs noted.

## 2022-01-13 NOTE — HOSPITAL COURSE
Patient admitted with chest pain.  Per chart review, she was admitted November 2021 for the same. Myocardial perfusion scan showed a possible inferior area on LV of RI. Cardiac CTA was attempted but patient exceeded the weight limit. Cardiology recommended sending to main [place] for PET scan but it was determined that this is not done in the inpatient setting- only the outpatient setting.  Patient was discharged with recommended follow up. She tells me that her insurance denied the PET scan and she could not get a new patient Cardiology appointment until May and thus has not been able to follow up as of yet.  She was admitted and Cardiology consulted. Serial troponin trended and was normal.  Initial plan was to perform LHC.  Given issues in the heart center, her planned LHC had to be cancelled.  After discussion with Dr. Gaitan, given normal troponin and concern for degree of dye and radiation would be required due to habitus, recommendation was outpatient follow up with clinic if no current chest pain.  Patient with no present chest pain.  I obtained an apt given this was an issue with her prior inability to follow up- apt for 2/18 provided.  We discussed weight loss and the limits that her body weight was creating in her care. She is under evaluation for gastric sleeve at this time.  On discharge, I also placed a referral for repeat sleep study. She has known COLIN but tells me her CPAP was taken back given that she was not using it as required- she tells me this was complicated by Covid infection and not being able to use her cpap mask during covid infection. I feel that her untreated COLIN could be leading to symptoms including hypoxia with exertion.  Home O2 eval done and she has appropriate saturations at rest and upon initial exertion but respiratory reports she feel to 85% with prolonged exertion. CM consulted for home oxygen but she unfortunately does not have a diagnosis that will lend itself to insurance  coverage (COLIN or morbid obesity is not an approved diagnosis).  She does not have COPD or HF that would qualify her for coverage. I discussed this with her and I feel that appropriate treatment of her sleep apnea is likely the best course of action and thus sleep study referral placed as above and I discussed following back up with her PCP.  Advised against/cautioned against prolonged exertion and patient voiced understanding. Examined on day of discharge and alert, NAD, comfortable respirations.  Return precautions given.

## 2022-01-13 NOTE — DISCHARGE SUMMARY
Former[facility]  Discharge Summary      Patient Name: Maribel Hernandez  MRN: [id number]  Patient Class: OP- Observation  Admission Date: 1/11/2022  Hospital Length of Stay: 0 days  Discharge Date and Time: 1/12/2022  7:20 PM  Attending Physician: No att. providers found   Discharging Provider: Sabina Grant MD  Primary Care Provider: Graciela Mercer NP      HPI:   41 year old female presents with chest pain that started this morning while at rest. She reports pain is substernal, across her upper back and then down her left arm (tingling down her arm).  Pain has been waxing and waning but persistent.  Received NTG and greatly improved the pain and now she only feels a pressure.  She denies further episodes of pain since her November evaluation.  Per chart review, she was admitted November 2021. Myocardial perfusion scan showed a possible inferior area on LV of RI. Cardiac CTA was attempted but patient exceeded the weight limit. Cardiology recommended sending to [facility] for PET scan but it was determined that this is not done in the inpatient setting- only the outpatient setting.  Patient was discharged with recommended follow up. She tells me that her insurance denied the PET scan and she could not get a new patient Cardiology appointment until May and thus has not been able to follow up as of yet.  Also feels that her left leg is more swollen since yesterday.  Does not feel that her right leg is swollen. Denies NSAID use.  Denies pain is associated with food.  She was being treated for bronchitis back in November but denies any respiratory symptoms at this time.  First Troponin in the ED is normal. EKG with no ST elevation.      * No surgery found *      Hospital Course:   Patient admitted with chest pain.  Per chart review, she was admitted November 2021 for the same. Myocardial perfusion scan showed a possible inferior area on LV of RI. Cardiac CTA was attempted but  patient exceeded the weight limit. Cardiology recommended sending to [facility] for PET scan but it was determined that this is not done in the inpatient setting- only the outpatient setting.  Patient was discharged with recommended follow up. She tells me that her insurance denied the PET scan and she could not get a new patient Cardiology appointment until May and thus has not been able to follow up as of yet.  She was admitted and Cardiology consulted. Serial troponin trended and was normal.  Initial plan was to perform LHC.  Given issues in the heart center, her planned LHC had to be cancelled.  After discussion with Dr. Gaitan, given normal troponin and concern for degree of dye and radiation would be required due to habitus, recommendation was outpatient follow up with clinic if no current chest pain.  Patient with no present chest pain.  I obtained an apt given this was an issue with her prior inability to follow up- apt for 2/18 provided.  We discussed weight loss and the limits that her body weight was creating in her care. She is under evaluation for gastric sleeve at this time.  On discharge, I also placed a referral for repeat sleep study. She has known COLIN but tells me her CPAP was taken back given that she was not using it as required- she tells me this was complicated by Covid infection and not being able to use her cpap mask during covid infection. I feel that her untreated COLIN could be leading to symptoms including hypoxia with exertion.  Home O2 eval done and she has appropriate saturations at rest and upon initial exertion but respiratory reports she feel to 85% with prolonged exertion. CM consulted for home oxygen but she unfortunately does not have a diagnosis that will lend itself to insurance coverage (COLIN or morbid obesity is not an approved diagnosis).  She does not have COPD or HF that would qualify her for coverage. I discussed this with her and I feel that appropriate treatment of her  sleep apnea is likely the best course of action and thus sleep study referral placed as above and I discussed following back up with her PCP.  Advised against/cautioned against prolonged exertion and patient voiced understanding. Examined on day of discharge and alert, NAD, comfortable respirations.  Return precautions given.       Goals of Care Treatment Preferences:  Code Status: Full Code      Consults:   Consults (From admission, onward)        Status Ordering Provider     Inpatient consult to Cardiology  Once        Provider:  Eric Coy MD    Completed RALEIGH CHAVIRA          No new Assessment & Plan notes have been filed under this hospital service since the last note was generated.  Service: Hospital Medicine    Final Active Diagnoses:    Diagnosis Date Noted POA    Positive cardiac stress test [R94.39] 11/16/2021 Yes    Class 3 severe obesity due to excess calories with serious comorbidity and body mass index (BMI) of 60.0 to 69.9 in adult [E66.01, Z68.44] 09/07/2020 Not Applicable    Essential hypertension [I10] 09/07/2020 Yes    COLIN on CPAP [G47.33, Z99.89] 09/07/2020 Not Applicable      Problems Resolved During this Admission:    Diagnosis Date Noted Date Resolved POA    PRINCIPAL PROBLEM:  Chest pain [R07.9] 11/13/2021 01/12/2022 Yes       Discharged Condition: good    Disposition: Home or Self Care    Follow Up:   Follow-up Information     Graciela Mercer NP. Schedule an appointment as soon as possible for a visit in 2 weeks.    Specialty: Family Medicine  Why: post hospital discharge follow up for chest pain and untreated COLIN  Contact information:  [address] [facility] [address]  [phone]             Vincent Gaitan MD.    Specialties: INTERVENTIONAL CARDIOLOGY, Cardiology  Why: Appointment scheduled for 2/18/21 at 240pm.  Please call if needs to be rescheduled.  Contact information:  [address] [facility] [address]  [place]  "[id number]  [phone]                       Patient Instructions:      OXYGEN FOR HOME USE     Order Specific Question Answer Comments   Liter Flow 2    Duration With activity    Qualifying Test Performed at: Activity    Oxygen saturation at rest 92    Oxygen saturation with activity 85    Oxygen saturation with activity on oxygen 92    Portable mode: continuous    Route nasal cannula    Device: home concentrator with portable tanks    Length of need (in months): 99 mos    Patient condition with qualifying saturation Other - List qualifying diagnosis and code    Select a diagnosis & list the code in the comments COLIN (obstructive sleep apnea) [190372]    Height: 5' 7" (1.702 m)    Weight: 201.6 kg (444 lb 7.2 oz)    Alternative treatment measures have been tried or considered and deemed clinically ineffective. Yes      Diet diabetic     Diet Cardiac     Notify your health care provider if you experience any of the following:  severe uncontrolled pain     Notify your health care provider if you experience any of the following:  difficulty breathing or increased cough     Polysomnogram (CPAP will be added if patient meets diagnostic criteria.)   Standing Status: Future Standing Exp. Date: 01/12/23   Order Comments: ASAP.  Patient with prior CPAP for COLIN that was taken back given not used for the appropriate amount of time and needs to re certify.     Activity as tolerated       Significant Diagnostic Studies: Labs:   CMP   Recent Labs   Lab 01/11/22  0730 01/12/22  0557    139   K 3.9 4.1    100   CO2 29 30*   * 140*   BUN 12 12   CREATININE 0.9 1.0   CALCIUM 8.4* 8.3*   PROT 7.4 7.5   ALBUMIN 3.4* 3.6   BILITOT 1.4* 1.8*   ALKPHOS 133 126   AST 24 20   ALT 29 31   ANIONGAP 4* 9   ESTGFRAFRICA >60.0 >60.0   EGFRNONAA >60.0 >60.0    and CBC   Recent Labs   Lab 01/11/22  0730 01/11/22  0730 01/12/22  0557   WBC 8.30  8.30  --  10.08   HGB 13.6  13.6  --  12.6   HCT 44.0  44.0   < > 42.6     " 317  --  354    < > = values in this interval not displayed.       Pending Diagnostic Studies:     Procedure Component Value Units Date/Time    EKG 12-LEAD [[id number]] Collected: 01/12/22 0831    Order Status: Sent Lab Status: In process Updated: 01/12/22 0912    Narrative:      Test Reason : R07.9,    Vent. Rate : 079 BPM     Atrial Rate : 079 BPM     P-R Int : 166 ms          QRS Dur : 098 ms      QT Int : 400 ms       P-R-T Axes : 064 097 028 degrees     QTc Int : 458 ms    Normal sinus rhythm  Rightward axis  Anterior infarct ,age undetermined  Abnormal ECG  When compared with ECG of 11-JAN-2022 06:45,  No significant change was found    Referred By: [email or web address]   SELF           Confirmed By:          Medications:  Reconciled Home Medications:      Medication List      CHANGE how you take these medications    diltiaZEM 360 MG Cs24  Commonly known as: TIAZAC  Take 360 mg by mouth once daily.  What changed: Another medication with the same name was removed. Continue taking this medication, and follow the directions you see here.        CONTINUE taking these medications    albuterol 90 mcg/actuation inhaler  Commonly known as: VENTOLIN HFA  Inhale 2 puffs into the lungs every 4 (four) hours as needed for Wheezing or Shortness of Breath. Rescue     albuterol-ipratropium 2.5 mg-0.5 mg/3 mL nebulizer solution  Commonly known as: DUO-NEB  Take 3 mLs by nebulization every 6 (six) hours while awake.     ALPRAZolam 2 MG Tab  Commonly known as: XANAX  Take 2 mg by mouth 3 (three) times daily as needed (anxiety).     aspirin 81 MG Chew  Take 1 tablet (81 mg total) by mouth once daily.     atorvastatin 80 MG tablet  Commonly known as: LIPITOR  Take 1 tablet (80 mg total) by mouth every evening.     cloNIDine 0.2 mg/24 hr td ptwk 0.2 mg/24 hr  Commonly known as: CATAPRES  Place 1 patch onto the skin every 7 days.     cyclobenzaprine 10 MG tablet  Commonly known as: FLEXERIL  Take 1 tablet by mouth 2 (two) times a day.      ergocalciferol 50,000 unit Cap  Commonly known as: ERGOCALCIFEROL  Take 50,000 Units by mouth every 7 days.     FLUoxetine 40 MG capsule  Take 40 mg by mouth once daily.     furosemide 40 MG tablet  Commonly known as: LASIX  Take 40 mg by mouth 2 (two) times daily.     HYDROcodone-acetaminophen  mg per tablet  Commonly known as: NORCO  Take 1 tablet by mouth every 8 (eight) hours as needed for Pain.     isosorbide mononitrate 30 MG 24 hr tablet  Commonly known as: IMDUR  Take 1 tablet (30 mg total) by mouth once daily.     losartan 25 MG tablet  Commonly known as: COZAAR  Take 1 tablet (25 mg total) by mouth once daily.     metoprolol succinate 25 MG 24 hr tablet  Commonly known as: TOPROL-XL  Take 25 mg by mouth once daily.     multivitamin Tab  Take 1 tablet by mouth once daily.     pantoprazole 40 MG tablet  Commonly known as: PROTONIX  Take 1 tablet (40 mg total) by mouth once daily.     zolpidem 10 mg Tab  Commonly known as: AMBIEN  Take 1 tablet by mouth every evening.        STOP taking these medications    ascorbic acid (vitamin C) 500 MG tablet  Commonly known as: VITAMIN C     metFORMIN 500 MG tablet  Commonly known as: GLUCOPHAGE     metoprolol tartrate 50 MG tablet  Commonly known as: LOPRESSOR     spironolactone 50 MG tablet  Commonly known as: ALDACTONE     vitamin D 1000 units Tab  Commonly known as: VITAMIN D3            Indwelling Lines/Drains at time of discharge:   Lines/Drains/Airways     None                 Time spent on the discharge of patient: 35 minutes         Sabina Grant MD  Department of Hospital Medicine  [facility]

## 2022-02-02 ENCOUNTER — HOSPITAL ENCOUNTER (INPATIENT)
Facility: HOSPITAL | Age: 42
LOS: 3 days | Discharge: ANOTHER HOSPITAL | DRG: 152 | End: 2022-02-05
Attending: EMERGENCY MEDICINE | Admitting: INTERNAL MEDICINE
Payer: MEDICAID

## 2022-02-02 DIAGNOSIS — J02.9 PHARYNGITIS, UNSPECIFIED ETIOLOGY: ICD-10-CM

## 2022-02-02 DIAGNOSIS — J39.2 OROPHARYNGEAL LESION: Primary | ICD-10-CM

## 2022-02-02 DIAGNOSIS — R07.9 CHEST PAIN: ICD-10-CM

## 2022-02-02 LAB
ALBUMIN SERPL BCP-MCNC: 3.8 G/DL (ref 3.5–5.2)
ALP SERPL-CCNC: 132 U/L (ref 55–135)
ALT SERPL W/O P-5'-P-CCNC: 36 U/L (ref 10–44)
ANION GAP SERPL CALC-SCNC: 11 MMOL/L (ref 8–16)
AST SERPL-CCNC: 29 U/L (ref 10–40)
BASOPHILS # BLD AUTO: 0.03 K/UL (ref 0–0.2)
BASOPHILS NFR BLD: 0.2 % (ref 0–1.9)
BILIRUB SERPL-MCNC: 2.2 MG/DL (ref 0.1–1)
BNP SERPL-MCNC: 48 PG/ML (ref 0–99)
BUN SERPL-MCNC: 8 MG/DL (ref 6–20)
CALCIUM SERPL-MCNC: 8.9 MG/DL (ref 8.7–10.5)
CHLORIDE SERPL-SCNC: 97 MMOL/L (ref 95–110)
CO2 SERPL-SCNC: 27 MMOL/L (ref 23–29)
CREAT SERPL-MCNC: 0.7 MG/DL (ref 0.5–1.4)
D DIMER PPP IA.FEU-MCNC: 0.48 UG/ML FEU
DIFFERENTIAL METHOD: ABNORMAL
EOSINOPHIL # BLD AUTO: 0.1 K/UL (ref 0–0.5)
EOSINOPHIL NFR BLD: 0.8 % (ref 0–8)
ERYTHROCYTE [DISTWIDTH] IN BLOOD BY AUTOMATED COUNT: 13.2 % (ref 11.5–14.5)
EST. GFR  (AFRICAN AMERICAN): >60 ML/MIN/1.73 M^2
EST. GFR  (NON AFRICAN AMERICAN): >60 ML/MIN/1.73 M^2
GLUCOSE SERPL-MCNC: 121 MG/DL (ref 70–110)
GROUP A STREP, MOLECULAR: NEGATIVE
HCT VFR BLD AUTO: 42.6 % (ref 37–48.5)
HGB BLD-MCNC: 13.3 G/DL (ref 12–16)
IMM GRANULOCYTES # BLD AUTO: 0.06 K/UL (ref 0–0.04)
IMM GRANULOCYTES NFR BLD AUTO: 0.5 % (ref 0–0.5)
LYMPHOCYTES # BLD AUTO: 1.6 K/UL (ref 1–4.8)
LYMPHOCYTES NFR BLD: 13.3 % (ref 18–48)
MCH RBC QN AUTO: 27.7 PG (ref 27–31)
MCHC RBC AUTO-ENTMCNC: 31.2 G/DL (ref 32–36)
MCV RBC AUTO: 89 FL (ref 82–98)
MONOCYTES # BLD AUTO: 0.6 K/UL (ref 0.3–1)
MONOCYTES NFR BLD: 5.3 % (ref 4–15)
NEUTROPHILS # BLD AUTO: 9.7 K/UL (ref 1.8–7.7)
NEUTROPHILS NFR BLD: 79.9 % (ref 38–73)
NRBC BLD-RTO: 0 /100 WBC
PLATELET # BLD AUTO: 342 K/UL (ref 150–450)
PMV BLD AUTO: 10.2 FL (ref 9.2–12.9)
POTASSIUM SERPL-SCNC: 4.2 MMOL/L (ref 3.5–5.1)
PROT SERPL-MCNC: 8.2 G/DL (ref 6–8.4)
RBC # BLD AUTO: 4.81 M/UL (ref 4–5.4)
SARS-COV-2 RDRP RESP QL NAA+PROBE: NEGATIVE
SODIUM SERPL-SCNC: 135 MMOL/L (ref 136–145)
TROPONIN I SERPL DL<=0.01 NG/ML-MCNC: <0.03 NG/ML
TROPONIN I SERPL DL<=0.01 NG/ML-MCNC: <0.03 NG/ML
TSH SERPL DL<=0.005 MIU/L-ACNC: 1.29 UIU/ML (ref 0.34–5.6)
WBC # BLD AUTO: 12.15 K/UL (ref 3.9–12.7)

## 2022-02-02 PROCEDURE — 84484 ASSAY OF TROPONIN QUANT: CPT | Performed by: NURSE PRACTITIONER

## 2022-02-02 PROCEDURE — 96376 TX/PRO/DX INJ SAME DRUG ADON: CPT

## 2022-02-02 PROCEDURE — 84443 ASSAY THYROID STIM HORMONE: CPT | Performed by: EMERGENCY MEDICINE

## 2022-02-02 PROCEDURE — 83880 ASSAY OF NATRIURETIC PEPTIDE: CPT | Performed by: NURSE PRACTITIONER

## 2022-02-02 PROCEDURE — U0002 COVID-19 LAB TEST NON-CDC: HCPCS | Performed by: EMERGENCY MEDICINE

## 2022-02-02 PROCEDURE — 87651 STREP A DNA AMP PROBE: CPT | Performed by: EMERGENCY MEDICINE

## 2022-02-02 PROCEDURE — 99285 EMERGENCY DEPT VISIT HI MDM: CPT | Mod: 25

## 2022-02-02 PROCEDURE — 25000003 PHARM REV CODE 250: Performed by: EMERGENCY MEDICINE

## 2022-02-02 PROCEDURE — 93010 ELECTROCARDIOGRAM REPORT: CPT | Mod: ,,, | Performed by: INTERNAL MEDICINE

## 2022-02-02 PROCEDURE — 85379 FIBRIN DEGRADATION QUANT: CPT | Performed by: EMERGENCY MEDICINE

## 2022-02-02 PROCEDURE — 25500020 PHARM REV CODE 255: Performed by: EMERGENCY MEDICINE

## 2022-02-02 PROCEDURE — 20000000 HC ICU ROOM

## 2022-02-02 PROCEDURE — 85025 COMPLETE CBC W/AUTO DIFF WBC: CPT | Performed by: NURSE PRACTITIONER

## 2022-02-02 PROCEDURE — 25000003 PHARM REV CODE 250: Performed by: NURSE PRACTITIONER

## 2022-02-02 PROCEDURE — 80053 COMPREHEN METABOLIC PANEL: CPT | Performed by: NURSE PRACTITIONER

## 2022-02-02 PROCEDURE — 93010 EKG 12-LEAD: ICD-10-PCS | Mod: ,,, | Performed by: INTERNAL MEDICINE

## 2022-02-02 PROCEDURE — 93005 ELECTROCARDIOGRAM TRACING: CPT | Performed by: INTERNAL MEDICINE

## 2022-02-02 PROCEDURE — 96365 THER/PROPH/DIAG IV INF INIT: CPT

## 2022-02-02 RX ORDER — NAPROXEN SODIUM 220 MG/1
243 TABLET, FILM COATED ORAL ONCE
Status: COMPLETED | OUTPATIENT
Start: 2022-02-02 | End: 2022-02-02

## 2022-02-02 RX ORDER — METOPROLOL TARTRATE 25 MG/1
25 TABLET, FILM COATED ORAL
COMMUNITY
End: 2022-02-05

## 2022-02-02 RX ORDER — GABAPENTIN 300 MG/1
300 CAPSULE ORAL 2 TIMES DAILY
COMMUNITY
Start: 2021-09-13

## 2022-02-02 RX ORDER — MORPHINE SULFATE 2 MG/ML
2 INJECTION, SOLUTION INTRAMUSCULAR; INTRAVENOUS
Status: COMPLETED | OUTPATIENT
Start: 2022-02-03 | End: 2022-02-03

## 2022-02-02 RX ORDER — ESTRADIOL 0.1 MG/G
CREAM VAGINAL
COMMUNITY
Start: 2021-10-25 | End: 2022-02-05

## 2022-02-02 RX ORDER — CEPHALEXIN 250 MG/1
1 CAPSULE ORAL
COMMUNITY
Start: 2021-12-10

## 2022-02-02 RX ORDER — CLINDAMYCIN PHOSPHATE 900 MG/50ML
900 INJECTION, SOLUTION INTRAVENOUS
Status: COMPLETED | OUTPATIENT
Start: 2022-02-02 | End: 2022-02-02

## 2022-02-02 RX ORDER — METFORMIN HYDROCHLORIDE 500 MG/1
500 TABLET ORAL 2 TIMES DAILY
COMMUNITY
Start: 2022-01-28 | End: 2022-02-05

## 2022-02-02 RX ORDER — SPIRONOLACTONE 50 MG/1
50 TABLET, FILM COATED ORAL DAILY
COMMUNITY
Start: 2022-01-28

## 2022-02-02 RX ADMIN — IOHEXOL 100 ML: 350 INJECTION, SOLUTION INTRAVENOUS at 08:02

## 2022-02-02 RX ADMIN — ASPIRIN 81 MG 243 MG: 81 TABLET ORAL at 07:02

## 2022-02-02 RX ADMIN — CLINDAMYCIN IN 5 PERCENT DEXTROSE 900 MG: 18 INJECTION, SOLUTION INTRAVENOUS at 10:02

## 2022-02-03 LAB — LACTATE SERPL-SCNC: 0.8 MMOL/L (ref 0.5–1.9)

## 2022-02-03 PROCEDURE — 96367 TX/PROPH/DG ADDL SEQ IV INF: CPT

## 2022-02-03 PROCEDURE — 63600175 PHARM REV CODE 636 W HCPCS: Performed by: EMERGENCY MEDICINE

## 2022-02-03 PROCEDURE — 96366 THER/PROPH/DIAG IV INF ADDON: CPT

## 2022-02-03 PROCEDURE — 83605 ASSAY OF LACTIC ACID: CPT | Performed by: EMERGENCY MEDICINE

## 2022-02-03 PROCEDURE — 87040 BLOOD CULTURE FOR BACTERIA: CPT | Performed by: EMERGENCY MEDICINE

## 2022-02-03 PROCEDURE — 25000003 PHARM REV CODE 250: Performed by: EMERGENCY MEDICINE

## 2022-02-03 PROCEDURE — 20000000 HC ICU ROOM

## 2022-02-03 PROCEDURE — 96375 TX/PRO/DX INJ NEW DRUG ADDON: CPT

## 2022-02-03 RX ORDER — METHYLPREDNISOLONE SOD SUCC 125 MG
125 VIAL (EA) INJECTION
Status: COMPLETED | OUTPATIENT
Start: 2022-02-03 | End: 2022-02-03

## 2022-02-03 RX ORDER — ATORVASTATIN CALCIUM 20 MG/1
80 TABLET, FILM COATED ORAL DAILY
Status: DISCONTINUED | OUTPATIENT
Start: 2022-02-04 | End: 2022-02-03

## 2022-02-03 RX ORDER — VANCOMYCIN HCL IN 5 % DEXTROSE 1G/250ML
1000 PLASTIC BAG, INJECTION (ML) INTRAVENOUS ONCE
Status: DISCONTINUED | OUTPATIENT
Start: 2022-02-03 | End: 2022-02-05

## 2022-02-03 RX ORDER — FLUOXETINE HYDROCHLORIDE 20 MG/1
40 CAPSULE ORAL DAILY
Status: DISCONTINUED | OUTPATIENT
Start: 2022-02-04 | End: 2022-02-05 | Stop reason: HOSPADM

## 2022-02-03 RX ORDER — GABAPENTIN 300 MG/1
300 CAPSULE ORAL 2 TIMES DAILY
Status: DISCONTINUED | OUTPATIENT
Start: 2022-02-03 | End: 2022-02-05 | Stop reason: HOSPADM

## 2022-02-03 RX ORDER — ACETAMINOPHEN 650 MG/20.3ML
1000 LIQUID ORAL
Status: COMPLETED | OUTPATIENT
Start: 2022-02-03 | End: 2022-02-03

## 2022-02-03 RX ORDER — VANCOMYCIN HCL IN 5 % DEXTROSE 1G/250ML
1000 PLASTIC BAG, INJECTION (ML) INTRAVENOUS ONCE
Status: COMPLETED | OUTPATIENT
Start: 2022-02-03 | End: 2022-02-03

## 2022-02-03 RX ORDER — FUROSEMIDE 40 MG/1
40 TABLET ORAL DAILY
Status: DISCONTINUED | OUTPATIENT
Start: 2022-02-04 | End: 2022-02-05 | Stop reason: HOSPADM

## 2022-02-03 RX ORDER — SPIRONOLACTONE 25 MG/1
50 TABLET ORAL DAILY
Status: DISCONTINUED | OUTPATIENT
Start: 2022-02-04 | End: 2022-02-05 | Stop reason: HOSPADM

## 2022-02-03 RX ORDER — ONDANSETRON 2 MG/ML
4 INJECTION INTRAMUSCULAR; INTRAVENOUS
Status: COMPLETED | OUTPATIENT
Start: 2022-02-03 | End: 2022-02-03

## 2022-02-03 RX ORDER — ACETAMINOPHEN 500 MG
1000 TABLET ORAL
Status: DISCONTINUED | OUTPATIENT
Start: 2022-02-03 | End: 2022-02-03

## 2022-02-03 RX ADMIN — PIPERACILLIN AND TAZOBACTAM 4.5 G: 4; .5 INJECTION, POWDER, LYOPHILIZED, FOR SOLUTION INTRAVENOUS; PARENTERAL at 03:02

## 2022-02-03 RX ADMIN — PIPERACILLIN AND TAZOBACTAM 4.5 G: 4; .5 INJECTION, POWDER, LYOPHILIZED, FOR SOLUTION INTRAVENOUS; PARENTERAL at 11:02

## 2022-02-03 RX ADMIN — VANCOMYCIN HYDROCHLORIDE 1000 MG: 1 INJECTION, POWDER, LYOPHILIZED, FOR SOLUTION INTRAVENOUS at 08:02

## 2022-02-03 RX ADMIN — MORPHINE SULFATE 2 MG: 2 INJECTION, SOLUTION INTRAMUSCULAR; INTRAVENOUS at 12:02

## 2022-02-03 RX ADMIN — ONDANSETRON 4 MG: 2 INJECTION INTRAMUSCULAR; INTRAVENOUS at 12:02

## 2022-02-03 RX ADMIN — METHYLPREDNISOLONE SODIUM SUCCINATE 125 MG: 125 INJECTION, POWDER, FOR SOLUTION INTRAMUSCULAR; INTRAVENOUS at 02:02

## 2022-02-03 RX ADMIN — ACETAMINOPHEN 999.01 MG: 650 SOLUTION ORAL at 08:02

## 2022-02-03 RX ADMIN — GABAPENTIN 300 MG: 300 CAPSULE ORAL at 08:02

## 2022-02-03 NOTE — ED PROVIDER NOTES
Encounter Date: 2/2/2022       History     Chief Complaint   Patient presents with    Chest Pain     Right sided started today. Tightness radiating down from throat    sore throat     Hard to swallow     41-year-old female presents complaining of right-sided chest pain which is described as a tightness, patient also reports sore throat for the past 2-3 days patient reports a feeling of difficulty swallowing she also reports tenderness to palpation of her anterior neck, she reports chest discomfort started today but reports she feels that it is related to her neck pain which developed 1st, patient has a history of hypertension hyperlipidemia morbid obesity and anxiety patient also reports that she had a heart attack in November but she reports she has no stents placed.  Patient has no other acute complaints at this time patient denies shortness of breath patient denies stridor.        Review of patient's allergies indicates:  No Known Allergies  Past Medical History:   Diagnosis Date    Anxiety     COVID-19     Depression     Heart attack     Hyperlipidemia     Hypertension     Obesity     Obesity      Past Surgical History:   Procedure Laterality Date    CHOLECYSTECTOMY      HYSTERECTOMY       Family History   Problem Relation Age of Onset    No Known Problems Mother     No Known Problems Father      Social History     Tobacco Use    Smoking status: Never Smoker   Substance Use Topics    Alcohol use: No     Review of Systems   Constitutional: Negative for fever.   HENT: Positive for sore throat and trouble swallowing. Negative for congestion and rhinorrhea.    Eyes: Negative for visual disturbance.   Respiratory: Negative for cough, chest tightness, shortness of breath and wheezing.    Cardiovascular: Positive for chest pain. Negative for palpitations and leg swelling.   Gastrointestinal: Negative for abdominal distention, abdominal pain, constipation, diarrhea, nausea and vomiting.   Genitourinary:  Negative for difficulty urinating, dysuria, flank pain and frequency.   Musculoskeletal: Negative for arthralgias, back pain, joint swelling and neck pain.   Skin: Negative for color change and rash.   Neurological: Negative for dizziness, syncope, speech difficulty, weakness, numbness and headaches.   All other systems reviewed and are negative.      Physical Exam     Initial Vitals [02/02/22 1908]   BP Pulse Resp Temp SpO2   (!) 202/105 95 20 98.5 °F (36.9 °C) 95 %      MAP       --         Physical Exam    Nursing note and vitals reviewed.  Constitutional: She appears well-developed and well-nourished. She is not diaphoretic. No distress.   HENT:   Head: Normocephalic and atraumatic.   Right Ear: External ear normal.   Left Ear: External ear normal.   Nose: Nose normal.   Mouth/Throat: No oropharyngeal exudate.   Patient has oropharyngeal erythema however back of throat is symmetrical   Eyes: Conjunctivae and EOM are normal. Pupils are equal, round, and reactive to light. Right eye exhibits no discharge. Left eye exhibits no discharge. No scleral icterus.   Neck: Neck supple. No thyromegaly present. No tracheal deviation present. No JVD present.   Normal range of motion.  Cardiovascular: Normal rate, regular rhythm, normal heart sounds and intact distal pulses. Exam reveals no gallop and no friction rub.    No murmur heard.  Pulmonary/Chest: Breath sounds normal. No stridor. No respiratory distress. She has no wheezes. She has no rhonchi. She has no rales. She exhibits no tenderness.   Abdominal: Abdomen is soft. Bowel sounds are normal. She exhibits no distension and no mass. There is no abdominal tenderness. There is no rebound and no guarding.   Musculoskeletal:         General: No tenderness or edema. Normal range of motion.      Cervical back: Normal range of motion and neck supple.     Lymphadenopathy:     She has no cervical adenopathy.   Neurological: She is alert and oriented to person, place, and time.  She has normal strength. No cranial nerve deficit or sensory deficit.   Skin: Skin is warm and dry. No rash and no abscess noted. No erythema. No pallor.         ED Course   Procedures  Labs Reviewed   CBC W/ AUTO DIFFERENTIAL - Abnormal; Notable for the following components:       Result Value    MCHC 31.2 (*)     Gran # (ANC) 9.7 (*)     Immature Grans (Abs) 0.06 (*)     Gran % 79.9 (*)     Lymph % 13.3 (*)     All other components within normal limits   COMPREHENSIVE METABOLIC PANEL - Abnormal; Notable for the following components:    Sodium 135 (*)     Glucose 121 (*)     Total Bilirubin 2.2 (*)     All other components within normal limits   GROUP A STREP, MOLECULAR   CULTURE, BLOOD   CULTURE, BLOOD   THROAT SCREEN, RAPID STREP   TROPONIN I   B-TYPE NATRIURETIC PEPTIDE   SARS-COV-2 RNA AMPLIFICATION, QUAL   D DIMER, QUANTITATIVE   TSH   TROPONIN I   LACTIC ACID, PLASMA          Imaging Results          CT Soft Tissue Neck With Contrast (Final result)  Result time 02/02/22 21:38:51    Final result by Kain Taylor DO (02/02/22 21:38:51)                 Narrative:    CLINICAL HISTORY: Anterior neck pain and odynophagia    COMPARISON: Chest CT 9/8/2020    TECHNIQUE: A CT scan of the neck was performed after the intravenous administration of 100 ml of Omnipaque 350. Coronal and sagittal reformatted images were obtained. This exam was performed according to our departmental dose-optimization program, which includes automated exposure control, adjustment of the mA and/or kV according to patient size, and/or use of iterative reconstruction technique.    FINDINGS:  There is extensive retropharyngeal edema without an enhancing rim extending from the level of the dens to C4. There are mildly prominent retropharyngeal lymph nodes at the superior margin measuring up to 5 mm. There is moderate narrowing of the oropharynx.    The nasopharynx, epiglottis, aryepiglottic folds, and larynx are otherwise intact, symmetric, and  unremarkable. The parotid, submandibular, and thyroid glands are unremarkable. The major vascular structures within the neck are relatively normal in caliber. There is reversal of the normal cervical lordosis which may be secondary to patient positioning or muscle spasm. Mild degenerative changes of the cervical spine. The visualized portions of the lung apices are clear.    IMPRESSION:  1. Marked retropharyngeal edema suggestive of cellulitis/phlegmon without evidence of abscess. There is associated moderate narrowing of the oropharynx.    2. Mildly prominent retropharyngeal lymph nodes, likely reactive.    Electronically signed by:  Kain Taylor DO  2/2/2022 9:38 PM CST Workstation: [id number]                             X-Ray Chest AP Portable (Final result)  Result time 02/02/22 19:46:10    Final result by Beto Osborn MD (02/02/22 19:46:10)                 Narrative:    Chest single view    CLINICAL DATA: Chest pain    FINDINGS: AP view is significantly limited by body habitus. Heart size is upper normal. The mediastinum is unremarkable. No infiltrates or effusions are identified. No acute osseous abnormality is demonstrated.    IMPRESSION:  1. Limited examination demonstrating no acute process.    Electronically signed by:  Beto Osborn MD  2/2/2022 7:46 PM CST Workstation: [id number]                               Medications   piperacillin-tazobactam 4.5 g in dextrose 5 % 100 mL IVPB (ready to mix system) (4.5 g Intravenous New Bag 2/3/22 1501)   aspirin chewable tablet 243 mg (243 mg Oral Given 2/2/22 1928)   iohexoL (OMNIPAQUE 350) injection 100 mL (100 mLs Intravenous Given 2/2/22 2048)   clindamycin in D5W 900 mg/50 mL IVPB 900 mg (0 mg Intravenous Stopped 2/2/22 2334)   morphine injection 2 mg (2 mg Intravenous Given 2/3/22 0008)   ondansetron injection 4 mg (4 mg Intravenous Given 2/3/22 0008)   methylPREDNISolone sodium succinate injection 125 mg (125 mg Intravenous Given 2/3/22 0215)    acetaminophen oral solution 999.0148 mg (999.0148 mg Oral Given 2/3/22 6629)     Medical Decision Making:   History:   Old Medical Records: I decided to obtain old medical records.  Initial Assessment:   Emergent evaluation of a 41-year-old female presenting with chest pain and sore throat differential diagnosis includes pharyngitis, ACS, electrolyte abnormality, endocrine dysfunction, infection.            Attending Attestation:             Attending ED Notes:   Patient's cardiac enzymes within normal limits, patient's chest pain has resolved, patient continues to experience neck tenderness and reports that she feels like there is swelling in the back of her throat and she has difficulty swallowing.  CT evaluation of soft tissue neck reveals moderate swelling and inflammation consistent with retropharyngeal phlegmon/cellulitis there is no sign of abscess.  Patient also has moderate narrowing of her airway, patient started on a course of IV antibiotics and will be consulted to ENT for admission.    Discussed case with Dr. Cheung of ENT at [facility], he is accepted transfer, currently there are no beds available at [facility], patient to continue antibiotic treatment and monitoring until bed becomes available.    Patient turned over to Dr Delgado at 3am.      Addendum:  Still pending transfer to [facility], patient is stable, patient has antibiotics scheduled and has diet ordered, patient being given her home medications.  Patient still awaiting transfer.  Have contacted [facility] several times throughout the day and hospital remains on diversion.  Transfer center is additionally checking other surrounding facilities to see if there is any option to transfer patient to another facility.  Will continue to monitor.                 Clinical Impression:   Final diagnoses:  [R07.9] Chest pain  [J02.9] Pharyngitis, unspecified etiology  [J39.2] Oropharyngeal lesion (Primary)          ED Disposition Condition    Transfer to Another Facility Stable               Genaro Whitehead MD  02/03/22 1712       Genaro Whitehead MD  02/04/22 0050

## 2022-02-03 NOTE — PROVIDER TRANSFER
(Physician in Lead of Transfers)   Outside Transfer Acceptance Note / Regional Referral Center    Referring facility: [facility]   Referring provider: CHUY WELLS ANDREW  Accepting facility: [facility]  Accepting provider: ELISA BRICEÑO  Reason for transfer:  Higher level of care  Transfer diagnosis: retropharyngeal phlegmon/cellulitis   Transfer specialty requested: Cardiology  Ent-Otolaryngology  Transfer specialty notified: Yes  Transfer level: 2  Isolation status: No active isolations   Admission class or status: IP- Inpatient  IP- Inpatient      Narrative     Patient is a 41 y.o. female who has a past medical history of Anxiety, COVID-19, Depression, Heart attack, Hyperlipidemia, Hypertension, Obesity, and Obesity presented to [facility] with complaints of sore throat and difficulty swallowing. Patient also reports  tenderness to palpation of her anterior neck. CT evaluation of soft tissue neck reveals moderate swelling and inflammation consistent with retropharyngeal phlegmon/cellulitis there is no sign of abscess. See below labs. Patient does not have any stridor and is tolerating secretions. No signs of respiratory distress. She is not hypoxic. Patient treated with IV zosyn and solumedrol. Facility seeking transfer for higher level of care and ENT consult. Stable for transfer.      Objective     Vitals: Temp: 98.5 °F (36.9 °C) (02/02/22 1908)  Pulse: 99 (02/03/22 0141)  Resp: (!) 26 (02/03/22 0141)  BP: 135/73 (02/02/22 2238)  SpO2: 95 % (02/03/22 0141)    Recent Labs: see EPIC  CBC:  Recent Labs   Lab 02/02/22 1929   WBC 12.15   HGB 13.3   HCT 42.6        CMP:  Recent Labs   Lab 02/02/22 1929   CALCIUM 8.9   ALBUMIN 3.8   PROT 8.2   *   K 4.2   CO2 27   CL 97   BUN 8   CREATININE 0.7   ALKPHOS 132   ALT 36   AST 29   BILITOT 2.2*     No results for input(s): LACTATE in  the last 72 hours.  Recent Labs   Lab 02/02/22 2238   TROPONINI <0.030     BNP  Recent Labs   Lab 02/02/22  1929   BNP 48     ABG  No results for input(s): PH, PO2, PCO2, HCO3, BE in the last 168 hours.   Lab Results   Component Value Date    INR 1.1 01/11/2022    INR 1.1 11/13/2021       Recent imaging:   CT Soft Tissue Neck With Contrast  CLINICAL HISTORY: Anterior neck pain and odynophagia    COMPARISON: Chest CT 9/8/2020    TECHNIQUE: A CT scan of the neck was performed after the intravenous administration of 100 ml of Omnipaque 350. Coronal and sagittal reformatted images were obtained. This exam was performed according to our departmental dose-optimization program, which includes automated exposure control, adjustment of the mA and/or kV according to patient size, and/or use of iterative reconstruction technique.    FINDINGS:  There is extensive retropharyngeal edema without an enhancing rim extending from the level of the dens to C4. There are mildly prominent retropharyngeal lymph nodes at the superior margin measuring up to 5 mm. There is moderate narrowing of the oropharynx.    The nasopharynx, epiglottis, aryepiglottic folds, and larynx are otherwise intact, symmetric, and unremarkable. The parotid, submandibular, and thyroid glands are unremarkable. The major vascular structures within the neck are relatively normal in caliber. There is reversal of the normal cervical lordosis which may be secondary to patient positioning or muscle spasm. Mild degenerative changes of the cervical spine. The visualized portions of the lung apices are clear.    IMPRESSION:  1. Marked retropharyngeal edema suggestive of cellulitis/phlegmon without evidence of abscess. There is associated moderate narrowing of the oropharynx.    2. Mildly prominent retropharyngeal lymph nodes, likely reactive.    Electronically signed by:  Kain Taylor DO  2/2/2022 9:38 PM CST Workstation: [id number]  X-Ray Chest AP Portable  Chest single  view    CLINICAL DATA: Chest pain    FINDINGS: AP view is significantly limited by body habitus. Heart size is upper normal. The mediastinum is unremarkable. No infiltrates or effusions are identified. No acute osseous abnormality is demonstrated.    IMPRESSION:  1. Limited examination demonstrating no acute process.    Electronically signed by:  Beto Osborn MD  2/2/2022 7:46 PM CST Workstation: [id number]      Airway:     Vent settings:     IV access:        Peripheral IV - Single Lumen 02/02/22 1929 20 G Right Antecubital (Active)   Site Assessment Clean;Dry;Intact 02/02/22 1929   Line Status Blood return noted 02/02/22 1929   Dressing Intervention First dressing 02/02/22 1929     Allergies: Review of patient's allergies indicates:  No Known Allergies   NPO: Yes      Anticoagulation:   Anticoagulants     None           Instructions      Carlos Burris-  Admit to Hospital Medicine  Upon patient arrival to floor, please send SecureChat to [facility] or call extension [phone] (if no answer, this will flip to a beeper, so enter your call back number) for Hospital Medicine admit team assignment and for additional admit orders for the patient.  Do not page the attending physician associated with the patient on arrival (this physician may not be on duty at the time of arrival).  Rather, always call [phone] to reach the triage physician for orders and team assignment.

## 2022-02-04 PROBLEM — J39.2 OROPHARYNGEAL LESION: Status: ACTIVE | Noted: 2022-02-04

## 2022-02-04 LAB
ALLENS TEST: ABNORMAL
ALLENS TEST: ABNORMAL
ANION GAP SERPL CALC-SCNC: 9 MMOL/L (ref 8–16)
BUN SERPL-MCNC: 10 MG/DL (ref 6–20)
CALCIUM SERPL-MCNC: 8.7 MG/DL (ref 8.7–10.5)
CHLORIDE SERPL-SCNC: 95 MMOL/L (ref 95–110)
CO2 SERPL-SCNC: 31 MMOL/L (ref 23–29)
CREAT SERPL-MCNC: 0.9 MG/DL (ref 0.5–1.4)
DELSYS: ABNORMAL
DELSYS: ABNORMAL
EP: 12
ERYTHROCYTE [DISTWIDTH] IN BLOOD BY AUTOMATED COUNT: 13 % (ref 11.5–14.5)
EST. GFR  (AFRICAN AMERICAN): >60 ML/MIN/1.73 M^2
EST. GFR  (NON AFRICAN AMERICAN): >60 ML/MIN/1.73 M^2
FIO2: 24
FLOW: 3
GLUCOSE SERPL-MCNC: 140 MG/DL (ref 70–110)
HCO3 UR-SCNC: 34.1 MMOL/L (ref 24–28)
HCO3 UR-SCNC: 36.2 MMOL/L (ref 24–28)
HCT VFR BLD AUTO: 41.1 % (ref 37–48.5)
HGB BLD-MCNC: 12.3 G/DL (ref 12–16)
IP: 22
MCH RBC QN AUTO: 27.7 PG (ref 27–31)
MCHC RBC AUTO-ENTMCNC: 29.9 G/DL (ref 32–36)
MCV RBC AUTO: 93 FL (ref 82–98)
MIN VOL: 11
MODE: ABNORMAL
MODE: ABNORMAL
PCO2 BLDA: 71.8 MMHG (ref 35–45)
PCO2 BLDA: 72.3 MMHG (ref 35–45)
PH SMN: 7.28 [PH] (ref 7.35–7.45)
PH SMN: 7.31 [PH] (ref 7.35–7.45)
PLATELET # BLD AUTO: 395 K/UL (ref 150–450)
PMV BLD AUTO: 9.9 FL (ref 9.2–12.9)
PO2 BLDA: 67 MMHG (ref 80–100)
PO2 BLDA: 94 MMHG (ref 80–100)
POC BE: 10 MMOL/L
POC BE: 7 MMOL/L
POC SATURATED O2: 90 % (ref 95–100)
POC SATURATED O2: 96 % (ref 95–100)
POC TCO2: 36 MMOL/L (ref 23–27)
POC TCO2: 38 MMOL/L (ref 23–27)
POTASSIUM SERPL-SCNC: 4.2 MMOL/L (ref 3.5–5.1)
RBC # BLD AUTO: 4.44 M/UL (ref 4–5.4)
SAMPLE: ABNORMAL
SAMPLE: ABNORMAL
SITE: ABNORMAL
SITE: ABNORMAL
SODIUM SERPL-SCNC: 135 MMOL/L (ref 136–145)
SP02: 94
SP02: 96
SPONT RATE: 24
WBC # BLD AUTO: 11.73 K/UL (ref 3.9–12.7)

## 2022-02-04 PROCEDURE — 80048 BASIC METABOLIC PNL TOTAL CA: CPT | Performed by: INTERNAL MEDICINE

## 2022-02-04 PROCEDURE — 82803 BLOOD GASES ANY COMBINATION: CPT

## 2022-02-04 PROCEDURE — 94640 AIRWAY INHALATION TREATMENT: CPT

## 2022-02-04 PROCEDURE — 94799 UNLISTED PULMONARY SVC/PX: CPT

## 2022-02-04 PROCEDURE — 85027 COMPLETE CBC AUTOMATED: CPT | Performed by: INTERNAL MEDICINE

## 2022-02-04 PROCEDURE — 25000003 PHARM REV CODE 250: Performed by: EMERGENCY MEDICINE

## 2022-02-04 PROCEDURE — 63600175 PHARM REV CODE 636 W HCPCS: Performed by: EMERGENCY MEDICINE

## 2022-02-04 PROCEDURE — 94761 N-INVAS EAR/PLS OXIMETRY MLT: CPT

## 2022-02-04 PROCEDURE — 36600 WITHDRAWAL OF ARTERIAL BLOOD: CPT

## 2022-02-04 PROCEDURE — 25000242 PHARM REV CODE 250 ALT 637 W/ HCPCS: Performed by: INTERNAL MEDICINE

## 2022-02-04 PROCEDURE — 96366 THER/PROPH/DIAG IV INF ADDON: CPT

## 2022-02-04 PROCEDURE — 96375 TX/PRO/DX INJ NEW DRUG ADDON: CPT

## 2022-02-04 PROCEDURE — 99900035 HC TECH TIME PER 15 MIN (STAT)

## 2022-02-04 PROCEDURE — 99900031 HC PATIENT EDUCATION (STAT)

## 2022-02-04 PROCEDURE — 20000000 HC ICU ROOM

## 2022-02-04 PROCEDURE — 27000221 HC OXYGEN, UP TO 24 HOURS

## 2022-02-04 PROCEDURE — 25500020 PHARM REV CODE 255: Performed by: INTERNAL MEDICINE

## 2022-02-04 RX ORDER — BUDESONIDE 0.5 MG/2ML
0.5 INHALANT ORAL 2 TIMES DAILY
Status: DISCONTINUED | OUTPATIENT
Start: 2022-02-04 | End: 2022-02-05 | Stop reason: HOSPADM

## 2022-02-04 RX ORDER — ONDANSETRON 2 MG/ML
4 INJECTION INTRAMUSCULAR; INTRAVENOUS
Status: COMPLETED | OUTPATIENT
Start: 2022-02-04 | End: 2022-02-04

## 2022-02-04 RX ORDER — VANCOMYCIN HCL IN 5 % DEXTROSE 1G/250ML
1000 PLASTIC BAG, INJECTION (ML) INTRAVENOUS ONCE
Status: COMPLETED | OUTPATIENT
Start: 2022-02-04 | End: 2022-02-04

## 2022-02-04 RX ORDER — HYDROMORPHONE HYDROCHLORIDE 1 MG/ML
0.5 INJECTION, SOLUTION INTRAMUSCULAR; INTRAVENOUS; SUBCUTANEOUS
Status: COMPLETED | OUTPATIENT
Start: 2022-02-04 | End: 2022-02-04

## 2022-02-04 RX ORDER — LIDOCAINE HYDROCHLORIDE 10 MG/ML
5 INJECTION, SOLUTION EPIDURAL; INFILTRATION; INTRACAUDAL; PERINEURAL
Status: COMPLETED | OUTPATIENT
Start: 2022-02-04 | End: 2022-02-04

## 2022-02-04 RX ORDER — MORPHINE SULFATE 2 MG/ML
2 INJECTION, SOLUTION INTRAMUSCULAR; INTRAVENOUS
Status: COMPLETED | OUTPATIENT
Start: 2022-02-04 | End: 2022-02-04

## 2022-02-04 RX ORDER — ALBUTEROL SULFATE 0.83 MG/ML
2.5 SOLUTION RESPIRATORY (INHALATION) EVERY 6 HOURS
Status: DISCONTINUED | OUTPATIENT
Start: 2022-02-04 | End: 2022-02-05

## 2022-02-04 RX ADMIN — BUDESONIDE 0.5 MG: 0.5 INHALANT RESPIRATORY (INHALATION) at 08:02

## 2022-02-04 RX ADMIN — LIDOCAINE HYDROCHLORIDE 50 MG: 10 INJECTION, SOLUTION EPIDURAL; INFILTRATION; INTRACAUDAL; PERINEURAL at 09:02

## 2022-02-04 RX ADMIN — VANCOMYCIN HYDROCHLORIDE 1000 MG: 1 INJECTION, POWDER, LYOPHILIZED, FOR SOLUTION INTRAVENOUS at 01:02

## 2022-02-04 RX ADMIN — PIPERACILLIN AND TAZOBACTAM 4.5 G: 4; .5 INJECTION, POWDER, LYOPHILIZED, FOR SOLUTION INTRAVENOUS; PARENTERAL at 07:02

## 2022-02-04 RX ADMIN — VANCOMYCIN HYDROCHLORIDE 1000 MG: 1 INJECTION, POWDER, LYOPHILIZED, FOR SOLUTION INTRAVENOUS at 12:02

## 2022-02-04 RX ADMIN — GABAPENTIN 300 MG: 300 CAPSULE ORAL at 09:02

## 2022-02-04 RX ADMIN — PIPERACILLIN AND TAZOBACTAM 4.5 G: 4; .5 INJECTION, POWDER, LYOPHILIZED, FOR SOLUTION INTRAVENOUS; PARENTERAL at 02:02

## 2022-02-04 RX ADMIN — FLUOXETINE 40 MG: 20 CAPSULE ORAL at 10:02

## 2022-02-04 RX ADMIN — FUROSEMIDE 40 MG: 40 TABLET ORAL at 09:02

## 2022-02-04 RX ADMIN — IBUPROFEN 600 MG: 400 TABLET ORAL at 02:02

## 2022-02-04 RX ADMIN — IOHEXOL 100 ML: 350 INJECTION, SOLUTION INTRAVENOUS at 08:02

## 2022-02-04 RX ADMIN — ALBUTEROL SULFATE 2.5 MG: 2.5 SOLUTION RESPIRATORY (INHALATION) at 08:02

## 2022-02-04 RX ADMIN — PIPERACILLIN AND TAZOBACTAM 4.5 G: 4; .5 INJECTION, POWDER, LYOPHILIZED, FOR SOLUTION INTRAVENOUS; PARENTERAL at 11:02

## 2022-02-04 RX ADMIN — ONDANSETRON 4 MG: 2 INJECTION INTRAMUSCULAR; INTRAVENOUS at 09:02

## 2022-02-04 RX ADMIN — ONDANSETRON 4 MG: 2 INJECTION INTRAMUSCULAR; INTRAVENOUS at 02:02

## 2022-02-04 RX ADMIN — SPIRONOLACTONE 50 MG: 50 TABLET ORAL at 10:02

## 2022-02-04 RX ADMIN — HYDROMORPHONE HYDROCHLORIDE 0.5 MG: 1 INJECTION, SOLUTION INTRAMUSCULAR; INTRAVENOUS; SUBCUTANEOUS at 09:02

## 2022-02-04 RX ADMIN — MORPHINE SULFATE 2 MG: 2 INJECTION, SOLUTION INTRAMUSCULAR; INTRAVENOUS at 02:02

## 2022-02-04 NOTE — ED NOTES
New orders noted. Continue to monitor transfer progress in chart. NSR.  No RD. Alert and oriented.

## 2022-02-04 NOTE — ED NOTES
Resumed pt care.  41 YOF c/o throat pain 7/10 that radiates to right ear. Currently denies any CP. Denies any difficulty swallowing. O2 100% on 4 L nc. Pt speaking in complete sentences. No facial swelling notes. No drooling noted. Denies any other complaints.     Adult Physical Assessment  LOC: Patient is awake, alert, oriented and speaking appropriately at this time.  APPEARANCE: Patient resting comfortably and appears to be in no acute distress at this time. Patient is clean and well groomed, patient's clothing is properly fastened.  SKIN:The skin is warm and dry, color consistent with ethnicity, patient has normal skin turgor and moist mucus membranes, skin intact, no breakdown or brusing noted.  MUSCULOSKELETAL: Patient moving all extremities well, no obvious swelling or deformities noted.  RESPIRATORY: Airway is open and patent, respirations are spontaneous, patient has a normal effort and rate, no accessory muscle use noted.  CARDIAC: Patient has a normal rate and rhythm, no periphreal edema noted in any extremity, capillary refill < 3 seconds in all extremities.  ABDOMEN: Soft and non tender to palpation, no abdominal distention noted.  NEUROLOGIC: Eyes open spontaneously, behavior appropriate to situation, follows commands, facial expression symmetrical, bilateral hand grasp equal and even, purposeful motor response noted, normal sensation in all extremities when touched with a finger.      VSS. ED workup in progress. Call light within pt reach. Safety measures in place: side rails up X2. Will continue to monitor.

## 2022-02-05 ENCOUNTER — HOSPITAL ENCOUNTER (INPATIENT)
Facility: HOSPITAL | Age: 42
LOS: 2 days | Discharge: HOME | DRG: 152 | End: 2022-02-07
Attending: HOSPITALIST | Admitting: HOSPITALIST
Payer: MEDICAID

## 2022-02-05 VITALS
RESPIRATION RATE: 18 BRPM | DIASTOLIC BLOOD PRESSURE: 82 MMHG | TEMPERATURE: 99 F | WEIGHT: 293 LBS | HEIGHT: 67 IN | BODY MASS INDEX: 45.99 KG/M2 | OXYGEN SATURATION: 93 % | SYSTOLIC BLOOD PRESSURE: 136 MMHG | HEART RATE: 79 BPM

## 2022-02-05 DIAGNOSIS — J02.9 PHARYNGITIS, UNSPECIFIED ETIOLOGY: Primary | ICD-10-CM

## 2022-02-05 DIAGNOSIS — J39.0 RETROPHARYNGEAL ABSCESS: ICD-10-CM

## 2022-02-05 PROBLEM — E78.5 HLD (HYPERLIPIDEMIA): Status: ACTIVE | Noted: 2022-02-05

## 2022-02-05 PROBLEM — E66.2 OBESITY HYPOVENTILATION SYNDROME: Status: ACTIVE | Noted: 2022-02-05

## 2022-02-05 PROBLEM — J96.02 ACUTE HYPERCAPNIC RESPIRATORY FAILURE: Status: ACTIVE | Noted: 2022-02-05

## 2022-02-05 LAB
ALLENS TEST: ABNORMAL
ANION GAP SERPL CALC-SCNC: 10 MMOL/L (ref 8–16)
BUN SERPL-MCNC: 10 MG/DL (ref 6–20)
CALCIUM SERPL-MCNC: 8.9 MG/DL (ref 8.7–10.5)
CHLORIDE SERPL-SCNC: 97 MMOL/L (ref 95–110)
CO2 SERPL-SCNC: 31 MMOL/L (ref 23–29)
CREAT SERPL-MCNC: 1 MG/DL (ref 0.5–1.4)
DELSYS: ABNORMAL
EP: 12
ERYTHROCYTE [DISTWIDTH] IN BLOOD BY AUTOMATED COUNT: 13 % (ref 11.5–14.5)
ERYTHROCYTE [SEDIMENTATION RATE] IN BLOOD BY WESTERGREN METHOD: 24 MM/H
EST. GFR  (AFRICAN AMERICAN): >60 ML/MIN/1.73 M^2
EST. GFR  (NON AFRICAN AMERICAN): >60 ML/MIN/1.73 M^2
FIO2: 24
GLUCOSE SERPL-MCNC: 153 MG/DL (ref 70–110)
GLUCOSE SERPL-MCNC: 158 MG/DL (ref 70–110)
HCO3 UR-SCNC: 36 MMOL/L (ref 24–28)
HCT VFR BLD AUTO: 41 % (ref 37–48.5)
HCT VFR BLD CALC: 40 %PCV (ref 36–54)
HGB BLD-MCNC: 12.1 G/DL (ref 12–16)
IP: 24
MCH RBC QN AUTO: 27.8 PG (ref 27–31)
MCHC RBC AUTO-ENTMCNC: 29.5 G/DL (ref 32–36)
MCV RBC AUTO: 94 FL (ref 82–98)
MODE: ABNORMAL
PCO2 BLDA: 61.7 MMHG (ref 35–45)
PH SMN: 7.37 [PH] (ref 7.35–7.45)
PLATELET # BLD AUTO: 383 K/UL (ref 150–450)
PMV BLD AUTO: 10 FL (ref 9.2–12.9)
PO2 BLDA: 79 MMHG (ref 80–100)
POC BE: 11 MMOL/L
POC IONIZED CALCIUM: 1.21 MMOL/L (ref 1.06–1.42)
POC SATURATED O2: 95 % (ref 95–100)
POC TCO2: 38 MMOL/L (ref 23–27)
POTASSIUM BLD-SCNC: 3.8 MMOL/L (ref 3.5–5.1)
POTASSIUM SERPL-SCNC: 3.8 MMOL/L (ref 3.5–5.1)
RBC # BLD AUTO: 4.35 M/UL (ref 4–5.4)
SAMPLE: ABNORMAL
SITE: ABNORMAL
SODIUM BLD-SCNC: 138 MMOL/L (ref 136–145)
SODIUM SERPL-SCNC: 138 MMOL/L (ref 136–145)
WBC # BLD AUTO: 8.42 K/UL (ref 3.9–12.7)

## 2022-02-05 PROCEDURE — 25000242 PHARM REV CODE 250 ALT 637 W/ HCPCS: Performed by: INTERNAL MEDICINE

## 2022-02-05 PROCEDURE — 63600175 PHARM REV CODE 636 W HCPCS: Performed by: HOSPITALIST

## 2022-02-05 PROCEDURE — 94799 UNLISTED PULMONARY SVC/PX: CPT

## 2022-02-05 PROCEDURE — 80048 BASIC METABOLIC PNL TOTAL CA: CPT | Performed by: INTERNAL MEDICINE

## 2022-02-05 PROCEDURE — 94640 AIRWAY INHALATION TREATMENT: CPT

## 2022-02-05 PROCEDURE — 87040 BLOOD CULTURE FOR BACTERIA: CPT | Performed by: HOSPITALIST

## 2022-02-05 PROCEDURE — 99900031 HC PATIENT EDUCATION (STAT)

## 2022-02-05 PROCEDURE — 99900035 HC TECH TIME PER 15 MIN (STAT)

## 2022-02-05 PROCEDURE — 99223 PR INITIAL HOSPITAL CARE,LEVL III: ICD-10-PCS | Mod: ,,, | Performed by: HOSPITALIST

## 2022-02-05 PROCEDURE — 25000003 PHARM REV CODE 250: Performed by: EMERGENCY MEDICINE

## 2022-02-05 PROCEDURE — 20600001 HC STEP DOWN PRIVATE ROOM

## 2022-02-05 PROCEDURE — 85027 COMPLETE CBC AUTOMATED: CPT | Performed by: INTERNAL MEDICINE

## 2022-02-05 PROCEDURE — 84132 ASSAY OF SERUM POTASSIUM: CPT

## 2022-02-05 PROCEDURE — 36415 COLL VENOUS BLD VENIPUNCTURE: CPT | Performed by: INTERNAL MEDICINE

## 2022-02-05 PROCEDURE — 94761 N-INVAS EAR/PLS OXIMETRY MLT: CPT

## 2022-02-05 PROCEDURE — 63600175 PHARM REV CODE 636 W HCPCS: Performed by: EMERGENCY MEDICINE

## 2022-02-05 PROCEDURE — 25000242 PHARM REV CODE 250 ALT 637 W/ HCPCS: Performed by: NURSE PRACTITIONER

## 2022-02-05 PROCEDURE — 25000003 PHARM REV CODE 250: Performed by: HOSPITALIST

## 2022-02-05 PROCEDURE — 84295 ASSAY OF SERUM SODIUM: CPT

## 2022-02-05 PROCEDURE — 31575 DIAGNOSTIC LARYNGOSCOPY: CPT

## 2022-02-05 PROCEDURE — 82330 ASSAY OF CALCIUM: CPT

## 2022-02-05 PROCEDURE — 27000221 HC OXYGEN, UP TO 24 HOURS

## 2022-02-05 PROCEDURE — 36600 WITHDRAWAL OF ARTERIAL BLOOD: CPT

## 2022-02-05 PROCEDURE — 25000003 PHARM REV CODE 250: Performed by: NURSE PRACTITIONER

## 2022-02-05 PROCEDURE — 82803 BLOOD GASES ANY COMBINATION: CPT

## 2022-02-05 PROCEDURE — 99223 1ST HOSP IP/OBS HIGH 75: CPT | Mod: ,,, | Performed by: HOSPITALIST

## 2022-02-05 PROCEDURE — 63600175 PHARM REV CODE 636 W HCPCS: Performed by: NURSE PRACTITIONER

## 2022-02-05 PROCEDURE — 85014 HEMATOCRIT: CPT

## 2022-02-05 RX ORDER — IBUPROFEN 200 MG
24 TABLET ORAL
Status: DISCONTINUED | OUTPATIENT
Start: 2022-02-05 | End: 2022-02-07 | Stop reason: HOSPADM

## 2022-02-05 RX ORDER — TALC
6 POWDER (GRAM) TOPICAL NIGHTLY PRN
Status: DISCONTINUED | OUTPATIENT
Start: 2022-02-05 | End: 2022-02-07 | Stop reason: HOSPADM

## 2022-02-05 RX ORDER — CLONIDINE 0.2 MG/24H
1 PATCH, EXTENDED RELEASE TRANSDERMAL WEEKLY
Status: DISCONTINUED | OUTPATIENT
Start: 2022-02-12 | End: 2022-02-07 | Stop reason: HOSPADM

## 2022-02-05 RX ORDER — IPRATROPIUM BROMIDE AND ALBUTEROL SULFATE 2.5; .5 MG/3ML; MG/3ML
3 SOLUTION RESPIRATORY (INHALATION) EVERY 6 HOURS PRN
Status: DISCONTINUED | OUTPATIENT
Start: 2022-02-05 | End: 2022-02-07 | Stop reason: HOSPADM

## 2022-02-05 RX ORDER — CLONIDINE 0.2 MG/24H
1 PATCH, EXTENDED RELEASE TRANSDERMAL
Status: DISCONTINUED | OUTPATIENT
Start: 2022-02-05 | End: 2022-02-05 | Stop reason: HOSPADM

## 2022-02-05 RX ORDER — SODIUM CHLORIDE 0.9 % (FLUSH) 0.9 %
10 SYRINGE (ML) INJECTION
Status: DISCONTINUED | OUTPATIENT
Start: 2022-02-05 | End: 2022-02-05 | Stop reason: HOSPADM

## 2022-02-05 RX ORDER — TALC
6 POWDER (GRAM) TOPICAL NIGHTLY PRN
Status: DISCONTINUED | OUTPATIENT
Start: 2022-02-05 | End: 2022-02-05 | Stop reason: HOSPADM

## 2022-02-05 RX ORDER — LOSARTAN POTASSIUM 25 MG/1
25 TABLET ORAL DAILY
Status: DISCONTINUED | OUTPATIENT
Start: 2022-02-05 | End: 2022-02-05 | Stop reason: HOSPADM

## 2022-02-05 RX ORDER — PROCHLORPERAZINE EDISYLATE 5 MG/ML
5 INJECTION INTRAMUSCULAR; INTRAVENOUS EVERY 6 HOURS PRN
Status: DISCONTINUED | OUTPATIENT
Start: 2022-02-05 | End: 2022-02-07 | Stop reason: HOSPADM

## 2022-02-05 RX ORDER — BUDESONIDE 0.5 MG/2ML
0.5 INHALANT ORAL 2 TIMES DAILY
Refills: 0
Start: 2022-02-05 | End: 2023-02-05

## 2022-02-05 RX ORDER — ATORVASTATIN CALCIUM 20 MG/1
80 TABLET, FILM COATED ORAL NIGHTLY
Status: DISCONTINUED | OUTPATIENT
Start: 2022-02-05 | End: 2022-02-07 | Stop reason: HOSPADM

## 2022-02-05 RX ORDER — ALBUTEROL SULFATE 0.83 MG/ML
2.5 SOLUTION RESPIRATORY (INHALATION) EVERY 4 HOURS PRN
Status: DISCONTINUED | OUTPATIENT
Start: 2022-02-05 | End: 2022-02-05 | Stop reason: HOSPADM

## 2022-02-05 RX ORDER — ONDANSETRON 2 MG/ML
4 INJECTION INTRAMUSCULAR; INTRAVENOUS EVERY 8 HOURS PRN
Status: DISCONTINUED | OUTPATIENT
Start: 2022-02-05 | End: 2022-02-07 | Stop reason: HOSPADM

## 2022-02-05 RX ORDER — FLUTICASONE FUROATE AND VILANTEROL 100; 25 UG/1; UG/1
1 POWDER RESPIRATORY (INHALATION) DAILY
Status: DISCONTINUED | OUTPATIENT
Start: 2022-02-06 | End: 2022-02-07 | Stop reason: HOSPADM

## 2022-02-05 RX ORDER — SODIUM CHLORIDE 0.9 % (FLUSH) 0.9 %
10 SYRINGE (ML) INJECTION
Status: DISCONTINUED | OUTPATIENT
Start: 2022-02-05 | End: 2022-02-07 | Stop reason: HOSPADM

## 2022-02-05 RX ORDER — METOPROLOL SUCCINATE 25 MG/1
25 TABLET, EXTENDED RELEASE ORAL DAILY
Status: DISCONTINUED | OUTPATIENT
Start: 2022-02-05 | End: 2022-02-05 | Stop reason: HOSPADM

## 2022-02-05 RX ORDER — ISOSORBIDE MONONITRATE 30 MG/1
30 TABLET, EXTENDED RELEASE ORAL DAILY
Status: DISCONTINUED | OUTPATIENT
Start: 2022-02-05 | End: 2022-02-05 | Stop reason: HOSPADM

## 2022-02-05 RX ORDER — LOSARTAN POTASSIUM 25 MG/1
25 TABLET ORAL DAILY
Status: DISCONTINUED | OUTPATIENT
Start: 2022-02-06 | End: 2022-02-07 | Stop reason: HOSPADM

## 2022-02-05 RX ORDER — SPIRONOLACTONE 25 MG/1
50 TABLET ORAL DAILY
Status: DISCONTINUED | OUTPATIENT
Start: 2022-02-06 | End: 2022-02-07 | Stop reason: HOSPADM

## 2022-02-05 RX ORDER — IPRATROPIUM BROMIDE AND ALBUTEROL SULFATE 2.5; .5 MG/3ML; MG/3ML
3 SOLUTION RESPIRATORY (INHALATION) EVERY 6 HOURS
Status: DISCONTINUED | OUTPATIENT
Start: 2022-02-05 | End: 2022-02-05 | Stop reason: HOSPADM

## 2022-02-05 RX ORDER — NAPROXEN SODIUM 220 MG/1
81 TABLET, FILM COATED ORAL DAILY
Status: DISCONTINUED | OUTPATIENT
Start: 2022-02-06 | End: 2022-02-07 | Stop reason: HOSPADM

## 2022-02-05 RX ORDER — ENOXAPARIN SODIUM 100 MG/ML
40 INJECTION SUBCUTANEOUS EVERY 12 HOURS
Status: DISCONTINUED | OUTPATIENT
Start: 2022-02-05 | End: 2022-02-07 | Stop reason: HOSPADM

## 2022-02-05 RX ORDER — ONDANSETRON 2 MG/ML
4 INJECTION INTRAMUSCULAR; INTRAVENOUS EVERY 6 HOURS PRN
Status: DISCONTINUED | OUTPATIENT
Start: 2022-02-05 | End: 2022-02-05 | Stop reason: HOSPADM

## 2022-02-05 RX ORDER — PANTOPRAZOLE SODIUM 40 MG/1
40 TABLET, DELAYED RELEASE ORAL DAILY
Status: DISCONTINUED | OUTPATIENT
Start: 2022-02-05 | End: 2022-02-05 | Stop reason: HOSPADM

## 2022-02-05 RX ORDER — GABAPENTIN 300 MG/1
300 CAPSULE ORAL 2 TIMES DAILY
Status: DISCONTINUED | OUTPATIENT
Start: 2022-02-05 | End: 2022-02-05

## 2022-02-05 RX ORDER — FUROSEMIDE 40 MG/1
40 TABLET ORAL DAILY
Status: DISCONTINUED | OUTPATIENT
Start: 2022-02-06 | End: 2022-02-07 | Stop reason: HOSPADM

## 2022-02-05 RX ORDER — NAPROXEN SODIUM 220 MG/1
81 TABLET, FILM COATED ORAL DAILY
Status: DISCONTINUED | OUTPATIENT
Start: 2022-02-05 | End: 2022-02-05 | Stop reason: HOSPADM

## 2022-02-05 RX ORDER — ACETAMINOPHEN 325 MG/1
650 TABLET ORAL EVERY 4 HOURS PRN
Status: DISCONTINUED | OUTPATIENT
Start: 2022-02-05 | End: 2022-02-07 | Stop reason: HOSPADM

## 2022-02-05 RX ORDER — IBUPROFEN 200 MG
16 TABLET ORAL
Status: DISCONTINUED | OUTPATIENT
Start: 2022-02-05 | End: 2022-02-07 | Stop reason: HOSPADM

## 2022-02-05 RX ORDER — DILTIAZEM HYDROCHLORIDE 180 MG/1
360 CAPSULE, COATED, EXTENDED RELEASE ORAL DAILY
Status: DISCONTINUED | OUTPATIENT
Start: 2022-02-06 | End: 2022-02-07 | Stop reason: HOSPADM

## 2022-02-05 RX ORDER — METOPROLOL SUCCINATE 25 MG/1
25 TABLET, EXTENDED RELEASE ORAL DAILY
Status: DISCONTINUED | OUTPATIENT
Start: 2022-02-06 | End: 2022-02-07 | Stop reason: HOSPADM

## 2022-02-05 RX ORDER — NALOXONE HCL 0.4 MG/ML
0.02 VIAL (ML) INJECTION
Status: DISCONTINUED | OUTPATIENT
Start: 2022-02-05 | End: 2022-02-07 | Stop reason: HOSPADM

## 2022-02-05 RX ORDER — FLUOXETINE HYDROCHLORIDE 20 MG/1
40 CAPSULE ORAL DAILY
Status: DISCONTINUED | OUTPATIENT
Start: 2022-02-06 | End: 2022-02-07 | Stop reason: HOSPADM

## 2022-02-05 RX ORDER — DILTIAZEM HYDROCHLORIDE 180 MG/1
360 CAPSULE, COATED, EXTENDED RELEASE ORAL DAILY
Status: DISCONTINUED | OUTPATIENT
Start: 2022-02-05 | End: 2022-02-05 | Stop reason: HOSPADM

## 2022-02-05 RX ORDER — ATORVASTATIN CALCIUM 40 MG/1
80 TABLET, FILM COATED ORAL NIGHTLY
Status: DISCONTINUED | OUTPATIENT
Start: 2022-02-05 | End: 2022-02-05 | Stop reason: HOSPADM

## 2022-02-05 RX ORDER — ISOSORBIDE MONONITRATE 30 MG/1
30 TABLET, EXTENDED RELEASE ORAL DAILY
Status: DISCONTINUED | OUTPATIENT
Start: 2022-02-06 | End: 2022-02-07 | Stop reason: HOSPADM

## 2022-02-05 RX ORDER — ACETAMINOPHEN 325 MG/1
650 TABLET ORAL EVERY 4 HOURS PRN
Status: DISCONTINUED | OUTPATIENT
Start: 2022-02-05 | End: 2022-02-05 | Stop reason: HOSPADM

## 2022-02-05 RX ORDER — PANTOPRAZOLE SODIUM 40 MG/1
40 TABLET, DELAYED RELEASE ORAL DAILY
Status: DISCONTINUED | OUTPATIENT
Start: 2022-02-06 | End: 2022-02-07 | Stop reason: HOSPADM

## 2022-02-05 RX ADMIN — BUDESONIDE 0.5 MG: 0.5 INHALANT RESPIRATORY (INHALATION) at 07:02

## 2022-02-05 RX ADMIN — ONDANSETRON 4 MG: 2 INJECTION INTRAMUSCULAR; INTRAVENOUS at 09:02

## 2022-02-05 RX ADMIN — METOPROLOL SUCCINATE 25 MG: 25 TABLET, EXTENDED RELEASE ORAL at 08:02

## 2022-02-05 RX ADMIN — PIPERACILLIN AND TAZOBACTAM 4.5 G: 4; .5 INJECTION, POWDER, LYOPHILIZED, FOR SOLUTION INTRAVENOUS; PARENTERAL at 11:02

## 2022-02-05 RX ADMIN — CLONIDINE 1 PATCH: 0.2 PATCH TRANSDERMAL at 09:02

## 2022-02-05 RX ADMIN — DILTIAZEM HYDROCHLORIDE 360 MG: 180 CAPSULE, COATED, EXTENDED RELEASE ORAL at 08:02

## 2022-02-05 RX ADMIN — FUROSEMIDE 40 MG: 40 TABLET ORAL at 08:02

## 2022-02-05 RX ADMIN — ATORVASTATIN CALCIUM 80 MG: 20 TABLET, FILM COATED ORAL at 11:02

## 2022-02-05 RX ADMIN — THERA TABS 1 TABLET: TAB at 08:02

## 2022-02-05 RX ADMIN — ALBUTEROL SULFATE 2.5 MG: 2.5 SOLUTION RESPIRATORY (INHALATION) at 01:02

## 2022-02-05 RX ADMIN — ACETAMINOPHEN 650 MG: 325 TABLET, FILM COATED ORAL at 08:02

## 2022-02-05 RX ADMIN — FLUOXETINE 40 MG: 20 CAPSULE ORAL at 08:02

## 2022-02-05 RX ADMIN — PIPERACILLIN AND TAZOBACTAM 4.5 G: 4; .5 INJECTION, POWDER, LYOPHILIZED, FOR SOLUTION INTRAVENOUS; PARENTERAL at 03:02

## 2022-02-05 RX ADMIN — PANTOPRAZOLE SODIUM 40 MG: 40 TABLET, DELAYED RELEASE ORAL at 05:02

## 2022-02-05 RX ADMIN — IPRATROPIUM BROMIDE AND ALBUTEROL SULFATE 3 ML: .5; 3 SOLUTION RESPIRATORY (INHALATION) at 12:02

## 2022-02-05 RX ADMIN — PIPERACILLIN AND TAZOBACTAM 4.5 G: 4; .5 INJECTION, POWDER, LYOPHILIZED, FOR SOLUTION INTRAVENOUS; PARENTERAL at 06:02

## 2022-02-05 RX ADMIN — SPIRONOLACTONE 50 MG: 50 TABLET ORAL at 08:02

## 2022-02-05 RX ADMIN — ATORVASTATIN CALCIUM 80 MG: 40 TABLET, FILM COATED ORAL at 02:02

## 2022-02-05 RX ADMIN — IPRATROPIUM BROMIDE AND ALBUTEROL SULFATE 3 ML: .5; 3 SOLUTION RESPIRATORY (INHALATION) at 07:02

## 2022-02-05 RX ADMIN — ASPIRIN 81 MG 81 MG: 81 TABLET ORAL at 08:02

## 2022-02-05 RX ADMIN — LOSARTAN POTASSIUM 25 MG: 25 TABLET, FILM COATED ORAL at 08:02

## 2022-02-05 RX ADMIN — ISOSORBIDE MONONITRATE 30 MG: 30 TABLET, EXTENDED RELEASE ORAL at 08:02

## 2022-02-05 RX ADMIN — ENOXAPARIN SODIUM 40 MG: 100 INJECTION SUBCUTANEOUS at 11:02

## 2022-02-05 NOTE — CARE UPDATE
Patient sats dipped to 88 room air nurse placed on oxygen   02/05/22 0800   PRE-TX-O2   O2 Device (Oxygen Therapy) nasal cannula   $ Is the patient on Low Flow Oxygen? Yes   Flow (L/min) 1   SpO2 (!) 93 %   Pulse Oximetry Type Continuous   $ Pulse Oximetry - Multiple Charge Pulse Oximetry - Multiple

## 2022-02-05 NOTE — PROGRESS NOTES
VANCOMYCIN PHARMACOKINETIC NOTE:  Vancomycin Day # 1    Objective/Assessment:    Diagnosis/Indication for Vancomycin: SSTI    41 y.o., female; Actual Body Weight = (!) 196.1 kg (432 lb 5.2 oz).    The patient has the following labs:  2/5/2022 Estimated Creatinine Clearance: 134.9 mL/min (based on SCr of 1 mg/dL). Lab Results   Component Value Date    BUN 10 02/05/2022     Lab Results   Component Value Date    WBC 8.42 02/05/2022        Plan:    Initiate Vancomycin 2000 mg IV every 12 hours. Orders have been entered into patient's chart.      Vancomycin trough level has been ordered for 2/6/22 @ 0830.    Pharmacy will manage vancomycin therapy, monitor serum vancomycin levels, monitor renal function and adjust regimen as necessary.    Thank you for allowing us to participate in this patient's care.     Fiordaliza Coughlin 2/5/2022 9:15 AM  Department of Pharmacy  Ext [phone]

## 2022-02-05 NOTE — DISCHARGE SUMMARY
"[facility]  Discharge Summary      Patient Name: Maribel Hernandez  MRN: [id number]  Patient Class: IP- Inpatient  Admission Date: 2/2/2022  Hospital Length of Stay: 1 days  Discharge Date and Time:  02/05/2022 4:23 PM  Attending Physician: Nikki Rivas MD   Discharging Provider: Nikki Rivas MD  Primary Care Provider: Graciela Mercer NP      HPI:   No notes on file    * No surgery found *      Hospital Course:   2/5/2022  Pt has no SOB or stridor but does have increased ALMANZAR. I am concerned that Ms Hernandez has difficulty with breathing and some stridor . She has obesity hypoventilation syndrome     4:20PM  Pt has been accepted to [facility] or an ENT consult  Please see my earlier note for ROS and PE       Goals of Care Treatment Preferences:  Code Status: Full Code      Consults:   Consults (From admission, onward)        Status Ordering Provider     Pharmacy to dose Vancomycin consult  Once        Provider:  (Not yet assigned)   "And" Linked Group Details    Acknowledged NIKKI NARVAEZ     Inpatient consult to Pulmonology  Once        Provider:  Myriam Mcgill MD    Completed NIKKI RIVAS     Inpatient consult to Anesthesiology  Once        Provider:  Woodrow Arguello Jr., MD    Acknowledged CAROLINA DUMONT     Inpatient consult to ENT  Once        Provider:  (Not yet assigned)    Acknowledged MAY AVILES JR          No new Assessment & Plan notes have been filed under this hospital service since the last note was generated.  Service: Hospital Medicine    Final Active Diagnoses:    Diagnosis Date Noted POA    PRINCIPAL PROBLEM:  Oropharyngeal lesion [J39.2] 02/04/2022 Yes    COLIN on CPAP [G47.33, Z99.89] 09/07/2020 Not Applicable      Problems Resolved During this Admission:       Discharged Condition: fair    Disposition: Home or Self Care    Follow Up:   Follow-up Information     [facility].    Contact " information:  [address]  [facility] [id number]                     Patient Instructions:      Diet Dysphagia Soft     Diet diabetic     Diet Cardiac     Activity as tolerated       Significant Diagnostic Studies: Labs:   BMP:   Recent Labs   Lab 02/04/22 1942 02/05/22  0739   * 158*   * 138   K 4.2 3.8   CL 95 97   CO2 31* 31*   BUN 10 10   CREATININE 0.9 1.0   CALCIUM 8.7 8.9   , CMP   Recent Labs   Lab 02/04/22 1942 02/05/22  0739   * 138   K 4.2 3.8   CL 95 97   CO2 31* 31*   * 158*   BUN 10 10   CREATININE 0.9 1.0   CALCIUM 8.7 8.9   ANIONGAP 9 10   ESTGFRAFRICA >60.0 >60.0   EGFRNONAA >60.0 >60.0   , CBC   Recent Labs   Lab 02/04/22 1942 02/05/22  0700 02/05/22  0739   WBC 11.73  --  8.42   HGB 12.3  --  12.1   HCT 41.1   < > 41.0     --  383    < > = values in this interval not displayed.   , INR   Lab Results   Component Value Date    INR 1.1 01/11/2022    INR 1.1 11/13/2021    and All labs within the past 24 hours have been reviewed  Microbiology:   Blood Culture   Lab Results   Component Value Date    LABBLOO No Growth to date 02/03/2022    LABBLOO No Growth to date 02/03/2022    LABBLOO No Growth to date 02/03/2022    and Sputum Culture No results found for: GSRESP, RESPIRATORYC    Pending Diagnostic Studies:     None         Medications:  Reconciled Home Medications:      Medication List      START taking these medications    budesonide 0.5 mg/2 mL nebulizer solution  Commonly known as: PULMICORT  Take 2 mLs (0.5 mg total) by nebulization 2 (two) times daily. Controller        CONTINUE taking these medications    ADVAIR DISKUS 100-50 mcg/dose diskus inhaler  Generic drug: fluticasone-salmeterol 100-50 mcg/dose  Inhale 1 puff into the lungs every 4 to 6 hours as needed.     albuterol 90 mcg/actuation inhaler  Commonly known as: VENTOLIN HFA  Inhale 2 puffs into the lungs every 4 (four) hours as needed for Wheezing or Shortness of Breath. Rescue      albuterol-ipratropium 2.5 mg-0.5 mg/3 mL nebulizer solution  Commonly known as: DUO-NEB  Take 3 mLs by nebulization every 6 (six) hours while awake.     aspirin 81 MG Chew  Take 1 tablet (81 mg total) by mouth once daily.     atorvastatin 80 MG tablet  Commonly known as: LIPITOR  Take 1 tablet (80 mg total) by mouth every evening.     cloNIDine 0.2 mg/24 hr td ptwk 0.2 mg/24 hr  Commonly known as: CATAPRES  Place 1 patch onto the skin every 7 days. THURSDAYS     cyclobenzaprine 10 MG tablet  Commonly known as: FLEXERIL  Take 1 tablet by mouth 2 (two) times a day.     diltiaZEM 360 MG Cs24  Commonly known as: TIAZAC  Take 360 mg by mouth once daily.     ergocalciferol 50,000 unit Cap  Commonly known as: ERGOCALCIFEROL  Take 50,000 Units by mouth every 7 days. THURSDAYS     FLUoxetine 40 MG capsule  Take 40 mg by mouth once daily.     furosemide 40 MG tablet  Commonly known as: LASIX  Take 40 mg by mouth 2 (two) times daily.     gabapentin 300 MG capsule  Commonly known as: NEURONTIN  Take 300 mg by mouth 2 (two) times daily.     isosorbide mononitrate 30 MG 24 hr tablet  Commonly known as: IMDUR  Take 1 tablet (30 mg total) by mouth once daily.     metoprolol succinate 25 MG 24 hr tablet  Commonly known as: TOPROL-XL  Take 25 mg by mouth once daily.     multivitamin Tab  Take 1 tablet by mouth once daily.     pantoprazole 40 MG tablet  Commonly known as: PROTONIX  Take 1 tablet (40 mg total) by mouth once daily.     spironolactone 50 MG tablet  Commonly known as: ALDACTONE  Take 50 mg by mouth once daily.        STOP taking these medications    ALPRAZolam 2 MG Tab  Commonly known as: XANAX     estradioL 0.01 % (0.1 mg/gram) vaginal cream  Commonly known as: ESTRACE     HYDROcodone-acetaminophen  mg per tablet  Commonly known as: NORCO     losartan 25 MG tablet  Commonly known as: COZAAR     metFORMIN 500 MG tablet  Commonly known as: GLUCOPHAGE     metoprolol tartrate 25 MG tablet  Commonly known as:  LOPRESSOR     zolpidem 10 mg Tab  Commonly known as: AMBIEN            Indwelling Lines/Drains at time of discharge:   Lines/Drains/Airways     None                 Time spent on the discharge of patient: 40 minutes         Luis Rivas MD  Department of Hospital Medicine  [facility]

## 2022-02-05 NOTE — CARE UPDATE
02/04/22 2040   Patient Assessment/Suction   Level of Consciousness (AVPU) alert   Respiratory Effort Unlabored   Expansion/Accessory Muscles/Retractions expansion symmetric;no retractions;no use of accessory muscles   All Lung Fields Breath Sounds diminished   Rhythm/Pattern, Respiratory assisted mechanically   PRE-TX-O2   O2 Device (Oxygen Therapy) BiPAP   SpO2 (!) 92 %   Pulse 81   Resp (!) 22   Aerosol Therapy   $ Aerosol Therapy Charges Aerosol Treatment   Daily Review of Necessity (SVN) completed   Respiratory Treatment Status (SVN) given   Treatment Route (SVN) in-line   Patient Position (SVN) Perry's   Post Treatment Assessment (SVN) increased aeration;patient reports breathing improved   Signs of Intolerance (SVN) none   Breath Sounds Post-Respiratory Treatment   Throughout All Fields Post-Treatment All Fields   Throughout All Fields Post-Treatment aeration increased;clear   Post-treatment Heart Rate (beats/min) 82   Post-treatment Resp Rate (breaths/min) 23   Preset CPAP/BiPAP Settings   Mode Of Delivery BiPAP S/T   $ Is patient using? Yes   Patient CPAP/BiPAP Settings   RR Total (Breaths/Min) 23   Tidal Volume (mL) 542   VE Minute Ventilation (L/min) 13 L/min   Peak Inspiratory Pressure (cm H2O) 23   TiTOT (%) 35   Total Leak (L/Min) 2   Patient Trigger - ST Mode Only (%) 10   CPAP/BiPAP Backup Settings   Backup Rate 22 breaths per minute (bpm)   Education   $ Education Bronchodilator;Other (see comment);15 min  (budesonide)   Respiratory Evaluation   $ Care Plan Tech Time 15 min   $ Eval/Re-eval Charges Evaluation   Evaluation For New Orders

## 2022-02-05 NOTE — CARE UPDATE
02/04/22 2234   Patient Assessment/Suction   Level of Consciousness (AVPU) alert   PRE-TX-O2   O2 Device (Oxygen Therapy) BiPAP   SpO2 (!) 94 %   Pulse 81   Resp (!) 24   Preset CPAP/BiPAP Settings   Mode Of Delivery BiPAP S/T   $ Is patient using? Yes   Patient CPAP/BiPAP Settings   RR Total (Breaths/Min) 24   Tidal Volume (mL) 511   VE Minute Ventilation (L/min) 11 L/min   Peak Inspiratory Pressure (cm H2O) 23   TiTOT (%) 34   Total Leak (L/Min) 5   Patient Trigger - ST Mode Only (%) 9   Education   $ Education Other (see comment);15 min  (repeat abg)   Labs   $ Was an ABG obtained? Arterial Puncture;ISTAT - Blood gas   $ Labs Tech Time 30 min   Critical Value Communication   Date Result Received 02/04/22   Time Result Received 2236   Resulting Department of Critical Value resp   Who communicated critical value from resulting department? dblack   Critical Test #1 pco2   Critical Test #1 Result 71.8   Name of Notified Physician/Designee MD Ana Maria   Date Notified 02/04/22   Time Notified 2236   Read Back Verification Yes   Respiratory Evaluation   $ Care Plan Tech Time 15 min   $ Eval/Re-eval Charges Evaluation   Evaluation For New Orders

## 2022-02-05 NOTE — ED NOTES
Pt received from room 16, RT placed pt on BiPAP 22/12. Pt tolerating BiPAP well. No distress noted at this time.

## 2022-02-05 NOTE — HOSPITAL COURSE
2/5/2022  Pt has no SOB or stridor but does have increased ALMANZAR. I am concerned that Ms Hernandez has difficulty with breathing and some stridor . She has obesity hypoventilation syndrome     4:20PM  Pt has been accepted to [facility] or an ENT consult  Please see my earlier note for ROS and PE

## 2022-02-05 NOTE — PROGRESS NOTES
[facility]  Progress Note    Patient Name: Maribel Hernandez  MRN: [id number]  Patient Class: IP- Inpatient   Admission Date: 2/2/2022  Length of Stay: 1 days  Attending Physician: Luis Rivas MD  Primary Care Provider: Graciela Mercer NP        Subjective:     Principal Problem:Oropharyngeal lesion        HPI:  No notes on file    Overview/Hospital Course:  2/5/2022  Pt has no SOB or stridor but does have increased ALMANZAR. I am concerned that Ms Hernandez has difficulty with breathing and some stridor . She has obesity hypoventilation syndrome       Interval History: Ms Hernandez has no respiratory distress at rest but does have some difficulty breathing on awakening and stridor which resolves.    Review of Systems   Constitutional: Positive for diaphoresis and fatigue.   HENT: Positive for trouble swallowing.         Mild odynophagia   Eyes: Negative.    Respiratory: Positive for shortness of breath.         ALMANZAR   Cardiovascular: Negative.    Gastrointestinal: Positive for abdominal distention. Negative for abdominal pain, nausea and vomiting.   Endocrine: Positive for heat intolerance.   Genitourinary: Negative.    Musculoskeletal: Positive for arthralgias, gait problem and myalgias.   Skin: Negative.    Allergic/Immunologic: Negative.    Neurological: Positive for weakness.   Hematological: Negative.    Psychiatric/Behavioral: Positive for dysphoric mood. The patient is nervous/anxious.      Objective:     Vital Signs (Most Recent):  Temp: 99.1 °F (37.3 °C) (02/05/22 0701)  Pulse: 83 (02/05/22 1000)  Resp: 17 (02/05/22 1000)  BP: (!) 104/49 (02/05/22 1000)  SpO2: (!) 92 % (02/05/22 1000) Vital Signs (24h Range):  Temp:  [97.9 °F (36.6 °C)-99.1 °F (37.3 °C)] 99.1 °F (37.3 °C)  Pulse:  [] 83  Resp:  [8-27] 17  SpO2:  [90 %-97 %] 92 %  BP: (100-163)/(49-96) 104/49     Weight: (!) 196.1 kg (432 lb 5.2 oz)  Body mass index is 67.71 kg/m².    Intake/Output Summary (Last  24 hours) at 2/5/2022 1156  Last data filed at 2/5/2022 0625  Gross per 24 hour   Intake 200 ml   Output --   Net 200 ml      Physical Exam  Vitals and nursing note reviewed.   Constitutional:       General: She is not in acute distress.  HENT:      Head: Normocephalic and atraumatic.      Nose: Nose normal.      Mouth/Throat:      Mouth: Mucous membranes are moist.   Eyes:      Extraocular Movements: Extraocular movements intact.      Pupils: Pupils are equal, round, and reactive to light.   Cardiovascular:      Rate and Rhythm: Normal rate and regular rhythm.   Pulmonary:      Effort: Pulmonary effort is normal.      Breath sounds: Rales present.      Comments: Occasional bibasilar  Diminished basilar breath sounds  Musculoskeletal:         General: Normal range of motion.      Cervical back: Normal range of motion and neck supple.   Skin:     General: Skin is warm.   Neurological:      Mental Status: She is alert and oriented to person, place, and time.   Psychiatric:      Comments: Mildly dysphoric mood         Significant Labs:   All pertinent labs within the past 24 hours have been reviewed.  Recent Lab Results       02/05/22  0739   02/05/22  0700   02/04/22  2236   02/04/22  1942   02/04/22  1937        Allens Test   Pass   Pass     Pass       Anion Gap 10       9         Site   RB   LR     RR       BUN 10       10         Calcium 8.9       8.7         Chloride 97       95         CO2 31       31         Creatinine 1.0       0.9         DelSys   CPAP/BiPAP   CPAP/BiPAP     Nasal Can       eGFR if  >60.0       >60.0         eGFR if non  >60.0  Comment: Calculation used to obtain the estimated glomerular filtration  rate (eGFR) is the CKD-EPI equation.          >60.0  Comment: Calculation used to obtain the estimated glomerular filtration  rate (eGFR) is the CKD-EPI equation.            EP   12   12           FiO2   24   24           Flow         3       Glucose 158       140          Hematocrit 41.0       41.1         Hemoglobin 12.1       12.3         IP   24   22           MCH 27.8       27.7         MCHC 29.5       29.9         MCV 94       93         Min Vol     11           Mode   BiPAP   BiPAP     SPONT       MPV 10.0       9.9         Platelets 383       395         POC BE   11   10     7       POC Glucose   153             POC HCO3   36.0   36.2     34.1       POC Hematocrit   40             POC Ionized Calcium   1.21             POC PCO2   61.7   71.8     72.3       POC PH   7.374   7.310     7.282       POC PO2   79   67     94       POC Potassium   3.8             POC SATURATED O2   95   90     96       POC Sodium   138             POC TCO2   38   38     36       Potassium 3.8       4.2         Rate   24             RBC 4.35       4.44         RDW 13.0       13.0         Sample   ARTERIAL   ARTERIAL     ARTERIAL       Sodium 138       135         Sp02     94     96       Spont Rate     24           WBC 8.42       11.73                              Significant Imaging: I have reviewed all pertinent imaging results/findings within the past 24 hours.      Assessment/Plan:    2/5/2022  A)  Pt has obesity hypoventilation syndrome with increased risk of hypoventilation with retropharyngeal edema . She uses CPAP in the PM  I believe that she would benefit from a ENT video laryngoscopy and no one is available  BMI 67  HTN  MDD  P)  Pt is stable for transfer to telemetry  Spoke with Dr Weaver at [facility] along with the ENT on call and they noted the facility is on diversion  Continuous pulse oximetry  No notes have been filed under this hospital service.  Service: Hospital Medicine    VTE Risk Mitigation (From admission, onward)         Ordered     IP VTE HIGH RISK PATIENT  Once         02/05/22 0039     Place sequential compression device  Until discontinued         02/05/22 0039                Discharge Planning   BUDDY: 2/6/2022     Code Status: Full Code   Is the patient  medically ready for discharge?:     Reason for patient still in hospital (select all that apply): Patient new problem, Treatment, Consult recommendations and Pending disposition                     Luis Rivas MD  Department of Hospital Medicine   [facility], [facility]

## 2022-02-05 NOTE — CARE UPDATE
02/04/22 1950   Patient Assessment/Suction   Level of Consciousness (AVPU) alert   Respiratory Effort Unlabored   Expansion/Accessory Muscles/Retractions expansion symmetric;no retractions;no use of accessory muscles   All Lung Fields Breath Sounds diminished   Rhythm/Pattern, Respiratory assisted mechanically   PRE-TX-O2   O2 Device (Oxygen Therapy) BiPAP   Oxygen Concentration (%) 24   SpO2 (!) 93 %   Pulse 84   Resp (!) 22   Skin Integrity   $ Wound Care Tech Time 15 min   Area Observed Bridge of nose   Skin Appearance without discoloration   Equipment Change   $ RT Equipment large full face mask   Ready to Wean/Extubation Screen   FIO2<=50 (chart decimal) 0.24   Preset CPAP/BiPAP Settings   Mode Of Delivery BiPAP S/T   $ Initial CPAP/BiPAP Setup? Yes   $ Is patient using? Yes   Equipment Type V60   Airway Device Type medium full face mask   Ipap 22   EPAP (cm H2O) 12   Pressure Support (cm H2O) 10   Set Rate (Breaths/Min) 22   ITime (sec) 0.9   Rise Time (sec) 0.1   Patient CPAP/BiPAP Settings   RR Total (Breaths/Min) 22   Tidal Volume (mL) 411   VE Minute Ventilation (L/min) 10 L/min   Peak Inspiratory Pressure (cm H2O) 23   TiTOT (%) 33   Total Leak (L/Min) 12   Patient Trigger - ST Mode Only (%) 13   Education   $ Education BiPAP;15 min   Transport Patient   $ Transport Tech Time Charge 30 min   Oxygen Method BiPAP   Transport to CT   Toleration Good   Respiratory Evaluation   $ Care Plan Tech Time 15 min   $ Eval/Re-eval Charges Evaluation   Evaluation For New Orders

## 2022-02-05 NOTE — CARE UPDATE
02/05/22 0114   Patient Assessment/Suction   Level of Consciousness (AVPU) alert  (sleeping)   Respiratory Effort Unlabored   Expansion/Accessory Muscles/Retractions expansion symmetric;no retractions;no use of accessory muscles   All Lung Fields Breath Sounds diminished   LLL Breath Sounds wheezes, inspiratory   RLL Breath Sounds wheezes, expiratory   Rhythm/Pattern, Respiratory assisted mechanically   PRE-TX-O2   O2 Device (Oxygen Therapy) BiPAP   Oxygen Concentration (%) 24   SpO2 (!) 94 %   Pulse 73   Resp (!) 24   Aerosol Therapy   $ Aerosol Therapy Charges Aerosol Treatment   Daily Review of Necessity (SVN) completed   Respiratory Treatment Status (SVN) given   Treatment Route (SVN) in-line   Patient Position (SVN) Perry's   Post Treatment Assessment (SVN) breath sounds unchanged   Signs of Intolerance (SVN) none   Breath Sounds Post-Respiratory Treatment   Throughout All Fields Post-Treatment All Fields   Throughout All Fields Post-Treatment no change   Post-treatment Heart Rate (beats/min) 74   Post-treatment Resp Rate (breaths/min) 24   Skin Integrity   $ Wound Care Tech Time 15 min   Area Observed Bridge of nose   Skin Appearance without discoloration   Barrier used? Gel Cushion   Ready to Wean/Extubation Screen   FIO2<=50 (chart decimal) 0.24   Preset CPAP/BiPAP Settings   Mode Of Delivery BiPAP S/T   $ Is patient using? Yes   Equipment Type V60   Ipap 24   EPAP (cm H2O) 12   Pressure Support (cm H2O) 12   Set Rate (Breaths/Min) 24   Patient CPAP/BiPAP Settings   RR Total (Breaths/Min) 24   Tidal Volume (mL) 504   VE Minute Ventilation (L/min) 12 L/min   Peak Inspiratory Pressure (cm H2O) 25   TiTOT (%) 36   Total Leak (L/Min) 0   Patient Trigger - ST Mode Only (%) 2   CPAP/BiPAP Backup Settings   Backup Rate 24 breaths per minute (bpm)   Respiratory Evaluation   $ Care Plan Tech Time 15 min   $ Eval/Re-eval Charges Re-evaluation   Evaluation For   (care plan)

## 2022-02-05 NOTE — CARE UPDATE
02/04/22 1937   Patient Assessment/Suction   Level of Consciousness (AVPU) alert   Respiratory Effort Unlabored;Shallow   Expansion/Accessory Muscles/Retractions expansion symmetric;no retractions;no use of accessory muscles   All Lung Fields Breath Sounds diminished   Rhythm/Pattern, Respiratory pattern regular;unlabored;no shortness of breath reported   PRE-TX-O2   O2 Device (Oxygen Therapy) nasal cannula w/ humidification   $ Is the patient on Low Flow Oxygen? Yes   Flow (L/min) 3   SpO2 96 %   Pulse Oximetry Type Continuous   $ Pulse Oximetry - Multiple Charge Pulse Oximetry - Multiple   Pulse 82   Resp 18   Education   $ Education Other (see comment);15 min  (abg)   Labs   $ Was an ABG obtained? Arterial Puncture;ISTAT - Blood gas   $ Labs Tech Time 30 min   Critical Value Communication   Date Result Received 02/04/22   Time Result Received 1940   Resulting Department of Critical Value resp   Who communicated critical value from resulting department? dblack   Critical Test #1 ph   Critical Test #1 Result 7.282   Critical Test #2 pco2   Critical Test #2 Result 72.3   Name of Notified Physician/Designee MD Ana Maria   Date Notified 02/04/22   Time Notified 1940   Read Back Verification Yes   Respiratory Evaluation   $ Care Plan Tech Time 15 min   $ Eval/Re-eval Charges Evaluation   Evaluation For New Orders

## 2022-02-05 NOTE — PROGRESS NOTES
"[facility]  Adult Nutrition   Progress Note (Initial Assessment)     SUMMARY     Recommendations/Interventions:    Recommendation/Intervention: 1. Continue current diet as tolerated, encourage intake. 2.  to assist in meal choices daily.  Goals: 1. Patient to meet al least 75% of estimated needs through meal intake.  Nutrition Goal Status: new    Dietitian Rounds Brief:  · ICU Status. Patient presented with chest pain, sore throat and difficulty swallowing. Tolerating diet without issues. MD discussed the need for weight loss with patient. Patient sleeping during time of rounds-unable to fully interview patient. Will obtain full nutritional assessment at follow-up.    Reason for Assessment  Reason For Assessment: identified at risk by screening criteria (ICU Status)  Diagnosis:  (oropharyngeal lesion)  Relevant Medical History: COLIN on CPAP, COVID-19, HTN, depression  Interdisciplinary Rounds: did not attend    Nutrition Risk Screen  Nutrition Risk Screen: no indicators present     MST Score: 0  Have you recently lost weight without trying?: No  Weight loss score: 0  Have you been eating poorly because of a decreased appetite?: No  Appetite score: 0     Nutrition/Diet History  Food Allergies: NKFA  Factors Affecting Nutritional Intake: None identified at this time    Anthropometrics  Temp: 97.9 °F (36.6 °C)  Height Method: Stated  Height: 5' 7" (170.2 cm)  Height (inches): 67 in  Weight Method: Bed Scale  Weight: (!) 196.1 kg (432 lb 5.2 oz)  Weight (lb): (!) 432.33 lb  Ideal Body Weight (IBW), Female: 135 lb  % Ideal Body Weight, Female (lb): 320.24 %  BMI (Calculated): 67.7  BMI Grade: greater than 40 - morbid obesity     Weight History:  Wt Readings from Last 10 Encounters:   02/05/22 (!) 196.1 kg (432 lb 5.2 oz)   02/01/22 (!) 199.5 kg (439 lb 13.1 oz)   01/11/22 (!) 201.6 kg (444 lb 7.2 oz)   11/14/21 (!) 196.4 kg (432 lb 15.7 oz)   11/14/21 (!) 197.3 kg (435 lb)   10/01/20 (!) 178.3 kg " (393 lb)   09/25/20 (!) 192.7 kg (424 lb 11.5 oz)   09/08/20 (!) 180.5 kg (397 lb 14.9 oz)   08/18/20 (!) 186 kg (410 lb)   08/08/17 (!) 176.9 kg (390 lb)     Lab/Procedures/Meds: Pertinent Labs Reviewed  Clinical Chemistry:  Recent Labs   Lab 02/02/22 1929 02/05/22  0739   * 138   K 4.2 3.8   CL 97 97   CO2 27 31*   * 158*   BUN 8 10   CREATININE 0.7 1.0   CALCIUM 8.9 8.9   PROT 8.2  --    ALBUMIN 3.8  --    BILITOT 2.2*  --    ALKPHOS 132  --    AST 29  --    ALT 36  --    ANIONGAP 11 10   ESTGFRAFRICA >60.0 >60.0   EGFRNONAA >60.0 >60.0    < > = values in this interval not displayed.     CBC:   Recent Labs   Lab 02/05/22  0739   WBC 8.42   RBC 4.35   HGB 12.1   HCT 41.0      MCV 94   MCH 27.8   MCHC 29.5*     Cardiac Profile:  Recent Labs   Lab 02/02/22 1929 02/02/22  2238   BNP 48  --    TROPONINI <0.030 <0.030     Thyroid & Parathyroid:  Recent Labs   Lab 02/02/22 1929   TSH 1.290     Medications: Pertinent Medications reviewed  Scheduled Meds:   albuterol-ipratropium  3 mL Nebulization Q6H    aspirin  81 mg Oral Daily    atorvastatin  80 mg Oral QHS    budesonide  0.5 mg Nebulization BID    cloNIDine 0.2 mg/24 hr td ptwk  1 patch Transdermal Q7 Days    diltiaZEM  360 mg Oral Daily    FLUoxetine  40 mg Oral Daily    furosemide  40 mg Oral Daily    gabapentin  300 mg Oral BID    isosorbide mononitrate  30 mg Oral Daily    losartan  25 mg Oral Daily    metoprolol succinate  25 mg Oral Daily    multivitamin  1 tablet Oral Daily    pantoprazole  40 mg Oral Daily    piperacillin-tazobactam (ZOSYN) IVPB  4.5 g Intravenous Q8H    spironolactone  50 mg Oral Daily    vancomycin (VANCOCIN) IVPB  2,000 mg Intravenous Q12H     Continuous Infusions:  PRN Meds:.acetaminophen, albuterol sulfate, melatonin, ondansetron, sodium chloride 0.9%, Pharmacy to dose Vancomycin consult **AND** vancomycin - pharmacy to dose    Antibiotics (From admission, onward)            Start     Stop Route  "Frequency Ordered    02/05/22 0900  vancomycin (VANCOCIN) 2,000 mg in dextrose 5 % 500 mL IVPB         -- IV Every 12 hours (non-standard times) 02/05/22 0824    02/03/22 1920  vancomycin - pharmacy to dose  (vancomycin IVPB)        "And" Linked Group Details    -- IV pharmacy to manage frequency 02/03/22 1821    02/03/22 0300  piperacillin-tazobactam 4.5 g in dextrose 5 % 100 mL IVPB (ready to mix system)         -- IV Every 8 hours (non-standard times) 02/03/22 0205        Estimated/Assessed Needs  Weight Used For Calorie Calculations: 61.2 kg (135 lb) (Ideal body weight)  Energy Calorie Requirements (kcal): 1785-5766 kcals/day (25-30 kcals/kg IBW)  Energy Need Method: Kcal/kg  Protein Requirements:  g/day (1.5-2.0 g/kg IBW)  Weight Used For Protein Calculations: 61.2 kg (135 lb) (ideal body weight)  Fluid Requirements (mL): 1 mL/kcal or per MD     RDA Method (mL): 1531     Nutrition Prescription Ordered    Current Diet Order: Bariatric Soft    Evaluation of Received Nutrient/Fluid Intake    Energy Calories Required: not meeting needs  Protein Required: not meeting needs  Fluid Required: meeting needs  Tolerance: tolerating  % Intake of Estimated Energy Needs: 50 - 75 %  % Meal Intake: 50 - 75 %    Intake/Output Summary (Last 24 hours) at 2/5/2022 0858  Last data filed at 2/5/2022 0625  Gross per 24 hour   Intake 200 ml   Output --   Net 200 ml      Nutrition Risk    Level of Risk/Frequency of Follow-up: moderate - high   Monitor and Evaluation    Food and Nutrient Intake: energy intake,food and beverage intake  Food and Nutrient Adminstration: diet order  Physical Activity and Function: nutrition-related ADLs and IADLs,factors affecting access to physical activity  Anthropometric Measurements: weight,weight change,body mass index  Biochemical Data, Medical Tests and Procedures: electrolyte and renal panel,lipid profile,gastrointestinal profile,glucose/endocrine profile,inflammatory " profile  Nutrition-Focused Physical Findings: overall appearance     Nutrition Follow-Up    RD Follow-up?: Yes  Socorro Burt RD 02/05/2022 10:04 AM

## 2022-02-05 NOTE — SUBJECTIVE & OBJECTIVE
Interval History: Ms Hernandez has no respiratory distress at rest but does have some difficulty breathing on awakening and stridor which resolves.    Review of Systems   Constitutional: Positive for diaphoresis and fatigue.   HENT: Positive for trouble swallowing.         Mild odynophagia   Eyes: Negative.    Respiratory: Positive for shortness of breath.         ALMANZAR   Cardiovascular: Negative.    Gastrointestinal: Positive for abdominal distention. Negative for abdominal pain, nausea and vomiting.   Endocrine: Positive for heat intolerance.   Genitourinary: Negative.    Musculoskeletal: Positive for arthralgias, gait problem and myalgias.   Skin: Negative.    Allergic/Immunologic: Negative.    Neurological: Positive for weakness.   Hematological: Negative.    Psychiatric/Behavioral: Positive for dysphoric mood. The patient is nervous/anxious.      Objective:     Vital Signs (Most Recent):  Temp: 99.1 °F (37.3 °C) (02/05/22 0701)  Pulse: 83 (02/05/22 1000)  Resp: 17 (02/05/22 1000)  BP: (!) 104/49 (02/05/22 1000)  SpO2: (!) 92 % (02/05/22 1000) Vital Signs (24h Range):  Temp:  [97.9 °F (36.6 °C)-99.1 °F (37.3 °C)] 99.1 °F (37.3 °C)  Pulse:  [] 83  Resp:  [8-27] 17  SpO2:  [90 %-97 %] 92 %  BP: (100-163)/(49-96) 104/49     Weight: (!) 196.1 kg (432 lb 5.2 oz)  Body mass index is 67.71 kg/m².    Intake/Output Summary (Last 24 hours) at 2/5/2022 1156  Last data filed at 2/5/2022 0625  Gross per 24 hour   Intake 200 ml   Output --   Net 200 ml      Physical Exam  Vitals and nursing note reviewed.   Constitutional:       General: She is not in acute distress.  HENT:      Head: Normocephalic and atraumatic.      Nose: Nose normal.      Mouth/Throat:      Mouth: Mucous membranes are moist.   Eyes:      Extraocular Movements: Extraocular movements intact.      Pupils: Pupils are equal, round, and reactive to light.   Cardiovascular:      Rate and Rhythm: Normal rate and regular rhythm.   Pulmonary:      Effort:  Pulmonary effort is normal.      Breath sounds: Rales present.      Comments: Occasional bibasilar  Diminished basilar breath sounds  Musculoskeletal:         General: Normal range of motion.      Cervical back: Normal range of motion and neck supple.   Skin:     General: Skin is warm.   Neurological:      Mental Status: She is alert and oriented to person, place, and time.   Psychiatric:      Comments: Mildly dysphoric mood         Significant Labs:   All pertinent labs within the past 24 hours have been reviewed.  Recent Lab Results       02/05/22  0739   02/05/22  0700   02/04/22  2236   02/04/22  1942   02/04/22  1937        Allens Test   Pass   Pass     Pass       Anion Gap 10       9         Site   RB   LR     RR       BUN 10       10         Calcium 8.9       8.7         Chloride 97       95         CO2 31       31         Creatinine 1.0       0.9         DelSys   CPAP/BiPAP   CPAP/BiPAP     Nasal Can       eGFR if  >60.0       >60.0         eGFR if non  >60.0  Comment: Calculation used to obtain the estimated glomerular filtration  rate (eGFR) is the CKD-EPI equation.          >60.0  Comment: Calculation used to obtain the estimated glomerular filtration  rate (eGFR) is the CKD-EPI equation.            EP   12   12           FiO2   24   24           Flow         3       Glucose 158       140         Hematocrit 41.0       41.1         Hemoglobin 12.1       12.3         IP   24   22           MCH 27.8       27.7         MCHC 29.5       29.9         MCV 94       93         Min Vol     11           Mode   BiPAP   BiPAP     SPONT       MPV 10.0       9.9         Platelets 383       395         POC BE   11   10     7       POC Glucose   153             POC HCO3   36.0   36.2     34.1       POC Hematocrit   40             POC Ionized Calcium   1.21             POC PCO2   61.7   71.8     72.3       POC PH   7.374   7.310     7.282       POC PO2   79   67     94       POC Potassium    3.8             POC SATURATED O2   95   90     96       POC Sodium   138             POC TCO2   38   38     36       Potassium 3.8       4.2         Rate   24             RBC 4.35       4.44         RDW 13.0       13.0         Sample   ARTERIAL   ARTERIAL     ARTERIAL       Sodium 138       135         Sp02     94     96       Spont Rate     24           WBC 8.42       11.73                              Significant Imaging: I have reviewed all pertinent imaging results/findings within the past 24 hours.

## 2022-02-05 NOTE — CONSULTS
Pulmonary/Critical Care Consult      PATIENT NAME: Maribel Hernandez  MRN: [id number]  TODAY'S DATE: 2022  9:24 AM  ADMIT DATE: 2022  AGE: 41 y.o. : 1980    CONSULT REQUESTED BY: Luis Rivas MD    REASON FOR CONSULT:   Obesity hypoventilation syndrome    HPI:  Patient is in a extremely morbidly obese 41-year-old who presented with retropharyngeal cellulitis and edema.  She was lethargic and hypercapnic and placed on BiPAP.  The patient has a history of obstructive sleep apnea and had a CPAP machine but did not use it after she got COVID in  and it was taken away.    REVIEW OF SYSTEMS  GENERAL: Feeling better  EYES: Vision is good.  ENT:  Her throat is better.  HEART: No chest pain or palpitations.  LUNGS: No cough, sputum, or wheezing.  GI: No Nausea, vomiting, constipation, diarrhea, or reflux.  : No dysuria, hesitancy, or nocturia.  SKIN: No lesions or rashes.   MUSCULOSKELETAL: No joint pain or myalgias.  NEURO: No headaches or neuropathy.  LYMPH: No edema or adenopathy.  PSYCH: No anxiety or depression.  ENDO: No weight change.    ALLERGIES  Review of patient's allergies indicates:  No Known Allergies    INPATIENT SCHEDULED MEDICATIONS   albuterol-ipratropium  3 mL Nebulization Q6H    aspirin  81 mg Oral Daily    atorvastatin  80 mg Oral QHS    budesonide  0.5 mg Nebulization BID    cloNIDine 0.2 mg/24 hr td ptwk  1 patch Transdermal Q7 Days    diltiaZEM  360 mg Oral Daily    FLUoxetine  40 mg Oral Daily    furosemide  40 mg Oral Daily    gabapentin  300 mg Oral BID    isosorbide mononitrate  30 mg Oral Daily    losartan  25 mg Oral Daily    metoprolol succinate  25 mg Oral Daily    multivitamin  1 tablet Oral Daily    pantoprazole  40 mg Oral Daily    piperacillin-tazobactam (ZOSYN) IVPB  4.5 g Intravenous Q8H    spironolactone  50 mg Oral Daily    vancomycin (VANCOCIN) IVPB  2,000 mg Intravenous Q12H         MEDICAL AND SURGICAL HISTORY  Past Medical History:    Diagnosis Date    Anxiety     COVID-19     Depression     Heart attack     Hyperlipidemia     Hypertension     Obesity     Obesity      Past Surgical History:   Procedure Laterality Date    CHOLECYSTECTOMY      HYSTERECTOMY         ALCOHOL, TOBACCO AND DRUG USE  The patient does not work.  She lives alone.  Social History     Tobacco Use   Smoking Status Never Smoker   Smokeless Tobacco Not on file     Social History     Substance and Sexual Activity   Alcohol Use No     Social History     Substance and Sexual Activity   Drug Use Not on file       FAMILY HISTORY  Family History   Problem Relation Age of Onset    No Known Problems Mother     No Known Problems Father        VITAL SIGNS (MOST RECENT)  Temp: 97.9 °F (36.6 °C) (02/05/22 0241)  Pulse: 85 (02/05/22 0717)  Resp: (!) 21 (02/05/22 0717)  BP: (!) 148/87 (02/05/22 0853)  SpO2: (!) 93 % (02/05/22 0800)    INTAKE AND OUTPUT (LAST 24 HOURS):    Intake/Output Summary (Last 24 hours) at 2/5/2022 0924  Last data filed at 2/5/2022 0625  Gross per 24 hour   Intake 200 ml   Output --   Net 200 ml       WEIGHT  Wt Readings from Last 1 Encounters:   02/05/22 (!) 196.1 kg (432 lb 5.2 oz)       PHYSICAL EXAM  GENERAL: Midaged extremely obese patient looking sleepy.  HEENT: Pupils equal and reactive. Extraocular movements intact. Nose intact. Pharynx with a Mallampati 4  NECK: Supple.  Very large  HEART: Regular rate and rhythm. No murmur or gallop auscultated.  LUNGS:  Decreased breath sounds in the bases.. Lung excursion symmetrical. No change in fremitus.   ABDOMEN: Bowel sounds present. Non-tender, no masses palpated.  : Normal anatomy.  EXTREMITIES: Normal muscle tone and joint movement, no cyanosis or clubbing.   LYMPHATICS: No adenopathy palpated, trace edema.  SKIN: Dry, intact, no lesions.  Tattoos  NEURO: Cranial nerves II-XII intact. Motor strength 5/5 bilaterally, upper and lower extremities.  PSYCH: Appropriate affect      CBC LAST (LAST 24  HOURS)  Recent Labs   Lab 02/05/22  0739   WBC 8.42   RBC 4.35   HGB 12.1   HCT 41.0   MCV 94   MCH 27.8   MCHC 29.5*   RDW 13.0      MPV 10.0       CHEMISTRY LAST (LAST 24 HOURS)  Recent Labs   Lab 02/04/22  1942 02/05/22  0700 02/05/22  0739   NA   < >  --  138   K   < >  --  3.8   CL   < >  --  97   CO2   < >  --  31*   ANIONGAP   < >  --  10   BUN   < >  --  10   CREATININE   < >  --  1.0   GLU   < >  --  158*   CALCIUM   < >  --  8.9   PH  --  7.374  --     < > = values in this interval not displayed.         CARDIAC PROFILE (LAST 24 HOURS)  Recent Labs   Lab 02/02/22  1929 02/02/22 1929 02/02/22 2238   BNP 48  --   --    TROPONINI <0.030   < > <0.030    < > = values in this interval not displayed.       LAST 7 DAYS MICROBIOLOGY   Microbiology Results (last 7 days)     Procedure Component Value Units Date/Time    Clostridium difficile EIA [[id number]]     Order Status: No result Specimen: Stool     Blood culture #1 **CANNOT BE ORDERED STAT** [[id number]]  (Abnormal) Collected: 02/03/22 0251    Order Status: Completed Specimen: Blood from Peripheral, Antecubital, Right Updated: 02/05/22 0725     Blood Culture, Routine Gram stain aer bottle: Gram positive cocci       Results called to and read back by: Amor Santos Rn at  03:55 on       02/04/2022 by ADS      COAGULASE-NEGATIVE STAPHYLOCOCCUS SPECIES  Organism is a probable contaminant      Blood culture #2 **CANNOT BE ORDERED STAT** [[id number]] Collected: 02/03/22 0307    Order Status: Completed Specimen: Blood from Peripheral, Antecubital, Right Updated: 02/05/22 0432     Blood Culture, Routine No Growth to date      No Growth to date      No Growth to date    Group A Strep, Molecular [[id number]] Collected: 02/02/22 1935    Order Status: Completed Specimen: Throat Updated: 02/02/22 2007     Group A Strep, Molecular Negative     Comment: Arcanobacterium haemolyticum and Beta Streptococcus group C   and G will not be detected by this test method.  Please  order   Throat Culture ([id number]) if suspected.         Rapid strep screen [[id number]] Collected: 02/02/22 1935    Order Status: Sent Specimen: Throat           MOST RECENT IMAGING  CT Soft Tissue Neck With Contrast  CLINICAL HISTORY: Anterior neck pain and odynophagia    COMPARISON: CT soft tissue neck 2/2/2022    TECHNIQUE: A CT scan of the neck was performed after the intravenous administration of 100 ml of Omnipaque 350. Coronal and sagittal reformatted images were obtained. This exam was performed according to our departmental dose-optimization program, which includes automated exposure control, adjustment of the mA and/or kV according to patient size, and/or use of iterative reconstruction technique.    FINDINGS:  There is mild residual retropharyngeal edema without an enhancing rim extending from the level of the dens to C4. Previously identified mass effect is improved without significant narrowing of the oropharynx. Prominent retropharyngeal lymph nodes are no longer seen.    The nasopharynx, epiglottis, aryepiglottic folds, and larynx are otherwise intact, symmetric, and unremarkable. The parotid, submandibular, and thyroid glands are unremarkable. The major vascular structures within the neck are relatively normal in caliber. There is reversal of the normal cervical lordosis which may be secondary to patient positioning or muscle spasm. Mild degenerative changes of the cervical spine. The visualized portions of the lung apices are clear.    IMPRESSION: Mild residual retropharyngeal cellulitis, improved from prior, with no evidence of abscess.    Electronically signed by:  Kain Taylor DO  2/4/2022 9:00 PM CST Workstation: [id number]      CURRENT VISIT EKG  Results for orders placed or performed during the hospital encounter of 02/02/22   EKG 12-lead    Narrative    Test Reason : R07.9,    Vent. Rate : 089 BPM     Atrial Rate : 090 BPM     P-R Int : 118 ms          QRS Dur : 092 ms      QT Int : 370 ms        P-R-T Axes : 008 048 022 degrees     QTc Int : 450 ms    Normal sinus rhythm  Normal ECG  When compared with ECG of 12-JAN-2022 08:31,  Non-specific change in ST segment in Lateral leads    Referred By: [email or web address]   SELF           Confirmed By:        ECHOCARDIOGRAM RESULTS  Results for orders placed during the hospital encounter of 11/13/21    Echo    Interpretation Summary  · Mild concentric hypertrophy and normal systolic function.  · The estimated ejection fraction is 70%.  · Normal left ventricular diastolic function.  · Mild right ventricular enlargement with normal right ventricular systolic function.  · Mild left atrial enlargement.  · Normal central venous pressure (3 mmHg).        VENTILATOR INFORMATION  Oxygen Concentration (%):  [24] 24       LAST ARTERIAL BLOOD GAS  ABG  Recent Labs   Lab 02/05/22  0700   PH 7.374   PO2 79*   PCO2 61.7*   HCO3 36.0*   BE 11       IMPRESSION AND PLAN  Obesity hypoventilation syndrome  Extreme morbid obesity  Retropharyngeal cellulitis    Continue BiPAP when sleeping  Discussed the need for weight loss  Do not know if will be able to get a new BiPAP for this patient as she had her old 1 removed for noncompliance.    Myriam Mcgill MD  [facility]  Department of Pulmonology  Date of Service: 02/05/2022  9:24 AM

## 2022-02-05 NOTE — CARE UPDATE
Continue bipap as needed and tx   02/05/22 0701   Patient Assessment/Suction   Level of Consciousness (AVPU) alert   Respiratory Effort Unlabored   Expansion/Accessory Muscles/Retractions no use of accessory muscles;expansion symmetric   All Lung Fields Breath Sounds diminished   ТАТЬЯНА Breath Sounds diminished   LLL Breath Sounds diminished   RUL Breath Sounds diminished   RML Breath Sounds diminished   Rhythm/Pattern, Respiratory assisted mechanically   Cough Frequency no cough   PRE-TX-O2   O2 Device (Oxygen Therapy) BiPAP   $ Is the patient on Low Flow Oxygen? Yes   SpO2 (!) 94 %   Pulse Oximetry Type Continuous   $ Pulse Oximetry - Multiple Charge Pulse Oximetry - Multiple   Pulse 78   Resp (!) 24   Aerosol Therapy   $ Aerosol Therapy Charges Aerosol Treatment   Daily Review of Necessity (SVN) completed   Respiratory Treatment Status (SVN) given   Treatment Route (SVN) in-line   Patient Position (SVN) semi-Perry's   Post Treatment Assessment (SVN) breath sounds unchanged   Signs of Intolerance (SVN) none   Breath Sounds Post-Respiratory Treatment   Throughout All Fields Post-Treatment All Fields   Throughout All Fields Post-Treatment no change   Post-treatment Heart Rate (beats/min) 87   Post-treatment Resp Rate (breaths/min) 24   Skin Integrity   $ Wound Care Tech Time 15 min   Area Observed Bridge of nose   Skin Appearance without discoloration   Barrier used? Gel Cushion   Preset CPAP/BiPAP Settings   Mode Of Delivery BiPAP S/T   $ Is patient using? Yes   Ipap 24   EPAP (cm H2O) 12   Pressure Support (cm H2O) 12   Set Rate (Breaths/Min) 24   ITime (sec) 0.9   Rise Time (sec) 0.1   Patient CPAP/BiPAP Settings   RR Total (Breaths/Min) 24   Tidal Volume (mL) 525   VE Minute Ventilation (L/min) 12.3 L/min   Peak Inspiratory Pressure (cm H2O) 25   TiTOT (%) 37   Total Leak (L/Min) 25   Patient Trigger - ST Mode Only (%) 70   CPAP/BiPAP Backup Settings   Backup Rate 24 breaths per minute (bpm)   CPAP/BiPAP Alarms    High Pressure (cm H2O) 40   Low Pressure (cm H2O) 10   Minute Ventilation (L/Min) 3   High RR (breaths/min) 40   Low RR (breaths/min) 10   Apnea (Sec) 20   Education   $ Education DME BiPAP;15 min   Respiratory Evaluation   $ Care Plan Tech Time 15 min   $ Eval/Re-eval Charges Re-evaluation

## 2022-02-05 NOTE — H&P
[facility] Medicine History & Physical Examination   Patient Name: Maribel Hernandez  MRN: [id number]  Patient Class: IP- Inpatient   Admission Date: 2/2/2022  7:07 PM  Length of Stay: 0  Attending Physician: Luis Rodgers MD  Primary Care Provider: Graciela Mercer NP  Face-to-Face encounter date: 02/04/2022  Code Status: full code  Chief Complaint: Chest Pain (Right sided started today. Tightness radiating down from throat) and sore throat (Hard to swallow)          Patient information was obtained from patient, past medical records and ER records.   HISTORY OF PRESENT ILLNESS:   History was obtained from the patient ER physician Sign-out.  Patient is a 41-year-old female with history as stated below who presented to the ED on 02/02/2022 with complaint of chest pain and sore throat with difficulty swallowing.  Initial cardiac workup was unremarkable, but patient was found to have soft tissue swelling of the neck with findings consistent with retropharyngeal phlegmon/cellulitis but no sign of abscess.  She was started on IV Zosyn and vanc, and the plan was to transfer the patient to [facility] for EENT consult.    Due [facility] can being on diversion, the transfer took longer than expected, so the decision was to admit the patient until she is able to be transferred.  While in the ED the patient is found to be lethargic.  Blood gas revealed she was retaining CO2 so she was placed on BiPAP.  Repeat CT scan of the soft tissue of the neck shows residual retropharyngeal cellulitis that is improved from prior with no evidence of abscess.    The patient has no complaints at this time and actually states her pain has improved.  She is tolerating a soft diet.  She is currently chest pain free.      REVIEW OF SYSTEMS:   10 Point Review of System was performed and was found to be negative except for that mentioned already in the HPI above.     PAST MEDICAL HISTORY:     Past Medical History:    Diagnosis Date    Anxiety     COVID-19     Depression     Heart attack     Hyperlipidemia     Hypertension     Obesity     Obesity        PAST SURGICAL HISTORY:     Past Surgical History:   Procedure Laterality Date    CHOLECYSTECTOMY      HYSTERECTOMY         ALLERGIES:   Patient has no known allergies.    FAMILY HISTORY:     Family History   Problem Relation Age of Onset    No Known Problems Mother     No Known Problems Father        SOCIAL HISTORY:     Social History     Tobacco Use    Smoking status: Never Smoker    Smokeless tobacco: Not on file   Substance Use Topics    Alcohol use: No        Social History     Substance and Sexual Activity   Sexual Activity Yes    Birth control/protection: None        HOME MEDICATIONS:     Prior to Admission medications    Medication Sig Start Date End Date Taking? Authorizing Provider   ADVAIR DISKUS 100-50 mcg/dose diskus inhaler Inhale 1 puff into the lungs every 4 to 6 hours as needed. 12/10/21  Yes Historical Provider   albuterol-ipratropium (DUO-NEB) 2.5 mg-0.5 mg/3 mL nebulizer solution Take 3 mLs by nebulization every 6 (six) hours while awake. 12/8/21  Yes Historical Provider   alprazolam (XANAX) 2 MG Tab Take 2 mg by mouth 3 (three) times daily as needed (anxiety).    Yes Historical Provider   aspirin 81 MG Chew Take 1 tablet (81 mg total) by mouth once daily. 11/18/21 12/18/21 Yes Tessa Miranda MD   atorvastatin (LIPITOR) 80 MG tablet Take 1 tablet (80 mg total) by mouth every evening. 11/17/21 12/17/21 Yes Tessa Miranda MD   diltiaZEM (TIAZAC) 360 MG Cs24 Take 360 mg by mouth once daily.   Yes Historical Provider   fluoxetine (PROZAC) 40 MG capsule Take 40 mg by mouth once daily.   Yes Historical Provider   furosemide (LASIX) 40 MG tablet Take 40 mg by mouth 2 (two) times daily.   Yes Historical Provider   gabapentin (NEURONTIN) 300 MG capsule Take 300 mg by mouth 2 (two) times daily. 9/13/21  Yes Historical Provider  "  hydrocodone-acetaminophen 10-325mg (NORCO)  mg Tab Take 1 tablet by mouth every 6 (six) hours as needed for Pain.   Yes Historical Provider   isosorbide mononitrate (IMDUR) 30 MG 24 hr tablet Take 1 tablet (30 mg total) by mouth once daily. 11/18/21 12/18/21 Yes Tessa Miranda MD   losartan (COZAAR) 25 MG tablet Take 1 tablet (25 mg total) by mouth once daily. 11/18/21 12/18/21 Yes Tessa Miranda MD   metoprolol succinate (TOPROL-XL) 25 MG 24 hr tablet Take 25 mg by mouth once daily. 12/28/21  Yes Historical Provider   multivitamin Tab Take 1 tablet by mouth once daily. 9/11/20  Yes Shiela Lynn MD   pantoprazole (PROTONIX) 40 MG tablet Take 1 tablet (40 mg total) by mouth once daily. 11/18/21 12/18/21 Yes Tessa Miranda MD   spironolactone (ALDACTONE) 50 MG tablet Take 50 mg by mouth once daily. 1/28/22  Yes Historical Provider   zolpidem (AMBIEN) 10 mg Tab Take 1 tablet by mouth every evening. 12/29/21  Yes Historical Provider   albuterol (VENTOLIN HFA) 90 mcg/actuation inhaler Inhale 2 puffs into the lungs every 4 (four) hours as needed for Wheezing or Shortness of Breath. Rescue 11/17/21 12/1/21  Tessa Miranda MD   cloNIDine 0.2 mg/24 hr td ptwk (CATAPRES) 0.2 mg/24 hr Place 1 patch onto the skin every 7 days. THURSDAYS    Historical Provider   cyclobenzaprine (FLEXERIL) 10 MG tablet Take 1 tablet by mouth 2 (two) times a day. 12/1/21   Historical Provider   ergocalciferol (ERGOCALCIFEROL) 50,000 unit Cap Take 50,000 Units by mouth every 7 days. THURSDAYS 1/2/22   Historical Provider   estradioL (ESTRACE) 0.01 % (0.1 mg/gram) vaginal cream  10/25/21   Historical Provider   metFORMIN (GLUCOPHAGE) 500 MG tablet Take 500 mg by mouth 2 (two) times daily. 1/28/22   Historical Provider   metoprolol tartrate (LOPRESSOR) 25 MG tablet Take 25 mg by mouth.    Historical Provider         PHYSICAL EXAM:   BP (!) 145/93   Pulse 81   Temp 98 °F (36.7 °C) (Oral)   Resp (!) 24   Ht 5' 7" (1.702 m)  "  Wt (!) 198.4 kg (437 lb 4.8 oz)   LMP 11/21/2013   SpO2 (!) 94%   Breastfeeding No   BMI 68.49 kg/m²   Vitals Reviewed  General appearance: Well-developed, morbidly obese disheveled  female   Skin: No Rash.  Warm, dry   Neuro: AAOx3. Follows commands. Motor and sensory exams grossly intact. Good tone. Power in all 4 extremities 5/5.   HENT: Atraumatic head. Moist mucous membranes of oral cavity.  Eyes: Normal extraocular movements. PERRLA  Neck: Supple. No evidence of lymphadenopathy. No thyroidomegaly.  Lungs: Diminished lung sounds bilaterally. No wheezing present.   Heart: Regular rate and rhythm. S1 and S2 present with no murmurs/gallop/rub. No pedal edema. No JVD present.   Abdomen: Soft, round, non-tender. No rebound tenderness/guarding. No masses or organomegaly. Bowel sounds are normal. Bladder is not palpable.   Extremities: No cyanosis, clubbing. Capillary refill less than 2 seconds.   Psych/mental status: Alert and oriented. Cooperative. Responds appropriately to questions.   EMERGENCY DEPARTMENT LABS AND IMAGING:     Labs Reviewed   CBC W/ AUTO DIFFERENTIAL - Abnormal; Notable for the following components:       Result Value    MCHC 31.2 (*)     Gran # (ANC) 9.7 (*)     Immature Grans (Abs) 0.06 (*)     Gran % 79.9 (*)     Lymph % 13.3 (*)     All other components within normal limits   COMPREHENSIVE METABOLIC PANEL - Abnormal; Notable for the following components:    Sodium 135 (*)     Glucose 121 (*)     Total Bilirubin 2.2 (*)     All other components within normal limits   BASIC METABOLIC PANEL - Abnormal; Notable for the following components:    Sodium 135 (*)     CO2 31 (*)     Glucose 140 (*)     All other components within normal limits   CBC WITHOUT DIFFERENTIAL - Abnormal; Notable for the following components:    MCHC 29.9 (*)     All other components within normal limits   ISTAT PROCEDURE - Abnormal; Notable for the following components:    POC PH 7.282 (*)     POC PCO2  72.3 (*)     POC HCO3 34.1 (*)     POC TCO2 36 (*)     All other components within normal limits   ISTAT PROCEDURE - Abnormal; Notable for the following components:    POC PH 7.310 (*)     POC PCO2 71.8 (*)     POC PO2 67 (*)     POC HCO3 36.2 (*)     POC SATURATED O2 90 (*)     POC TCO2 38 (*)     All other components within normal limits   GROUP A STREP, MOLECULAR   CULTURE, BLOOD   CULTURE, BLOOD   THROAT SCREEN, RAPID STREP   TROPONIN I   B-TYPE NATRIURETIC PEPTIDE   SARS-COV-2 RNA AMPLIFICATION, QUAL   D DIMER, QUANTITATIVE   TSH   TROPONIN I   LACTIC ACID, PLASMA       CT Soft Tissue Neck With Contrast   Final Result      CT Soft Tissue Neck With Contrast   Final Result      X-Ray Chest AP Portable   Final Result          ASSESSMENT & PLAN:   Maribel Hernandez is a 41 y.o. female admitted for    Active problems/reason for admission  Retropharyngeal phlegmon  Chest pain  Acute respiratory failure with hypercapnia      Chronic problems  Obesity  Anxiety/depression  Hypertension  COLIN  Heart attack-did not require stent  Hyperlipidemia  Bronchitis    Plan  Admit to ICU on BiPAP  Continue IV Zosyn/vanc  With neb treatments scheduled and p.r.n.  Repeat ABG  Patient tolerating mechanical soft diet  Continue home medications as ordered for chronic conditions  The plan at this time is to still transfer the patient when a bed is available      Diet:  Mechanical soft/Cardiac    DVT Prophylaxis:  Mechanical DVT prophylaxis. Encourage ambulation and OOB as tolerated.     Discharge Planning and Disposition:  Patient will be discharged in 2-3 days  ________________________________________________________________________________    This patient is high risk for life-threatening deterioration and death secondary to above comorbidities and need for IV treatment. This patient meets inpatient criteria.   Face-to-Face encounter date: 02/04/2022  Encounter included review of the medical records, interviewing and examining the  patient face-to-face, discussion with family and other health care providers including emergency medicine physician, admission orders, interpreting lab/test results and formulating a plan of care.   Medical Decision Making during this encounter was  [_] Low Complexity  [_] Moderate Complexity  [x] High Complexity    Critical care time:  52 minutes  _________________________________________________________________________________    INPATIENT LIST OF MEDICATIONS     Current Facility-Administered Medications:     albuterol nebulizer solution 2.5 mg, 2.5 mg, Nebulization, Q6H, Luis Rodgers MD, 2.5 mg at 02/04/22 2040    budesonide nebulizer solution 0.5 mg, 0.5 mg, Nebulization, BID, Luis Rodgers MD, 0.5 mg at 02/04/22 2039    FLUoxetine capsule 40 mg, 40 mg, Oral, Daily, Genaro Whitehead MD, 40 mg at 02/04/22 1047    furosemide tablet 40 mg, 40 mg, Oral, Daily, Genaro Whitehead MD, 40 mg at 02/04/22 0916    gabapentin capsule 300 mg, 300 mg, Oral, BID, Genaro Whitehead MD, 300 mg at 02/04/22 0916    piperacillin-tazobactam 4.5 g in dextrose 5 % 100 mL IVPB (ready to mix system), 4.5 g, Intravenous, Q8H, Genaro Whitehead MD, Stopped at 02/04/22 1900    spironolactone tablet 50 mg, 50 mg, Oral, Daily, Genaro Whitehead MD, 50 mg at 02/04/22 1033    Pharmacy to dose Vancomycin consult, , , Once **AND** vancomycin - pharmacy to dose, , Intravenous, pharmacy to manage frequency, Genaro Whitehead MD    vancomycin in dextrose 5 % 1 gram/250 mL IVPB 1,000 mg, 1,000 mg, Intravenous, Once **FOLLOWED BY** [COMPLETED] vancomycin in dextrose 5 % 1 gram/250 mL IVPB 1,000 mg, 1,000 mg, Intravenous, Once, Genaro Whitehead MD, Stopped at 02/03/22 6925    Current Outpatient Medications:     ADVAIR DISKUS 100-50 mcg/dose diskus inhaler, Inhale 1 puff into the lungs every 4 to 6 hours as needed., Disp: , Rfl:     albuterol-ipratropium (DUO-NEB) 2.5 mg-0.5 mg/3 mL nebulizer solution, Take 3 mLs by nebulization every 6 (six) hours  while awake., Disp: , Rfl:     alprazolam (XANAX) 2 MG Tab, Take 2 mg by mouth 3 (three) times daily as needed (anxiety). , Disp: , Rfl:     aspirin 81 MG Chew, Take 1 tablet (81 mg total) by mouth once daily., Disp: 30 tablet, Rfl: 0    atorvastatin (LIPITOR) 80 MG tablet, Take 1 tablet (80 mg total) by mouth every evening., Disp: 30 tablet, Rfl: 0    diltiaZEM (TIAZAC) 360 MG Cs24, Take 360 mg by mouth once daily., Disp: , Rfl:     fluoxetine (PROZAC) 40 MG capsule, Take 40 mg by mouth once daily., Disp: , Rfl:     furosemide (LASIX) 40 MG tablet, Take 40 mg by mouth 2 (two) times daily., Disp: , Rfl:     gabapentin (NEURONTIN) 300 MG capsule, Take 300 mg by mouth 2 (two) times daily., Disp: , Rfl:     hydrocodone-acetaminophen 10-325mg (NORCO)  mg Tab, Take 1 tablet by mouth every 6 (six) hours as needed for Pain., Disp: , Rfl:     isosorbide mononitrate (IMDUR) 30 MG 24 hr tablet, Take 1 tablet (30 mg total) by mouth once daily., Disp: 30 tablet, Rfl: 0    losartan (COZAAR) 25 MG tablet, Take 1 tablet (25 mg total) by mouth once daily., Disp: 30 tablet, Rfl: 0    metoprolol succinate (TOPROL-XL) 25 MG 24 hr tablet, Take 25 mg by mouth once daily., Disp: , Rfl:     multivitamin Tab, Take 1 tablet by mouth once daily., Disp:  , Rfl:     pantoprazole (PROTONIX) 40 MG tablet, Take 1 tablet (40 mg total) by mouth once daily., Disp: 30 tablet, Rfl: 0    spironolactone (ALDACTONE) 50 MG tablet, Take 50 mg by mouth once daily., Disp: , Rfl:     zolpidem (AMBIEN) 10 mg Tab, Take 1 tablet by mouth every evening., Disp: , Rfl:     albuterol (VENTOLIN HFA) 90 mcg/actuation inhaler, Inhale 2 puffs into the lungs every 4 (four) hours as needed for Wheezing or Shortness of Breath. Rescue, Disp: 18 g, Rfl: 0    cloNIDine 0.2 mg/24 hr td ptwk (CATAPRES) 0.2 mg/24 hr, Place 1 patch onto the skin every 7 days. THURSDAYS, Disp: , Rfl:     cyclobenzaprine (FLEXERIL) 10 MG tablet, Take 1 tablet by mouth 2  (two) times a day., Disp: , Rfl:     ergocalciferol (ERGOCALCIFEROL) 50,000 unit Cap, Take 50,000 Units by mouth every 7 days. THURSDAYS, Disp: , Rfl:     estradioL (ESTRACE) 0.01 % (0.1 mg/gram) vaginal cream, , Disp: , Rfl:     metFORMIN (GLUCOPHAGE) 500 MG tablet, Take 500 mg by mouth 2 (two) times daily., Disp: , Rfl:     metoprolol tartrate (LOPRESSOR) 25 MG tablet, Take 25 mg by mouth., Disp: , Rfl:       Scheduled Meds:   albuterol sulfate  2.5 mg Nebulization Q6H    budesonide  0.5 mg Nebulization BID    FLUoxetine  40 mg Oral Daily    furosemide  40 mg Oral Daily    gabapentin  300 mg Oral BID    piperacillin-tazobactam (ZOSYN) IVPB  4.5 g Intravenous Q8H    spironolactone  50 mg Oral Daily    vancomycin (VANCOCIN) IVPB  1,000 mg Intravenous Once     Continuous Infusions:  PRN Meds:.      Tahira Ruvalcaba  [facility] Hospitalist  02/04/2022

## 2022-02-06 LAB
ALBUMIN SERPL BCP-MCNC: 3.1 G/DL (ref 3.5–5.2)
ALP SERPL-CCNC: 118 U/L (ref 55–135)
ALT SERPL W/O P-5'-P-CCNC: 27 U/L (ref 10–44)
ANION GAP SERPL CALC-SCNC: 7 MMOL/L (ref 8–16)
AST SERPL-CCNC: 16 U/L (ref 10–40)
BACTERIA BLD CULT: ABNORMAL
BASOPHILS # BLD AUTO: 0.06 K/UL (ref 0–0.2)
BASOPHILS NFR BLD: 0.7 % (ref 0–1.9)
BILIRUB SERPL-MCNC: 1.1 MG/DL (ref 0.1–1)
BUN SERPL-MCNC: 10 MG/DL (ref 6–20)
CALCIUM SERPL-MCNC: 9.5 MG/DL (ref 8.7–10.5)
CHLORIDE SERPL-SCNC: 101 MMOL/L (ref 95–110)
CO2 SERPL-SCNC: 32 MMOL/L (ref 23–29)
CREAT SERPL-MCNC: 0.8 MG/DL (ref 0.5–1.4)
DIFFERENTIAL METHOD: ABNORMAL
EOSINOPHIL # BLD AUTO: 0.2 K/UL (ref 0–0.5)
EOSINOPHIL NFR BLD: 2.3 % (ref 0–8)
ERYTHROCYTE [DISTWIDTH] IN BLOOD BY AUTOMATED COUNT: 12.9 % (ref 11.5–14.5)
EST. GFR  (AFRICAN AMERICAN): >60 ML/MIN/1.73 M^2
EST. GFR  (NON AFRICAN AMERICAN): >60 ML/MIN/1.73 M^2
GLUCOSE SERPL-MCNC: 127 MG/DL (ref 70–110)
HCT VFR BLD AUTO: 38.8 % (ref 37–48.5)
HGB BLD-MCNC: 12 G/DL (ref 12–16)
IMM GRANULOCYTES # BLD AUTO: 0.03 K/UL (ref 0–0.04)
IMM GRANULOCYTES NFR BLD AUTO: 0.3 % (ref 0–0.5)
LYMPHOCYTES # BLD AUTO: 2.1 K/UL (ref 1–4.8)
LYMPHOCYTES NFR BLD: 23.8 % (ref 18–48)
MCH RBC QN AUTO: 28.7 PG (ref 27–31)
MCHC RBC AUTO-ENTMCNC: 30.9 G/DL (ref 32–36)
MCV RBC AUTO: 93 FL (ref 82–98)
MONOCYTES # BLD AUTO: 0.5 K/UL (ref 0.3–1)
MONOCYTES NFR BLD: 5.8 % (ref 4–15)
NEUTROPHILS # BLD AUTO: 5.9 K/UL (ref 1.8–7.7)
NEUTROPHILS NFR BLD: 67.1 % (ref 38–73)
NRBC BLD-RTO: 0 /100 WBC
PLATELET # BLD AUTO: 375 K/UL (ref 150–450)
PMV BLD AUTO: 10.1 FL (ref 9.2–12.9)
POTASSIUM SERPL-SCNC: 3.6 MMOL/L (ref 3.5–5.1)
PROT SERPL-MCNC: 7.2 G/DL (ref 6–8.4)
RBC # BLD AUTO: 4.18 M/UL (ref 4–5.4)
SODIUM SERPL-SCNC: 140 MMOL/L (ref 136–145)
WBC # BLD AUTO: 8.83 K/UL (ref 3.9–12.7)

## 2022-02-06 PROCEDURE — 99900035 HC TECH TIME PER 15 MIN (STAT)

## 2022-02-06 PROCEDURE — 63600175 PHARM REV CODE 636 W HCPCS: Performed by: HOSPITALIST

## 2022-02-06 PROCEDURE — 80053 COMPREHEN METABOLIC PANEL: CPT | Performed by: HOSPITALIST

## 2022-02-06 PROCEDURE — 99232 SBSQ HOSP IP/OBS MODERATE 35: CPT | Mod: ,,, | Performed by: STUDENT IN AN ORGANIZED HEALTH CARE EDUCATION/TRAINING PROGRAM

## 2022-02-06 PROCEDURE — 94640 AIRWAY INHALATION TREATMENT: CPT

## 2022-02-06 PROCEDURE — 97161 PT EVAL LOW COMPLEX 20 MIN: CPT

## 2022-02-06 PROCEDURE — 36415 COLL VENOUS BLD VENIPUNCTURE: CPT | Performed by: HOSPITALIST

## 2022-02-06 PROCEDURE — 27000221 HC OXYGEN, UP TO 24 HOURS

## 2022-02-06 PROCEDURE — 63600175 PHARM REV CODE 636 W HCPCS: Performed by: STUDENT IN AN ORGANIZED HEALTH CARE EDUCATION/TRAINING PROGRAM

## 2022-02-06 PROCEDURE — 25000003 PHARM REV CODE 250: Performed by: HOSPITALIST

## 2022-02-06 PROCEDURE — 94761 N-INVAS EAR/PLS OXIMETRY MLT: CPT

## 2022-02-06 PROCEDURE — 25000242 PHARM REV CODE 250 ALT 637 W/ HCPCS: Performed by: HOSPITALIST

## 2022-02-06 PROCEDURE — 85025 COMPLETE CBC W/AUTO DIFF WBC: CPT | Performed by: HOSPITALIST

## 2022-02-06 PROCEDURE — 20600001 HC STEP DOWN PRIVATE ROOM

## 2022-02-06 PROCEDURE — 99232 PR SUBSEQUENT HOSPITAL CARE,LEVL II: ICD-10-PCS | Mod: ,,, | Performed by: STUDENT IN AN ORGANIZED HEALTH CARE EDUCATION/TRAINING PROGRAM

## 2022-02-06 PROCEDURE — 25000003 PHARM REV CODE 250: Performed by: STUDENT IN AN ORGANIZED HEALTH CARE EDUCATION/TRAINING PROGRAM

## 2022-02-06 RX ORDER — PREDNISONE 5 MG/1
10 TABLET ORAL DAILY
Status: DISCONTINUED | OUTPATIENT
Start: 2022-02-10 | End: 2022-02-07 | Stop reason: HOSPADM

## 2022-02-06 RX ORDER — PREDNISONE 20 MG/1
20 TABLET ORAL DAILY
Status: DISCONTINUED | OUTPATIENT
Start: 2022-02-08 | End: 2022-02-07 | Stop reason: HOSPADM

## 2022-02-06 RX ORDER — PREDNISONE 20 MG/1
40 TABLET ORAL DAILY
Status: COMPLETED | OUTPATIENT
Start: 2022-02-06 | End: 2022-02-07

## 2022-02-06 RX ORDER — PENICILLIN V POTASSIUM 500 MG/1
500 TABLET, FILM COATED ORAL EVERY 8 HOURS
Status: DISCONTINUED | OUTPATIENT
Start: 2022-02-06 | End: 2022-02-06

## 2022-02-06 RX ORDER — AMOXICILLIN AND CLAVULANATE POTASSIUM 875; 125 MG/1; MG/1
1 TABLET, FILM COATED ORAL EVERY 12 HOURS
Status: DISCONTINUED | OUTPATIENT
Start: 2022-02-06 | End: 2022-02-07 | Stop reason: HOSPADM

## 2022-02-06 RX ADMIN — ASPIRIN 81 MG CHEWABLE TABLET 81 MG: 81 TABLET CHEWABLE at 08:02

## 2022-02-06 RX ADMIN — PIPERACILLIN AND TAZOBACTAM 4.5 G: 4; .5 INJECTION, POWDER, LYOPHILIZED, FOR SOLUTION INTRAVENOUS; PARENTERAL at 07:02

## 2022-02-06 RX ADMIN — LOSARTAN POTASSIUM 25 MG: 25 TABLET, FILM COATED ORAL at 08:02

## 2022-02-06 RX ADMIN — PANTOPRAZOLE SODIUM 40 MG: 40 TABLET, DELAYED RELEASE ORAL at 08:02

## 2022-02-06 RX ADMIN — ENOXAPARIN SODIUM 40 MG: 100 INJECTION SUBCUTANEOUS at 08:02

## 2022-02-06 RX ADMIN — FLUOXETINE 40 MG: 20 CAPSULE ORAL at 08:02

## 2022-02-06 RX ADMIN — AMOXICILLIN AND CLAVULANATE POTASSIUM 1 TABLET: 875; 125 TABLET, FILM COATED ORAL at 08:02

## 2022-02-06 RX ADMIN — PREDNISONE 40 MG: 20 TABLET ORAL at 03:02

## 2022-02-06 RX ADMIN — FUROSEMIDE 40 MG: 40 TABLET ORAL at 08:02

## 2022-02-06 RX ADMIN — ACETAMINOPHEN 650 MG: 325 TABLET ORAL at 08:02

## 2022-02-06 RX ADMIN — SPIRONOLACTONE 50 MG: 25 TABLET ORAL at 08:02

## 2022-02-06 RX ADMIN — FLUTICASONE FUROATE AND VILANTEROL TRIFENATATE 1 PUFF: 100; 25 POWDER RESPIRATORY (INHALATION) at 07:02

## 2022-02-06 RX ADMIN — ATORVASTATIN CALCIUM 80 MG: 20 TABLET, FILM COATED ORAL at 08:02

## 2022-02-06 RX ADMIN — ISOSORBIDE MONONITRATE 30 MG: 30 TABLET, EXTENDED RELEASE ORAL at 08:02

## 2022-02-06 RX ADMIN — METOPROLOL SUCCINATE 25 MG: 25 TABLET, EXTENDED RELEASE ORAL at 08:02

## 2022-02-06 NOTE — HPI
41F with history of HTN presents with 3 days of sore throat. Reports that on 1/2 she woke up and had bad sore throat. She also felt as if her throat was closing up and she was having trouble breathing at that time. She reports a dry cough as well. No fevers. She went to OSH where she was admitted for IV antibiotics and steroids. She had a CT scan on admission that showed a possible retropharyngeal fluid collection. Repeat CT yesterday with interval improvement. Today, she states she feels slightly improved and can swallow. She did have otalgia that has resolved. Denies difficulty breathing at this time.

## 2022-02-06 NOTE — ASSESSMENT & PLAN NOTE
COLIN/OHS  -Severe morbid obesity and COLIN/OHS contributing to episode of acute hypercapneic respiratory failure Pt. Removed from continuous Bipap early this monring, continue Bipap QHS and monitor respiratory sattus and mentation closely  -Low threshold to repeat ABG if pt. Shows sings of respiratory failure/AMS  -Pt. Without home Bipap/CPAP machine, SW consult placed to asssit with possibly obtaining prior to discharge  - qhs BiPAP 22/12  - Improved

## 2022-02-06 NOTE — H&P
Hospital Medicine  History and Physical Exam    Team: [facility] MED  Jori Viveros MD  Admit Date: 2/5/2022  Principal Problem:  Acute hypercapnic respiratory failure   Patient information was obtained from patient, past medical records and ER records.   Primary care Physician: Graciela Mercer NP  Code status: Full Code    HPI: 40 yo F with PMHx morbid obesity with COLIN/OHS, HTN, and HLD who presented to [facility] 2/2/22 complaining of sore throat and difficulty swallowing. Pt. Reported associated tenderness to her anterior neck. CT evaluation of soft tissue neck reveals moderate swelling and inflammation consistent with retropharyngeal phlegmon/cellulitis there is no sign of abscess. Patient did not have any stridor and was tolerating secretions.. Patient treated with IV vanc+zosyn (through today) and solumedrol 125 mg x1. Pt. Stay was complicated by acute hyper capneic respiratory failure. The patient reportedly became lethargic yesterday evening. ABG revealed pH 7.282, pCO2 72.3. Pt. Was placed on continuous bipap with improvement in mentation and repeat ABG on morning of transfer was 7.37. At time of my interview, pt. Is resting comfortably on 1L NC. She reports improvement in her sore throat but she still has some residual tenderness. No reported SOB, cough, fevers, chills, nausea, or vomiting.    A repeat CT 2/4 showed Mild residual retropharyngeal cellulitis, improved from prior, with no evidence of abscess.    No results found for: HGBA1C    Past Medical History: Patient has a past medical history of Anxiety, COVID-19, Depression, Heart attack, Hyperlipidemia, Hypertension, Obesity, and Obesity.    Past Surgical History: Patient has a past surgical history that includes Cholecystectomy and Hysterectomy.    Social History: Patient reports that she has never smoked. She does not have any smokeless tobacco history on file. She reports that she does not drink alcohol.    Family History:  family history includes No Known Problems in her father and mother.     Medications: reviewed     Allergies: Patient has No Known Allergies.    ROS  Pain Scale: 2/10   Constitutional: no fever or chills  Respiratory: no cough or shortness of breath, Positive for sore throat/tenderness  Cardiovascular: no chest pain or palpitations  Gastrointestinal: no nausea or vomiting, no abdominal pain or change in bowel habits  Genitourinary: no hematuria or dysuria  Integument/Breast: no rash or pruritis  Hematologic/Lymphatic: no easy bruising or lymphadenopathy  Musculoskeletal: no arthralgias or myalgias  Neurological: no seizures or tremors  Behavioral/Psych: no depression or anxiety    PEx  Temp:  [97.7 °F (36.5 °C)-99.1 °F (37.3 °C)]   Pulse:  [67-97]   Resp:  [8-27]   BP: ()/(44-96)   SpO2:  [90 %-97 %]   Body mass index is 68.47 kg/m².   No intake or output data in the 24 hours ending 02/05/22 2016    General appearance: no distress, pt. Resting comfortably  Mental status: Alert and oriented x 3  HEENT:  conjunctivae/corneas clear, PERRL  Neck: supple, thyroid not enlarged  Pulm:   normal respiratory effort, CTA B, no c/w/r  Card: RRR, S1, S2 normal, no murmur, click, rub or gallop  Abd: soft, NT, ND, BS present; no masses, no organomegaly  Ext: no c/c/e  Pulses: 2+, symmetric  Skin: color, texture, turgor normal. No rashes or lesions  Neuro: CN II-XII grossly intact, no focal numbness or weakness, normal strength and tone     Recent Results (from the past 24 hour(s))   ISTAT PROCEDURE    Collection Time: 02/04/22 10:36 PM   Result Value Ref Range    POC PH 7.310 (L) 7.35 - 7.45    POC PCO2 71.8 (HH) 35 - 45 mmHg    POC PO2 67 (L) 80 - 100 mmHg    POC HCO3 36.2 (H) 24 - 28 mmol/L    POC BE 10 -2 to 2 mmol/L    POC SATURATED O2 90 (L) 95 - 100 %    POC TCO2 38 (H) 23 - 27 mmol/L    Sample ARTERIAL     Site LR     Allens Test Pass     DelSys CPAP/BiPAP     Mode BiPAP     FiO2 24     Spont Rate 24     Min Vol 11      Sp02 94     IP 22     EP 12    ISTAT PROCEDURE    Collection Time: 02/05/22  7:00 AM   Result Value Ref Range    POC PH 7.374 7.35 - 7.45    POC PCO2 61.7 (HH) 35 - 45 mmHg    POC PO2 79 (L) 80 - 100 mmHg    POC HCO3 36.0 (H) 24 - 28 mmol/L    POC BE 11 -2 to 2 mmol/L    POC SATURATED O2 95 95 - 100 %    POC Glucose 153 (H) 70 - 110 mg/dL    POC Sodium 138 136 - 145 mmol/L    POC Potassium 3.8 3.5 - 5.1 mmol/L    POC TCO2 38 (H) 23 - 27 mmol/L    POC Ionized Calcium 1.21 1.06 - 1.42 mmol/L    POC Hematocrit 40 36 - 54 %PCV    Rate 24     Sample ARTERIAL     Site RB     Allens Test Pass     DelSys CPAP/BiPAP     Mode BiPAP     FiO2 24     IP 24     EP 12    Basic Metabolic Panel    Collection Time: 02/05/22  7:39 AM   Result Value Ref Range    Sodium 138 136 - 145 mmol/L    Potassium 3.8 3.5 - 5.1 mmol/L    Chloride 97 95 - 110 mmol/L    CO2 31 (H) 23 - 29 mmol/L    Glucose 158 (H) 70 - 110 mg/dL    BUN 10 6 - 20 mg/dL    Creatinine 1.0 0.5 - 1.4 mg/dL    Calcium 8.9 8.7 - 10.5 mg/dL    Anion Gap 10 8 - 16 mmol/L    eGFR if African American >60.0 >60 mL/min/1.73 m^2    eGFR if non African American >60.0 >60 mL/min/1.73 m^2   CBC Without Differential    Collection Time: 02/05/22  7:39 AM   Result Value Ref Range    WBC 8.42 3.90 - 12.70 K/uL    RBC 4.35 4.00 - 5.40 M/uL    Hemoglobin 12.1 12.0 - 16.0 g/dL    Hematocrit 41.0 37.0 - 48.5 %    MCV 94 82 - 98 fL    MCH 27.8 27.0 - 31.0 pg    MCHC 29.5 (L) 32.0 - 36.0 g/dL    RDW 13.0 11.5 - 14.5 %    Platelets 383 150 - 450 K/uL    MPV 10.0 9.2 - 12.9 fL       No results for input(s): POCTGLUCOSE in the last 168 hours.    Active Hospital Problems    Diagnosis  POA    Pharyngitis [J02.9]  Unknown      Resolved Hospital Problems   No resolved problems to display.       Assessment and Plan:  Acute Hypercapnic Respiratory Failure  COLIN/OHS  -Severe morbid obesity and COLIN/OHS contributing to episode of acute hypercapneic respiratory failure Pt. Removed from continuous Bipap  early this monring, continue Bipap QHS and monitor respiratory sattus and mentation closely  -Low threshold to repeat ABG if pt. Shows sings of respiratory failure/AMS  -Pt. Without home Bipap/CPAP machine, SW consult placed to asssit with possibly obtaining prior to discharge    Retropharyngeal Cellulitis  -On vanc/zosyn, pt. Improving per imaging  -ENT consulted, no intervention recommended   -Coag negative staph was noted on blood cultures from 2/2, suspect contaminant but will repeat  -Will deescalate to zosyn monotherapy in anticipation of being able to discharge on PO augmentin, continue to monitor patient for clinical improvement.    HTN  -Continue home losartan, imdur, aldactone, diltiazem, clonidine patch, toprol-XL and lasix    HLD  -Continue home statin    DVT PPx: Lovenox    Jori Viveros MD  Hospital Medicine Staff  [phone] pager

## 2022-02-06 NOTE — HPI
40 yo F with PMHx morbid obesity with COLIN/OHS, HTN, and HLD who presented to [facility] 2/2/22 complaining of sore throat and difficulty swallowing. Pt. Reported associated tenderness to her anterior neck. CT evaluation of soft tissue neck reveals moderate swelling and inflammation consistent with retropharyngeal phlegmon/cellulitis there is no sign of abscess. Patient did not have any stridor and was tolerating secretions.. Patient treated with IV vanc+zosyn (through today) and solumedrol 125 mg x1. Pt. Stay was complicated by acute hyper capneic respiratory failure. The patient reportedly became lethargic yesterday evening. ABG revealed pH 7.282, pCO2 72.3. Pt. Was placed on continuous bipap with improvement in mentation and repeat ABG on morning of transfer was 7.37. At time of my interview, pt. Is resting comfortably on 1L NC. She reports improvement in her sore throat but she still has some residual tenderness. No reported SOB, cough, fevers, chills, nausea, or vomiting.     A repeat CT 2/4 showed Mild residual retropharyngeal cellulitis, improved from prior, with no evidence of abscess.

## 2022-02-06 NOTE — CONSULTS
[facility] - [facility]  Otorhinolaryngology-Head & Neck Surgery  Consult Note    Patient Name: Maribel Hernandez  MRN: [id number]  Code Status: Prior  Admission Date: 2/5/2022  Hospital Length of Stay: 0 days  Attending Physician: Sanjay Mcgill MD  Primary Care Provider: Graciela Mercer NP    Patient information was obtained from patient, past medical records and ER records.     Consults  Subjective:     Chief Complaint/Reason for Admission: sore throat    History of Present Illness: 41F with history of HTN presents with 3 days of sore throat. Reports that on 1/2 she woke up and had bad sore throat. She also felt as if her throat was closing up and she was having trouble breathing at that time. She reports a dry cough as well. No fevers. She went to OSH where she was admitted for IV antibiotics and steroids. She had a CT scan on admission that showed a possible retropharyngeal fluid collection. Repeat CT yesterday with interval improvement. Today, she states she feels slightly improved and can swallow. She did have otalgia that has resolved. Denies difficulty breathing at this time.       Medications:  Continuous Infusions:  Scheduled Meds:  PRN Meds:     Current Facility-Administered Medications on File Prior to Encounter   Medication    [COMPLETED] iohexoL (OMNIPAQUE 350) injection 100 mL    [COMPLETED] LIDOcaine (PF) 10 mg/ml (1%) injection 50 mg    [DISCONTINUED] acetaminophen tablet 650 mg    [DISCONTINUED] albuterol nebulizer solution 2.5 mg    [DISCONTINUED] albuterol nebulizer solution 2.5 mg    [DISCONTINUED] albuterol-ipratropium 2.5 mg-0.5 mg/3 mL nebulizer solution 3 mL    [DISCONTINUED] aspirin chewable tablet 81 mg    [DISCONTINUED] atorvastatin tablet 80 mg    [DISCONTINUED] budesonide nebulizer solution 0.5 mg    [DISCONTINUED] cloNIDine 0.2 mg/24 hr td ptwk 1 patch    [DISCONTINUED] diltiaZEM 24 hr capsule 360 mg    [DISCONTINUED] FLUoxetine capsule 40 mg    [DISCONTINUED]  furosemide tablet 40 mg    [DISCONTINUED] gabapentin capsule 300 mg    [DISCONTINUED] isosorbide mononitrate 24 hr tablet 30 mg    [DISCONTINUED] losartan tablet 25 mg    [DISCONTINUED] melatonin tablet 6 mg    [DISCONTINUED] metoprolol succinate (TOPROL-XL) 24 hr tablet 25 mg    [DISCONTINUED] multivitamin tablet    [DISCONTINUED] ondansetron injection 4 mg    [DISCONTINUED] pantoprazole EC tablet 40 mg    [DISCONTINUED] piperacillin-tazobactam 4.5 g in dextrose 5 % 100 mL IVPB (ready to mix system)    [DISCONTINUED] sodium chloride 0.9% flush 10 mL    [DISCONTINUED] spironolactone tablet 50 mg    [DISCONTINUED] vancomycin (VANCOCIN) 2,000 mg in dextrose 5 % 500 mL IVPB    [DISCONTINUED] vancomycin - pharmacy to dose    [DISCONTINUED] vancomycin - pharmacy to dose    [DISCONTINUED] vancomycin in dextrose 5 % 1 gram/250 mL IVPB 1,000 mg     Current Outpatient Medications on File Prior to Encounter   Medication Sig    ADVAIR DISKUS 100-50 mcg/dose diskus inhaler Inhale 1 puff into the lungs every 4 to 6 hours as needed.    albuterol (VENTOLIN HFA) 90 mcg/actuation inhaler Inhale 2 puffs into the lungs every 4 (four) hours as needed for Wheezing or Shortness of Breath. Rescue    albuterol-ipratropium (DUO-NEB) 2.5 mg-0.5 mg/3 mL nebulizer solution Take 3 mLs by nebulization every 6 (six) hours while awake.    aspirin 81 MG Chew Take 1 tablet (81 mg total) by mouth once daily.    atorvastatin (LIPITOR) 80 MG tablet Take 1 tablet (80 mg total) by mouth every evening.    budesonide (PULMICORT) 0.5 mg/2 mL nebulizer solution Take 2 mLs (0.5 mg total) by nebulization 2 (two) times daily. Controller    cloNIDine 0.2 mg/24 hr td ptwk (CATAPRES) 0.2 mg/24 hr Place 1 patch onto the skin every 7 days. THURSDAYS    cyclobenzaprine (FLEXERIL) 10 MG tablet Take 1 tablet by mouth 2 (two) times a day.    diltiaZEM (TIAZAC) 360 MG Cs24 Take 360 mg by mouth once daily.    ergocalciferol (ERGOCALCIFEROL) 50,000  unit Cap Take 50,000 Units by mouth every 7 days. THURSDAYS    fluoxetine (PROZAC) 40 MG capsule Take 40 mg by mouth once daily.    furosemide (LASIX) 40 MG tablet Take 40 mg by mouth 2 (two) times daily.    gabapentin (NEURONTIN) 300 MG capsule Take 300 mg by mouth 2 (two) times daily.    isosorbide mononitrate (IMDUR) 30 MG 24 hr tablet Take 1 tablet (30 mg total) by mouth once daily.    metoprolol succinate (TOPROL-XL) 25 MG 24 hr tablet Take 25 mg by mouth once daily.    multivitamin Tab Take 1 tablet by mouth once daily.    pantoprazole (PROTONIX) 40 MG tablet Take 1 tablet (40 mg total) by mouth once daily.    spironolactone (ALDACTONE) 50 MG tablet Take 50 mg by mouth once daily.    [DISCONTINUED] alprazolam (XANAX) 2 MG Tab Take 2 mg by mouth 3 (three) times daily as needed (anxiety).     [DISCONTINUED] estradioL (ESTRACE) 0.01 % (0.1 mg/gram) vaginal cream     [DISCONTINUED] hydrocodone-acetaminophen 10-325mg (NORCO)  mg Tab Take 1 tablet by mouth every 6 (six) hours as needed for Pain.    [DISCONTINUED] losartan (COZAAR) 25 MG tablet Take 1 tablet (25 mg total) by mouth once daily.    [DISCONTINUED] metFORMIN (GLUCOPHAGE) 500 MG tablet Take 500 mg by mouth 2 (two) times daily.    [DISCONTINUED] metoprolol tartrate (LOPRESSOR) 25 MG tablet Take 25 mg by mouth.    [DISCONTINUED] zolpidem (AMBIEN) 10 mg Tab Take 1 tablet by mouth every evening.       Review of patient's allergies indicates:  No Known Allergies    Past Medical History:   Diagnosis Date    Anxiety     COVID-19     Depression     Heart attack     Hyperlipidemia     Hypertension     Obesity     Obesity      Past Surgical History:   Procedure Laterality Date    CHOLECYSTECTOMY      HYSTERECTOMY       Family History     Problem Relation (Age of Onset)    No Known Problems Mother, Father        Tobacco Use    Smoking status: Never Smoker    Smokeless tobacco: Not on file   Substance and Sexual Activity    Alcohol  use: No    Drug use: Not on file    Sexual activity: Yes     Birth control/protection: None     Review of Systems   Constitutional: Positive for fatigue. Negative for chills and fever.   HENT: Positive for ear pain, sore throat and trouble swallowing. Negative for congestion and voice change.    Eyes: Negative for discharge.   Respiratory: Positive for cough and shortness of breath. Negative for apnea.    Cardiovascular: Negative for chest pain.   Gastrointestinal: Negative for abdominal distention.   Endocrine: Negative for cold intolerance and heat intolerance.   Genitourinary: Negative for difficulty urinating.   Allergic/Immunologic: Negative for environmental allergies.   Neurological: Negative for dizziness.   Psychiatric/Behavioral: Negative for agitation.     Objective:     Vital Signs (Most Recent):  Temp: 97.7 °F (36.5 °C) (02/05/22 1945)  Pulse: 77 (02/05/22 1945)  Resp: 18 (02/05/22 1945)  BP: (!) 118/58 (02/05/22 1945)  SpO2: (!) 92 % (02/05/22 1945) Vital Signs (24h Range):  Temp:  [97.7 °F (36.5 °C)-99.1 °F (37.3 °C)] 97.7 °F (36.5 °C)  Pulse:  [67-97] 77  Resp:  [8-27] 18  SpO2:  [90 %-97 %] 92 %  BP: ()/(44-96) 118/58     Weight: (!) 198.3 kg (437 lb 2.8 oz)  Body mass index is 68.47 kg/m².        Physical Exam  Vitals and nursing note reviewed.   Constitutional:       General: She is not in acute distress.     Appearance: Normal appearance. She is obese. She is not ill-appearing.   HENT:      Head: Normocephalic and atraumatic.      Right Ear: External ear normal.      Left Ear: External ear normal.      Nose: Nose normal. No congestion.      Mouth/Throat:      Mouth: Mucous membranes are moist. No injury or oral lesions.      Pharynx: Oropharynx is clear. Uvula midline. No pharyngeal swelling, oropharyngeal exudate, posterior oropharyngeal erythema or uvula swelling.      Tonsils: No tonsillar exudate or tonsillar abscesses. 0 on the right. 0 on the left.   Eyes:      General:          Right eye: No discharge.         Left eye: No discharge.      Extraocular Movements: Extraocular movements intact.      Pupils: Pupils are equal, round, and reactive to light.   Neck:     Cardiovascular:      Rate and Rhythm: Normal rate.      Pulses: Normal pulses.   Pulmonary:      Effort: Pulmonary effort is normal. No respiratory distress.      Breath sounds: No stridor.   Abdominal:      General: Abdomen is flat.      Tenderness: There is no abdominal tenderness.   Skin:     General: Skin is warm.   Neurological:      General: No focal deficit present.      Mental Status: She is alert and oriented to person, place, and time.      Cranial Nerves: No cranial nerve deficit.         Flexible Fiberoptic Laryngoscopy    After verbal consent was obtained, the flexible laryngoscope was introduced with the following findings:  Nasal cavity: no masses or lesions, pink mucosa, no purulence  Nasopharynx: no masses or lesions, carrington wnl  Oropharynx: no masses or lesions, tonsils WNL, BOT WNL  Larynx and Hypopharynx: Bilateral vocal folds freely mobile to adduction and abduction, no masses or lesions visualized.   The patient tolerated the procedure well.     Significant Labs:  BMP:   Recent Labs   Lab 02/05/22  0739   *   CL 97   CO2 31*   BUN 10   CREATININE 1.0   CALCIUM 8.9     CBC:   Recent Labs   Lab 02/05/22  0739   WBC 8.42   RBC 4.35   HGB 12.1   HCT 41.0      MCV 94   MCH 27.8   MCHC 29.5*       Significant Diagnostics:  CT: I have reviewed all pertinent results/findings within the past 24 hours and my personal findings are:  no retropharyngeal fluid collection appreciated.     Assessment/Plan:     Pharyngitis  41 F with pharyngitis, appears to be improving. No acute airway abnormality or drainable abscess seen on exam and CT scan.     -- no acute ENT surgical intervention indicated at this time  -- continue antibiotics, rec steroid taper.   -- transition to po abx per primary team  -- discussed with  staff.      VTE Risk Mitigation (From admission, onward)    None          Thank you for your consult. I will sign off. Please contact us if you have any additional questions.    Tim wOens MD  Otorhinolaryngology-Head & Neck Surgery  [facility]

## 2022-02-06 NOTE — ASSESSMENT & PLAN NOTE
Body mass index is 68.47 kg/m². Morbid obesity complicates all aspects of disease management from diagnostic modalities to treatment. Weight loss encouraged and health benefits explained to patient.

## 2022-02-06 NOTE — SUBJECTIVE & OBJECTIVE
Medications:  Continuous Infusions:  Scheduled Meds:  PRN Meds:     Current Facility-Administered Medications on File Prior to Encounter   Medication    [COMPLETED] iohexoL (OMNIPAQUE 350) injection 100 mL    [COMPLETED] LIDOcaine (PF) 10 mg/ml (1%) injection 50 mg    [DISCONTINUED] acetaminophen tablet 650 mg    [DISCONTINUED] albuterol nebulizer solution 2.5 mg    [DISCONTINUED] albuterol nebulizer solution 2.5 mg    [DISCONTINUED] albuterol-ipratropium 2.5 mg-0.5 mg/3 mL nebulizer solution 3 mL    [DISCONTINUED] aspirin chewable tablet 81 mg    [DISCONTINUED] atorvastatin tablet 80 mg    [DISCONTINUED] budesonide nebulizer solution 0.5 mg    [DISCONTINUED] cloNIDine 0.2 mg/24 hr td ptwk 1 patch    [DISCONTINUED] diltiaZEM 24 hr capsule 360 mg    [DISCONTINUED] FLUoxetine capsule 40 mg    [DISCONTINUED] furosemide tablet 40 mg    [DISCONTINUED] gabapentin capsule 300 mg    [DISCONTINUED] isosorbide mononitrate 24 hr tablet 30 mg    [DISCONTINUED] losartan tablet 25 mg    [DISCONTINUED] melatonin tablet 6 mg    [DISCONTINUED] metoprolol succinate (TOPROL-XL) 24 hr tablet 25 mg    [DISCONTINUED] multivitamin tablet    [DISCONTINUED] ondansetron injection 4 mg    [DISCONTINUED] pantoprazole EC tablet 40 mg    [DISCONTINUED] piperacillin-tazobactam 4.5 g in dextrose 5 % 100 mL IVPB (ready to mix system)    [DISCONTINUED] sodium chloride 0.9% flush 10 mL    [DISCONTINUED] spironolactone tablet 50 mg    [DISCONTINUED] vancomycin (VANCOCIN) 2,000 mg in dextrose 5 % 500 mL IVPB    [DISCONTINUED] vancomycin - pharmacy to dose    [DISCONTINUED] vancomycin - pharmacy to dose    [DISCONTINUED] vancomycin in dextrose 5 % 1 gram/250 mL IVPB 1,000 mg     Current Outpatient Medications on File Prior to Encounter   Medication Sig    ADVAIR DISKUS 100-50 mcg/dose diskus inhaler Inhale 1 puff into the lungs every 4 to 6 hours as needed.    albuterol (VENTOLIN HFA) 90 mcg/actuation inhaler Inhale 2 puffs  into the lungs every 4 (four) hours as needed for Wheezing or Shortness of Breath. Rescue    albuterol-ipratropium (DUO-NEB) 2.5 mg-0.5 mg/3 mL nebulizer solution Take 3 mLs by nebulization every 6 (six) hours while awake.    aspirin 81 MG Chew Take 1 tablet (81 mg total) by mouth once daily.    atorvastatin (LIPITOR) 80 MG tablet Take 1 tablet (80 mg total) by mouth every evening.    budesonide (PULMICORT) 0.5 mg/2 mL nebulizer solution Take 2 mLs (0.5 mg total) by nebulization 2 (two) times daily. Controller    cloNIDine 0.2 mg/24 hr td ptwk (CATAPRES) 0.2 mg/24 hr Place 1 patch onto the skin every 7 days. THURSDAYS    cyclobenzaprine (FLEXERIL) 10 MG tablet Take 1 tablet by mouth 2 (two) times a day.    diltiaZEM (TIAZAC) 360 MG Cs24 Take 360 mg by mouth once daily.    ergocalciferol (ERGOCALCIFEROL) 50,000 unit Cap Take 50,000 Units by mouth every 7 days. THURSDAYS    fluoxetine (PROZAC) 40 MG capsule Take 40 mg by mouth once daily.    furosemide (LASIX) 40 MG tablet Take 40 mg by mouth 2 (two) times daily.    gabapentin (NEURONTIN) 300 MG capsule Take 300 mg by mouth 2 (two) times daily.    isosorbide mononitrate (IMDUR) 30 MG 24 hr tablet Take 1 tablet (30 mg total) by mouth once daily.    metoprolol succinate (TOPROL-XL) 25 MG 24 hr tablet Take 25 mg by mouth once daily.    multivitamin Tab Take 1 tablet by mouth once daily.    pantoprazole (PROTONIX) 40 MG tablet Take 1 tablet (40 mg total) by mouth once daily.    spironolactone (ALDACTONE) 50 MG tablet Take 50 mg by mouth once daily.    [DISCONTINUED] alprazolam (XANAX) 2 MG Tab Take 2 mg by mouth 3 (three) times daily as needed (anxiety).     [DISCONTINUED] estradioL (ESTRACE) 0.01 % (0.1 mg/gram) vaginal cream     [DISCONTINUED] hydrocodone-acetaminophen 10-325mg (NORCO)  mg Tab Take 1 tablet by mouth every 6 (six) hours as needed for Pain.    [DISCONTINUED] losartan (COZAAR) 25 MG tablet Take 1 tablet (25 mg total) by mouth once  daily.    [DISCONTINUED] metFORMIN (GLUCOPHAGE) 500 MG tablet Take 500 mg by mouth 2 (two) times daily.    [DISCONTINUED] metoprolol tartrate (LOPRESSOR) 25 MG tablet Take 25 mg by mouth.    [DISCONTINUED] zolpidem (AMBIEN) 10 mg Tab Take 1 tablet by mouth every evening.       Review of patient's allergies indicates:  No Known Allergies    Past Medical History:   Diagnosis Date    Anxiety     COVID-19     Depression     Heart attack     Hyperlipidemia     Hypertension     Obesity     Obesity      Past Surgical History:   Procedure Laterality Date    CHOLECYSTECTOMY      HYSTERECTOMY       Family History     Problem Relation (Age of Onset)    No Known Problems Mother, Father        Tobacco Use    Smoking status: Never Smoker    Smokeless tobacco: Not on file   Substance and Sexual Activity    Alcohol use: No    Drug use: Not on file    Sexual activity: Yes     Birth control/protection: None     Review of Systems   Constitutional: Positive for fatigue. Negative for chills and fever.   HENT: Positive for ear pain, sore throat and trouble swallowing. Negative for congestion and voice change.    Eyes: Negative for discharge.   Respiratory: Positive for cough and shortness of breath. Negative for apnea.    Cardiovascular: Negative for chest pain.   Gastrointestinal: Negative for abdominal distention.   Endocrine: Negative for cold intolerance and heat intolerance.   Genitourinary: Negative for difficulty urinating.   Allergic/Immunologic: Negative for environmental allergies.   Neurological: Negative for dizziness.   Psychiatric/Behavioral: Negative for agitation.     Objective:     Vital Signs (Most Recent):  Temp: 97.7 °F (36.5 °C) (02/05/22 1945)  Pulse: 77 (02/05/22 1945)  Resp: 18 (02/05/22 1945)  BP: (!) 118/58 (02/05/22 1945)  SpO2: (!) 92 % (02/05/22 1945) Vital Signs (24h Range):  Temp:  [97.7 °F (36.5 °C)-99.1 °F (37.3 °C)] 97.7 °F (36.5 °C)  Pulse:  [67-97] 77  Resp:  [8-27] 18  SpO2:  [90  %-97 %] 92 %  BP: ()/(44-96) 118/58     Weight: (!) 198.3 kg (437 lb 2.8 oz)  Body mass index is 68.47 kg/m².        Physical Exam  Vitals and nursing note reviewed.   Constitutional:       General: She is not in acute distress.     Appearance: Normal appearance. She is obese. She is not ill-appearing.   HENT:      Head: Normocephalic and atraumatic.      Right Ear: External ear normal.      Left Ear: External ear normal.      Nose: Nose normal. No congestion.      Mouth/Throat:      Mouth: Mucous membranes are moist. No injury or oral lesions.      Pharynx: Oropharynx is clear. Uvula midline. No pharyngeal swelling, oropharyngeal exudate, posterior oropharyngeal erythema or uvula swelling.      Tonsils: No tonsillar exudate or tonsillar abscesses. 0 on the right. 0 on the left.   Eyes:      General:         Right eye: No discharge.         Left eye: No discharge.      Extraocular Movements: Extraocular movements intact.      Pupils: Pupils are equal, round, and reactive to light.   Neck:     Cardiovascular:      Rate and Rhythm: Normal rate.      Pulses: Normal pulses.   Pulmonary:      Effort: Pulmonary effort is normal. No respiratory distress.      Breath sounds: No stridor.   Abdominal:      General: Abdomen is flat.      Tenderness: There is no abdominal tenderness.   Skin:     General: Skin is warm.   Neurological:      General: No focal deficit present.      Mental Status: She is alert and oriented to person, place, and time.      Cranial Nerves: No cranial nerve deficit.         Flexible Fiberoptic Laryngoscopy    After verbal consent was obtained, the flexible laryngoscope was introduced with the following findings:  Nasal cavity: no masses or lesions, pink mucosa, no purulence  Nasopharynx: no masses or lesions, carrington wnl  Oropharynx: no masses or lesions, tonsils WNL, BOT WNL  Larynx and Hypopharynx: Bilateral vocal folds freely mobile to adduction and abduction, no masses or lesions visualized.    The patient tolerated the procedure well.     Significant Labs:  BMP:   Recent Labs   Lab 02/05/22  0739   *   CL 97   CO2 31*   BUN 10   CREATININE 1.0   CALCIUM 8.9     CBC:   Recent Labs   Lab 02/05/22  0739   WBC 8.42   RBC 4.35   HGB 12.1   HCT 41.0      MCV 94   MCH 27.8   MCHC 29.5*       Significant Diagnostics:  CT: I have reviewed all pertinent results/findings within the past 24 hours and my personal findings are:  no retropharyngeal fluid collection appreciated.

## 2022-02-06 NOTE — ASSESSMENT & PLAN NOTE
41 F with pharyngitis, appears to be improving. No acute airway abnormality or drainable abscess seen on exam and CT scan.     -- no acute ENT surgical intervention indicated at this time  -- continue antibiotics, rec steroid taper.   -- transition to po abx per primary team  -- discussed with staff.

## 2022-02-06 NOTE — PLAN OF CARE
02/06/22 0847   Final Note   Assessment Type Final Discharge Note   Anticipated Discharge Disposition ShortTerm SD   Post-Acute Status   Discharge Delays None known at this time   Discharge orders reviewed.  No case management needs noted.

## 2022-02-06 NOTE — SUBJECTIVE & OBJECTIVE
Interval History:   No events oversight. Reports improvement of her swallowing and respiratory status. Denies fever, chill, dysphagia, nausea, and fatigue.       Review of Systems   Constitutional: Negative for diaphoresis, fatigue and fever.   HENT: Negative for trouble swallowing.         Mild odynophagia   Eyes: Negative.    Respiratory: Negative for cough and shortness of breath.         ALMANZAR   Cardiovascular: Negative.    Gastrointestinal: Negative for abdominal distention, abdominal pain, nausea and vomiting.   Endocrine: Negative for cold intolerance and heat intolerance.   Genitourinary: Negative.    Musculoskeletal: Negative for arthralgias, gait problem and myalgias.   Skin: Negative.    Allergic/Immunologic: Negative.    Neurological: Negative for weakness.   Hematological: Negative.    Psychiatric/Behavioral: Negative for agitation and dysphoric mood. The patient is not nervous/anxious.      Objective:     Vital Signs (Most Recent):  Temp: 97.4 °F (36.3 °C) (02/06/22 1239)  Pulse: 81 (02/06/22 1239)  Resp: 20 (02/06/22 1239)  BP: 120/73 (02/06/22 1239)  SpO2: (!) 94 % (02/06/22 1239) Vital Signs (24h Range):  Temp:  [97.4 °F (36.3 °C)-98.9 °F (37.2 °C)] 97.4 °F (36.3 °C)  Pulse:  [74-91] 81  Resp:  [16-20] 20  SpO2:  [90 %-96 %] 94 %  BP: (110-136)/(55-82) 120/73     Weight: (!) 198.3 kg (437 lb 2.8 oz)  Body mass index is 68.47 kg/m².  No intake or output data in the 24 hours ending 02/06/22 1421   Physical Exam  Vitals and nursing note reviewed.   Constitutional:       General: She is not in acute distress.     Appearance: She is obese.   HENT:      Head: Normocephalic and atraumatic.      Nose: Nose normal.      Mouth/Throat:      Mouth: Mucous membranes are moist.   Eyes:      Extraocular Movements: Extraocular movements intact.      Pupils: Pupils are equal, round, and reactive to light.   Cardiovascular:      Rate and Rhythm: Normal rate and regular rhythm.      Pulses: Normal pulses.      Heart  sounds: Murmur heard.       Pulmonary:      Effort: Pulmonary effort is normal. No respiratory distress.      Breath sounds: No wheezing or rales.   Abdominal:      Palpations: Abdomen is soft.      Tenderness: There is no abdominal tenderness.   Musculoskeletal:         General: Normal range of motion.      Cervical back: Normal range of motion and neck supple.      Right lower leg: No edema.      Left lower leg: No edema.   Skin:     General: Skin is warm.   Neurological:      General: No focal deficit present.      Mental Status: She is alert and oriented to person, place, and time. Mental status is at baseline.   Psychiatric:         Mood and Affect: Mood normal.         Behavior: Behavior normal.         Significant Labs:   BMP:   Recent Labs   Lab 02/06/22  0348   *      K 3.6      CO2 32*   BUN 10   CREATININE 0.8   CALCIUM 9.5     CBC:   Recent Labs   Lab 02/04/22  1942 02/05/22  0700 02/05/22  0739 02/06/22  0348   WBC 11.73  --  8.42 8.83   HGB 12.3  --  12.1 12.0   HCT 41.1 40 41.0 38.8     --  383 375       Significant Imaging: I have reviewed all pertinent imaging results/findings within the past 24 hours.

## 2022-02-06 NOTE — PLAN OF CARE
POC reviewed with patient who verbalized understanding. VSS on 1L NC. AAOX4. Remains free of falls and injury. Pt up independently in room    - 3 x BM during shift  - IV ABX d/c    Tolerating cardiac diet, denies nausea. Pain controlled with PRN medications per MAR. Patient denies chest pain & SOB. Pt refused SCDS. No acute events. No distress noted. Bed in lowest position, call light within reach, frequent rounds made for safety.     WC     Problem: Adult Inpatient Plan of Care  Goal: Plan of Care Review  Outcome: Ongoing, Progressing  Goal: Absence of Hospital-Acquired Illness or Injury  Outcome: Ongoing, Progressing  Goal: Optimal Comfort and Wellbeing  Outcome: Ongoing, Progressing     Problem: Bariatric Environmental Safety  Goal: Safety Maintained with Care  Outcome: Ongoing, Progressing

## 2022-02-06 NOTE — PT/OT/SLP EVAL
Physical Therapy Evaluation and Discharge Note    Patient Name:  Maribel Hernandez   MRN:  [id number]    Recommendations:     Discharge Recommendations:  home   Discharge Equipment Recommendations: none   Barriers to discharge: None    Assessment:     Maribel Heranndez is a 41 y.o. female admitted with a medical diagnosis of Acute hypercapnic respiratory failure. .  At this time, patient is functioning at their prior level of function and does not require further acute PT services.     Recent Surgery: * No surgery found *      Plan:     During this hospitalization, patient does not require further acute PT services.  Please re-consult if situation changes.      Subjective     Chief Complaint: decreased endurance  Patient/Family Comments/goals: to go home  Pain/Comfort:  · Pain Rating 1: 0/10  · Pain Rating Post-Intervention 1: 0/10    Patients cultural, spiritual, Muslim conflicts given the current situation: no    Living Environment:  Pt. Lives with daughter in [facility] with 3 MAIKEL and (L) handrail.  Prior to admission, patients level of function was amb. with/without SC.  Equipment used at home: cane, straight.  Upon discharge, patient will have assistance from family.    Objective:     Communicated with nursing prior to session.  Patient found supine with peripheral IV upon PT entry to room.    General Precautions: Standard, fall   Orthopedic Precautions:N/A   Braces: N/A   Respiratory Status: Nasal cannula, flow 1 L/min    Exams:  · RLE ROM: WFL  · RLE Strength: WFL  · LLE ROM: WFL  · LLE Strength: WFL    Functional Mobility:  · Bed Mobility:     · Rolling Left:  supervision  · Scooting: supervision  · Supine to Sit: supervision  · Transfers:     · Sit to Stand:  supervision with no AD  · Gait: 100' with Supervision without AD or LOB  · Balance: good    AM-PAC 6 CLICK MOBILITY  Total Score:22       Therapeutic Activities and Exercises:   Discussed therapy needs, goals, and POC.    AM-PAC 6 CLICK  MOBILITY  Total Score:22     Patient left up in chair with all lines intact and call button in reach.    GOALS:   Multidisciplinary Problems     Physical Therapy Goals     Not on file          Multidisciplinary Problems (Resolved)        Problem: Physical Therapy Goal    Goal Priority Disciplines Outcome Goal Variances Interventions   Physical Therapy Goal   (Resolved)     PT, PT/OT Met                     History:     Past Medical History:   Diagnosis Date    Anxiety     COVID-19     Depression     Heart attack     Hyperlipidemia     Hypertension     Obesity     Obesity        Past Surgical History:   Procedure Laterality Date    CHOLECYSTECTOMY      HYSTERECTOMY         Time Tracking:     PT Received On: 02/06/22  PT Start Time: 1110     PT Stop Time: 1121  PT Total Time (min): 11 min     Billable Minutes: Evaluation 11      02/06/2022

## 2022-02-06 NOTE — ASSESSMENT & PLAN NOTE
- Discontinue canc/zosyn  - ENT consulted, no intervention recommended   -BCx 2/3 with coag negative staph  -- Repeat NGTD  - Will deescalate to PO Augmentin  -- Plan for 10 days of abx  - Six day steroid taper (40 x2, 20 x2, 10 x2)

## 2022-02-06 NOTE — PROGRESS NOTES
[facility]  Progress Note    Patient Name: Maribel Hernandez  MRN: [id number]  Patient Class: IP- Inpatient   Admission Date: 2/5/2022  Length of Stay: 1 days  Attending Physician: Sanjay Mcgill MD  Primary Care Provider: Graciela Mercer NP        Subjective:     Principal Problem:Acute hypercapnic respiratory failure        HPI:  40 yo F with PMHx morbid obesity with COLIN/OHS, HTN, and HLD who presented to [facility] 2/2/22 complaining of sore throat and difficulty swallowing. Pt. Reported associated tenderness to her anterior neck. CT evaluation of soft tissue neck reveals moderate swelling and inflammation consistent with retropharyngeal phlegmon/cellulitis there is no sign of abscess. Patient did not have any stridor and was tolerating secretions.. Patient treated with IV vanc+zosyn (through today) and solumedrol 125 mg x1. Pt. Stay was complicated by acute hyper capneic respiratory failure. The patient reportedly became lethargic yesterday evening. ABG revealed pH 7.282, pCO2 72.3. Pt. Was placed on continuous bipap with improvement in mentation and repeat ABG on morning of transfer was 7.37. At time of my interview, pt. Is resting comfortably on 1L NC. She reports improvement in her sore throat but she still has some residual tenderness. No reported SOB, cough, fevers, chills, nausea, or vomiting.     A repeat CT 2/4 showed Mild residual retropharyngeal cellulitis, improved from prior, with no evidence of abscess.      Overview/Hospital Course:  No notes on file    Interval History:   No events oversight. Reports improvement of her swallowing and respiratory status. Denies fever, chill, dysphagia, nausea, and fatigue.       Review of Systems   Constitutional: Negative for diaphoresis, fatigue and fever.   HENT: Negative for trouble swallowing.         Mild odynophagia   Eyes: Negative.    Respiratory: Negative for cough and shortness of breath.         ALMANZAR    Cardiovascular: Negative.    Gastrointestinal: Negative for abdominal distention, abdominal pain, nausea and vomiting.   Endocrine: Negative for cold intolerance and heat intolerance.   Genitourinary: Negative.    Musculoskeletal: Negative for arthralgias, gait problem and myalgias.   Skin: Negative.    Allergic/Immunologic: Negative.    Neurological: Negative for weakness.   Hematological: Negative.    Psychiatric/Behavioral: Negative for agitation and dysphoric mood. The patient is not nervous/anxious.      Objective:     Vital Signs (Most Recent):  Temp: 97.4 °F (36.3 °C) (02/06/22 1239)  Pulse: 81 (02/06/22 1239)  Resp: 20 (02/06/22 1239)  BP: 120/73 (02/06/22 1239)  SpO2: (!) 94 % (02/06/22 1239) Vital Signs (24h Range):  Temp:  [97.4 °F (36.3 °C)-98.9 °F (37.2 °C)] 97.4 °F (36.3 °C)  Pulse:  [74-91] 81  Resp:  [16-20] 20  SpO2:  [90 %-96 %] 94 %  BP: (110-136)/(55-82) 120/73     Weight: (!) 198.3 kg (437 lb 2.8 oz)  Body mass index is 68.47 kg/m².  No intake or output data in the 24 hours ending 02/06/22 1421   Physical Exam  Vitals and nursing note reviewed.   Constitutional:       General: She is not in acute distress.     Appearance: She is obese.   HENT:      Head: Normocephalic and atraumatic.      Nose: Nose normal.      Mouth/Throat:      Mouth: Mucous membranes are moist.   Eyes:      Extraocular Movements: Extraocular movements intact.      Pupils: Pupils are equal, round, and reactive to light.   Cardiovascular:      Rate and Rhythm: Normal rate and regular rhythm.      Pulses: Normal pulses.      Heart sounds: Murmur heard.       Pulmonary:      Effort: Pulmonary effort is normal. No respiratory distress.      Breath sounds: No wheezing or rales.   Abdominal:      Palpations: Abdomen is soft.      Tenderness: There is no abdominal tenderness.   Musculoskeletal:         General: Normal range of motion.      Cervical back: Normal range of motion and neck supple.      Right lower leg: No edema.       Left lower leg: No edema.   Skin:     General: Skin is warm.   Neurological:      General: No focal deficit present.      Mental Status: She is alert and oriented to person, place, and time. Mental status is at baseline.   Psychiatric:         Mood and Affect: Mood normal.         Behavior: Behavior normal.         Significant Labs:   BMP:   Recent Labs   Lab 02/06/22  0348   *      K 3.6      CO2 32*   BUN 10   CREATININE 0.8   CALCIUM 9.5     CBC:   Recent Labs   Lab 02/04/22  1942 02/05/22  0700 02/05/22  0739 02/06/22  0348   WBC 11.73  --  8.42 8.83   HGB 12.3  --  12.1 12.0   HCT 41.1 40 41.0 38.8     --  383 375       Significant Imaging: I have reviewed all pertinent imaging results/findings within the past 24 hours.      Assessment/Plan:      * Acute hypercapnic respiratory failure  COLIN/OHS  -Severe morbid obesity and COLIN/OHS contributing to episode of acute hypercapneic respiratory failure Pt. Removed from continuous Bipap early this monring, continue Bipap QHS and monitor respiratory sattus and mentation closely  -Low threshold to repeat ABG if pt. Shows sings of respiratory failure/AMS  -Pt. Without home Bipap/CPAP machine, SW consult placed to asssit with possibly obtaining prior to discharge  - qhs BiPAP 22/12  - Improved       Pharyngitis  - Discontinue canc/zosyn  - ENT consulted, no intervention recommended   -BCx 2/3 with coag negative staph  -- Repeat NGTD  - Will deescalate to PO Augmentin  - Six day steroid taper (40 x2, 20 x2, 10 x2)    Obesity hypoventilation syndrome  Body mass index is 68.47 kg/m². Morbid obesity complicates all aspects of disease management from diagnostic modalities to treatment. Weight loss encouraged and health benefits explained to patient.         HLD (hyperlipidemia)  - Continue home statin      Essential hypertension  - Continue home losartan, imdur, aldactone, diltiazem, clonidine patch, toprol-XL and lasix      COLIN on CPAP  - See  above        VTE Risk Mitigation (From admission, onward)         Ordered     enoxaparin injection 40 mg  Every 12 hours         02/05/22 2031     IP VTE HIGH RISK PATIENT  Once         02/05/22 2031     Place sequential compression device  Until discontinued         02/05/22 2031                Discharge Planning   BUDDY: 2/8/2022     Code Status: Full Code   Is the patient medically ready for discharge?: No    Reason for patient still in hospital (select all that apply): Patient trending condition, Laboratory test, Treatment, Consult recommendations, PT / OT recommendations and Pending disposition                     Sanjay Mcgill MD  Department of Hospital Medicine   [facility]

## 2022-02-07 VITALS
TEMPERATURE: 98 F | DIASTOLIC BLOOD PRESSURE: 75 MMHG | WEIGHT: 293 LBS | OXYGEN SATURATION: 95 % | RESPIRATION RATE: 18 BRPM | SYSTOLIC BLOOD PRESSURE: 138 MMHG | BODY MASS INDEX: 45.99 KG/M2 | HEIGHT: 67 IN | HEART RATE: 73 BPM

## 2022-02-07 LAB
ANION GAP SERPL CALC-SCNC: 7 MMOL/L (ref 8–16)
BASOPHILS # BLD AUTO: 0.05 K/UL (ref 0–0.2)
BASOPHILS NFR BLD: 0.5 % (ref 0–1.9)
BUN SERPL-MCNC: 14 MG/DL (ref 6–20)
CALCIUM SERPL-MCNC: 9.5 MG/DL (ref 8.7–10.5)
CHLORIDE SERPL-SCNC: 100 MMOL/L (ref 95–110)
CO2 SERPL-SCNC: 31 MMOL/L (ref 23–29)
CREAT SERPL-MCNC: 0.9 MG/DL (ref 0.5–1.4)
DIFFERENTIAL METHOD: ABNORMAL
EOSINOPHIL # BLD AUTO: 0.1 K/UL (ref 0–0.5)
EOSINOPHIL NFR BLD: 1 % (ref 0–8)
ERYTHROCYTE [DISTWIDTH] IN BLOOD BY AUTOMATED COUNT: 12.8 % (ref 11.5–14.5)
EST. GFR  (AFRICAN AMERICAN): >60 ML/MIN/1.73 M^2
EST. GFR  (NON AFRICAN AMERICAN): >60 ML/MIN/1.73 M^2
GLUCOSE SERPL-MCNC: 152 MG/DL (ref 70–110)
HCT VFR BLD AUTO: 39.3 % (ref 37–48.5)
HGB BLD-MCNC: 12.1 G/DL (ref 12–16)
IMM GRANULOCYTES # BLD AUTO: 0.09 K/UL (ref 0–0.04)
IMM GRANULOCYTES NFR BLD AUTO: 0.9 % (ref 0–0.5)
LYMPHOCYTES # BLD AUTO: 2.2 K/UL (ref 1–4.8)
LYMPHOCYTES NFR BLD: 20.7 % (ref 18–48)
MCH RBC QN AUTO: 28.3 PG (ref 27–31)
MCHC RBC AUTO-ENTMCNC: 30.8 G/DL (ref 32–36)
MCV RBC AUTO: 92 FL (ref 82–98)
MONOCYTES # BLD AUTO: 0.5 K/UL (ref 0.3–1)
MONOCYTES NFR BLD: 5 % (ref 4–15)
NEUTROPHILS # BLD AUTO: 7.5 K/UL (ref 1.8–7.7)
NEUTROPHILS NFR BLD: 71.9 % (ref 38–73)
NRBC BLD-RTO: 0 /100 WBC
PLATELET # BLD AUTO: 402 K/UL (ref 150–450)
PMV BLD AUTO: 10.2 FL (ref 9.2–12.9)
POTASSIUM SERPL-SCNC: 3.6 MMOL/L (ref 3.5–5.1)
RBC # BLD AUTO: 4.28 M/UL (ref 4–5.4)
SODIUM SERPL-SCNC: 138 MMOL/L (ref 136–145)
WBC # BLD AUTO: 10.43 K/UL (ref 3.9–12.7)

## 2022-02-07 PROCEDURE — 25000003 PHARM REV CODE 250: Performed by: STUDENT IN AN ORGANIZED HEALTH CARE EDUCATION/TRAINING PROGRAM

## 2022-02-07 PROCEDURE — 36415 COLL VENOUS BLD VENIPUNCTURE: CPT | Performed by: STUDENT IN AN ORGANIZED HEALTH CARE EDUCATION/TRAINING PROGRAM

## 2022-02-07 PROCEDURE — 99239 HOSP IP/OBS DSCHRG MGMT >30: CPT | Mod: ,,, | Performed by: STUDENT IN AN ORGANIZED HEALTH CARE EDUCATION/TRAINING PROGRAM

## 2022-02-07 PROCEDURE — 27000221 HC OXYGEN, UP TO 24 HOURS

## 2022-02-07 PROCEDURE — 99239 PR HOSPITAL DISCHARGE DAY,>30 MIN: ICD-10-PCS | Mod: ,,, | Performed by: STUDENT IN AN ORGANIZED HEALTH CARE EDUCATION/TRAINING PROGRAM

## 2022-02-07 PROCEDURE — 25000003 PHARM REV CODE 250: Performed by: HOSPITALIST

## 2022-02-07 PROCEDURE — 80048 BASIC METABOLIC PNL TOTAL CA: CPT | Performed by: STUDENT IN AN ORGANIZED HEALTH CARE EDUCATION/TRAINING PROGRAM

## 2022-02-07 PROCEDURE — 85025 COMPLETE CBC W/AUTO DIFF WBC: CPT | Performed by: STUDENT IN AN ORGANIZED HEALTH CARE EDUCATION/TRAINING PROGRAM

## 2022-02-07 PROCEDURE — 94761 N-INVAS EAR/PLS OXIMETRY MLT: CPT

## 2022-02-07 PROCEDURE — 99900035 HC TECH TIME PER 15 MIN (STAT)

## 2022-02-07 PROCEDURE — 25000242 PHARM REV CODE 250 ALT 637 W/ HCPCS: Performed by: HOSPITALIST

## 2022-02-07 PROCEDURE — 63600175 PHARM REV CODE 636 W HCPCS: Performed by: HOSPITALIST

## 2022-02-07 PROCEDURE — 63600175 PHARM REV CODE 636 W HCPCS: Performed by: STUDENT IN AN ORGANIZED HEALTH CARE EDUCATION/TRAINING PROGRAM

## 2022-02-07 RX ORDER — PREDNISONE 20 MG/1
20 TABLET ORAL DAILY
Qty: 3 TABLET | Refills: 0 | Status: SHIPPED | OUTPATIENT
Start: 2022-02-08 | End: 2022-02-12

## 2022-02-07 RX ORDER — AMOXICILLIN AND CLAVULANATE POTASSIUM 875; 125 MG/1; MG/1
1 TABLET, FILM COATED ORAL EVERY 12 HOURS
Qty: 8 TABLET | Refills: 0 | Status: SHIPPED | OUTPATIENT
Start: 2022-02-07 | End: 2022-02-11

## 2022-02-07 RX ORDER — LOSARTAN POTASSIUM 25 MG/1
25 TABLET ORAL DAILY
Qty: 30 TABLET | Refills: 0 | Status: SHIPPED | OUTPATIENT
Start: 2022-02-07 | End: 2022-03-09

## 2022-02-07 RX ADMIN — SPIRONOLACTONE 50 MG: 25 TABLET ORAL at 08:02

## 2022-02-07 RX ADMIN — PREDNISONE 40 MG: 20 TABLET ORAL at 08:02

## 2022-02-07 RX ADMIN — METOPROLOL SUCCINATE 25 MG: 25 TABLET, EXTENDED RELEASE ORAL at 08:02

## 2022-02-07 RX ADMIN — PANTOPRAZOLE SODIUM 40 MG: 40 TABLET, DELAYED RELEASE ORAL at 08:02

## 2022-02-07 RX ADMIN — ENOXAPARIN SODIUM 40 MG: 100 INJECTION SUBCUTANEOUS at 08:02

## 2022-02-07 RX ADMIN — LOSARTAN POTASSIUM 25 MG: 25 TABLET, FILM COATED ORAL at 08:02

## 2022-02-07 RX ADMIN — FLUTICASONE FUROATE AND VILANTEROL TRIFENATATE 1 PUFF: 100; 25 POWDER RESPIRATORY (INHALATION) at 08:02

## 2022-02-07 RX ADMIN — ASPIRIN 81 MG CHEWABLE TABLET 81 MG: 81 TABLET CHEWABLE at 08:02

## 2022-02-07 RX ADMIN — DILTIAZEM HYDROCHLORIDE 360 MG: 180 CAPSULE, COATED, EXTENDED RELEASE ORAL at 08:02

## 2022-02-07 RX ADMIN — FLUOXETINE 40 MG: 20 CAPSULE ORAL at 08:02

## 2022-02-07 RX ADMIN — AMOXICILLIN AND CLAVULANATE POTASSIUM 1 TABLET: 875; 125 TABLET, FILM COATED ORAL at 08:02

## 2022-02-07 RX ADMIN — ISOSORBIDE MONONITRATE 30 MG: 30 TABLET, EXTENDED RELEASE ORAL at 08:02

## 2022-02-07 RX ADMIN — FUROSEMIDE 40 MG: 40 TABLET ORAL at 08:02

## 2022-02-07 NOTE — DISCHARGE INSTRUCTIONS
You were admitted for phalangitis. Your symptoms improved with antibiotics and steroids; and will continue after discharge.    You will need a CPAP machine to help you sleep and maintain your respiratory status. You will need to follow up a patient for a repeat sleep study to obtain anther CPAP machine and supplies.    Follow up requested with primary care.

## 2022-02-07 NOTE — PLAN OF CARE
Pt is A&Ox4 injury free. Breathing is regular equal and unlabored bilaterally on 1 liter of oxygen. Pt denies pain and nausea at this time.

## 2022-02-07 NOTE — PLAN OF CARE
[facility]  Initial Discharge Assessment       Primary Care Provider: Graciela Mercer NP    Admission Diagnosis: Retropharyngeal abscess [J39.0]    Admission Date: 2/5/2022  Expected Discharge Date: 2/8/2022    Payor: MEDICAID / Plan: HE[place] BLUE [place] / Product Type: Managed Medicaid /     Extended Emergency Contact Information  Primary Emergency Contact: Tasia Hernandez  Address: [address] [place] [address] [id number] [place] [address]  Home Phone: [phone]  Relation: Mother  Secondary Emergency Contact: Heather Hernandez  Mobile Phone: [phone]  Relation: Daughter    Discharge Plan A: Home  Discharge Plan B: Home      [facility] [address] [place], [place] [address]  [address] [facility] [address]  Phone: [phone] Fax: [phone]      Patient is a 41 year old female admitted in transfer from [facility] with Retropharyngeal Swelling.  Patient is expected to discharge home today with no needs.    Patient lives in a one story home with her daughter.  Patient had CPAP in the past, but, has not had one since she had covid in 2020.  Patient was independent in ADLs without use of DME prior to admit.  MD note states patient needs CPAP machine for discharge.  Patient will have to schedule sleep study prior to obtaining new CPAP mask.        Initial Assessment (most recent)     Adult Discharge Assessment - 02/07/22 0701        Discharge Assessment    Assessment Type Discharge Planning Assessment     Source of Information health record     Reason For Admission Retropharyngeal Cellulitis     Lives With child(mary), adult     Facility Arrived From: [facility]     Do you expect to return to your current living situation? Yes     Do you have help at home or someone to help you manage your care at home? Yes     Who are your caregiver(s) and their phone number(s)? daughter     Prior to hospitilization cognitive status:  Alert/Oriented     Current cognitive status: Alert/Oriented     Walking or Climbing Stairs Difficulty none     Dressing/Bathing Difficulty none     Home Layout Able to live on 1st floor     Equipment Currently Used at Home cane, straight     Do you take prescription medications? Yes     Do you have prescription coverage? Yes     Do you have any problems affording any of your prescribed medications? No     Is the patient taking medications as prescribed? yes     Who is going to help you get home at discharge? Family     How do you get to doctors appointments? family or friend will provide     Discharge Plan A Home     Discharge Plan B Home                 Readmission Assessment (most recent)     Readmission Assessment - 02/07/22 0700        Readmission    Why were you hospitalized in the last 30 days? transfer from [facility]

## 2022-02-08 LAB — BACTERIA BLD CULT: NORMAL

## 2022-02-08 NOTE — PLAN OF CARE
[facility]  Discharge Final Note    Primary Care Provider: Graciela Mercer NP    Expected Discharge Date: 2/9/2022     Future Appointments   Date Time Provider Department Center   2/18/2022  2:40 PM [facility]         Final Discharge Note (most recent)     Final Note - 02/08/22 0717        Final Note    Assessment Type Final Discharge Note     Anticipated Discharge Disposition Home or Self Care        Post-Acute Status    Post-Acute Authorization Other     Other Status No Post-Acute Service Needs                 Important Message from Medicare             Contact Info     Graciela Mercer NP   Specialty: Family Medicine   Relationship: PCP - General    [address]  [facility] [address]   Phone: [phone]       Next Steps: Go on 2/9/2022    Instructions: Primary care appointment at 1:45 PM

## 2022-02-10 LAB
BACTERIA BLD CULT: NORMAL
BACTERIA BLD CULT: NORMAL

## 2022-02-10 NOTE — DISCHARGE SUMMARY
[facility]  Discharge Summary      Patient Name: Maribel Hernandez  MRN: [id number]  Patient Class: IP- Inpatient  Admission Date: 2/5/2022  Hospital Length of Stay: 2 days  Discharge Date and Time: 2/7/2022  4:57 PM  Attending Physician: No att. providers found   Discharging Provider: Sanjay Mcgill MD  Primary Care Provider: Graciela Mercer NP  [facility] Medicine Team: [facility] Sanjay Mcgill MD    HPI:   40 yo F with PMHx morbid obesity with COLIN/OHS, HTN, and HLD who presented to [facility] 2/2/22 complaining of sore throat and difficulty swallowing. Pt. Reported associated tenderness to her anterior neck. CT evaluation of soft tissue neck reveals moderate swelling and inflammation consistent with retropharyngeal phlegmon/cellulitis there is no sign of abscess. Patient did not have any stridor and was tolerating secretions.. Patient treated with IV vanc+zosyn (through today) and solumedrol 125 mg x1. Pt. Stay was complicated by acute hyper capneic respiratory failure. The patient reportedly became lethargic yesterday evening. ABG revealed pH 7.282, pCO2 72.3. Pt. Was placed on continuous bipap with improvement in mentation and repeat ABG on morning of transfer was 7.37. At time of my interview, pt. Is resting comfortably on 1L NC. She reports improvement in her sore throat but she still has some residual tenderness. No reported SOB, cough, fevers, chills, nausea, or vomiting.     A repeat CT 2/4 showed Mild residual retropharyngeal cellulitis, improved from prior, with no evidence of abscess.      * No surgery found *      Hospital Course:   Severe morbid obesity and COLIN/OHS contributing to episode of acute hypercapneic respiratory failure Patient continued on Bipap 22/12 QHS. Patient without home Bipap/CPAP machine, SW consult placed to asssit with possibly obtaining prior to discharge. However, patient will need formal sleep study.     ENT  consulted, no intervention recommended. Pharyngitis improved. BCx 2/3 with coag negative staph. Discontinue vanc/zosyn, and deescalated to PO Augmentin. Discharged on six day prednisone taper (40 x2, 20 x2, 10 x2) per ENT. Follow up requested with primary care and sleep medicine.       Goals of Care Treatment Preferences:  Code Status: Full Code      Consults:     No new Assessment & Plan notes have been filed under this hospital service since the last note was generated.  Service: Hospital Medicine    Final Active Diagnoses:    Diagnosis Date Noted POA    PRINCIPAL PROBLEM:  Acute hypercapnic respiratory failure [J96.02] 02/05/2022 Yes    Pharyngitis [J02.9] 02/05/2022 Yes    HLD (hyperlipidemia) [E78.5] 02/05/2022 Yes    Obesity hypoventilation syndrome [E66.2] 02/05/2022 Yes    COLIN on CPAP [G47.33, Z99.89] 09/07/2020 Not Applicable    Essential hypertension [I10] 09/07/2020 Yes      Problems Resolved During this Admission:       Discharged Condition: good    Disposition: Home or Self Care    Follow Up:   Follow-up Information     Graciela Mercer NP. Go on 2/9/2022.    Specialty: Family Medicine  Why: Primary care appointment at 1:45 PM  Contact information:  [address]  [facility] [address] [phone]                       Patient Instructions:      Ambulatory referral/consult to Internal Medicine   Standing Status: Future   Referral Priority: Routine Referral Type: Consultation   Referral Reason: Specialty Services Required   Requested Specialty: Internal Medicine   Number of Visits Requested: 1     Ambulatory referral/consult to Sleep Disorders   Standing Status: Future   Referral Priority: Routine Referral Type: Consultation   Requested Specialty: Sleep Medicine   Number of Visits Requested: 1     Diet Cardiac     Notify your health care provider if you experience any of the following:  increased confusion or weakness     Notify your health care provider if you  experience any of the following:  persistent dizziness, light-headedness, or visual disturbances     Notify your health care provider if you experience any of the following:  worsening rash     Notify your health care provider if you experience any of the following:  severe persistent headache     Notify your health care provider if you experience any of the following:  difficulty breathing or increased cough     Notify your health care provider if you experience any of the following:  redness, tenderness, or signs of infection (pain, swelling, redness, odor or green/yellow discharge around incision site)     Notify your health care provider if you experience any of the following:  persistent nausea and vomiting or diarrhea     Notify your health care provider if you experience any of the following:  temperature >100.4     Notify your health care provider if you experience any of the following:  severe uncontrolled pain     Activity as tolerated       Significant Diagnostic Studies: Labs: All labs within the past 24 hours have been reviewed    Pending Diagnostic Studies:     None         Medications:  Reconciled Home Medications:      Medication List      START taking these medications    amoxicillin-clavulanate 875-125mg 875-125 mg per tablet  Commonly known as: AUGMENTIN  Take 1 tablet by mouth every 12 (twelve) hours. for 4 days     predniSONE 20 MG tablet  Commonly known as: DELTASONE  Take 1 tablet (20 mg total) by mouth once daily for 2 days.   Then, take a half (10 mg) tablet once daily for 2 days        CONTINUE taking these medications    ADVAIR DISKUS 100-50 mcg/dose diskus inhaler  Generic drug: fluticasone-salmeterol 100-50 mcg/dose  Inhale 1 puff into the lungs every 4 to 6 hours as needed.     albuterol 90 mcg/actuation inhaler  Commonly known as: VENTOLIN HFA  Inhale 2 puffs into the lungs every 4 (four) hours as needed for Wheezing or Shortness of Breath. Rescue     albuterol-ipratropium 2.5 mg-0.5  mg/3 mL nebulizer solution  Commonly known as: DUO-NEB  Take 3 mLs by nebulization every 6 (six) hours while awake.     aspirin 81 MG Chew  Take 1 tablet (81 mg total) by mouth once daily.     atorvastatin 80 MG tablet  Commonly known as: LIPITOR  Take 1 tablet (80 mg total) by mouth every evening.     budesonide 0.5 mg/2 mL nebulizer solution  Commonly known as: PULMICORT  Take 2 mLs (0.5 mg total) by nebulization 2 (two) times daily. Controller     cloNIDine 0.2 mg/24 hr td ptwk 0.2 mg/24 hr  Commonly known as: CATAPRES  Place 1 patch onto the skin every 7 days. THURSDAYS     cyclobenzaprine 10 MG tablet  Commonly known as: FLEXERIL  Take 1 tablet by mouth 2 (two) times a day.     diltiaZEM 360 MG Cs24  Commonly known as: TIAZAC  Take 360 mg by mouth once daily.     ergocalciferol 50,000 unit Cap  Commonly known as: ERGOCALCIFEROL  Take 50,000 Units by mouth every 7 days. THURSDAYS     FLUoxetine 40 MG capsule  Take 40 mg by mouth once daily.     furosemide 40 MG tablet  Commonly known as: LASIX  Take 40 mg by mouth 2 (two) times daily.     gabapentin 300 MG capsule  Commonly known as: NEURONTIN  Take 300 mg by mouth 2 (two) times daily.     isosorbide mononitrate 30 MG 24 hr tablet  Commonly known as: IMDUR  Take 1 tablet (30 mg total) by mouth once daily.     losartan 25 MG tablet  Commonly known as: COZAAR  Take 1 tablet (25 mg total) by mouth once daily.     metoprolol succinate 25 MG 24 hr tablet  Commonly known as: TOPROL-XL  Take 25 mg by mouth once daily.     multivitamin Tab  Take 1 tablet by mouth once daily.     pantoprazole 40 MG tablet  Commonly known as: PROTONIX  Take 1 tablet (40 mg total) by mouth once daily.     spironolactone 50 MG tablet  Commonly known as: ALDACTONE  Take 50 mg by mouth once daily.        STOP taking these medications    ALPRAZolam 2 MG Tab  Commonly known as: XANAX     estradioL 0.01 % (0.1 mg/gram) vaginal cream  Commonly known as: ESTRACE     HYDROcodone-acetaminophen   mg per tablet  Commonly known as: NORCO     metFORMIN 500 MG tablet  Commonly known as: GLUCOPHAGE     metoprolol tartrate 25 MG tablet  Commonly known as: LOPRESSOR     zolpidem 10 mg Tab  Commonly known as: AMBIEN            Indwelling Lines/Drains at time of discharge:   Lines/Drains/Airways     None                 Time spent on the discharge of patient: 35 minutes         Sanjay Mcgill MD  Department of Hospital Medicine  [facility]

## 2022-02-10 NOTE — HOSPITAL COURSE
Severe morbid obesity and COLIN/OHS contributing to episode of acute hypercapneic respiratory failure Patient continued on Bipap 22/12 QHS. Patient without home Bipap/CPAP machine, SW consult placed to asssit with possibly obtaining prior to discharge. However, patient will need formal sleep study.     ENT consulted, no intervention recommended. Pharyngitis improved. BCx 2/3 with coag negative staph. Discontinue vanc/zosyn, and deescalated to PO Augmentin. Discharged on six day prednisone taper (40 x2, 20 x2, 10 x2) per ENT. Follow up requested with primary care and sleep medicine.

## 2022-02-14 ENCOUNTER — TELEPHONE (OUTPATIENT)
Dept: PULMONOLOGY | Facility: CLINIC | Age: 42
End: 2022-02-14
Payer: MEDICAID

## 2022-02-14 NOTE — TELEPHONE ENCOUNTER
The patient had her sleep study approved by [employer].  The order number was given and we will try to get the study done.

## 2022-02-17 DIAGNOSIS — G47.33 OSA (OBSTRUCTIVE SLEEP APNEA): Primary | ICD-10-CM

## 2022-03-10 ENCOUNTER — PROCEDURE VISIT (OUTPATIENT)
Dept: SLEEP MEDICINE | Facility: HOSPITAL | Age: 42
End: 2022-03-10
Attending: INTERNAL MEDICINE
Payer: MEDICAID

## 2022-03-10 DIAGNOSIS — G47.33 OSA (OBSTRUCTIVE SLEEP APNEA): ICD-10-CM

## 2022-03-10 PROCEDURE — 95811 POLYSOM 6/>YRS CPAP 4/> PARM: CPT

## 2022-03-11 NOTE — PATIENT INSTRUCTIONS
The patient had a split night sleep study performed, and was advised to schedule a follow-up with Dr. Mcgill.

## 2022-03-15 ENCOUNTER — TELEPHONE (OUTPATIENT)
Dept: PULMONOLOGY | Facility: HOSPITAL | Age: 42
End: 2022-03-15
Payer: MEDICAID

## 2022-03-15 DIAGNOSIS — G47.33 OSA (OBSTRUCTIVE SLEEP APNEA): Primary | ICD-10-CM

## 2022-04-19 DIAGNOSIS — G47.33 OSA (OBSTRUCTIVE SLEEP APNEA): Primary | ICD-10-CM

## 2022-10-11 ENCOUNTER — HOSPITAL ENCOUNTER (EMERGENCY)
Facility: HOSPITAL | Age: 42
Discharge: HOME | End: 2022-10-11
Attending: EMERGENCY MEDICINE
Payer: MEDICAID

## 2022-10-11 VITALS
SYSTOLIC BLOOD PRESSURE: 140 MMHG | BODY MASS INDEX: 45.99 KG/M2 | DIASTOLIC BLOOD PRESSURE: 84 MMHG | OXYGEN SATURATION: 95 % | RESPIRATION RATE: 15 BRPM | HEART RATE: 76 BPM | WEIGHT: 293 LBS | HEIGHT: 67 IN | TEMPERATURE: 98 F

## 2022-10-11 DIAGNOSIS — R07.9 CHEST PAIN, UNSPECIFIED TYPE: Primary | ICD-10-CM

## 2022-10-11 DIAGNOSIS — R07.89 CHEST DISCOMFORT: ICD-10-CM

## 2022-10-11 LAB
ALBUMIN SERPL BCP-MCNC: 3.6 G/DL (ref 3.5–5.2)
ALP SERPL-CCNC: 133 U/L (ref 55–135)
ALT SERPL W/O P-5'-P-CCNC: 32 U/L (ref 10–44)
ANION GAP SERPL CALC-SCNC: 4 MMOL/L (ref 8–16)
AST SERPL-CCNC: 25 U/L (ref 10–40)
BASOPHILS # BLD AUTO: 0.04 K/UL (ref 0–0.2)
BASOPHILS NFR BLD: 0.4 % (ref 0–1.9)
BILIRUB SERPL-MCNC: 1.4 MG/DL (ref 0.1–1)
BNP SERPL-MCNC: 22 PG/ML (ref 0–99)
BUN SERPL-MCNC: 13 MG/DL (ref 6–20)
CALCIUM SERPL-MCNC: 9.1 MG/DL (ref 8.7–10.5)
CHLORIDE SERPL-SCNC: 102 MMOL/L (ref 95–110)
CO2 SERPL-SCNC: 31 MMOL/L (ref 23–29)
CREAT SERPL-MCNC: 1.1 MG/DL (ref 0.5–1.4)
DIFFERENTIAL METHOD: ABNORMAL
EOSINOPHIL # BLD AUTO: 0.3 K/UL (ref 0–0.5)
EOSINOPHIL NFR BLD: 3 % (ref 0–8)
ERYTHROCYTE [DISTWIDTH] IN BLOOD BY AUTOMATED COUNT: 13.6 % (ref 11.5–14.5)
EST. GFR  (NO RACE VARIABLE): >60 ML/MIN/1.73 M^2
GLUCOSE SERPL-MCNC: 117 MG/DL (ref 70–110)
HCT VFR BLD AUTO: 46 % (ref 37–48.5)
HGB BLD-MCNC: 14.3 G/DL (ref 12–16)
IMM GRANULOCYTES # BLD AUTO: 0.03 K/UL (ref 0–0.04)
IMM GRANULOCYTES NFR BLD AUTO: 0.3 % (ref 0–0.5)
INR PPP: 1.1
LYMPHOCYTES # BLD AUTO: 2 K/UL (ref 1–4.8)
LYMPHOCYTES NFR BLD: 21.6 % (ref 18–48)
MAGNESIUM SERPL-MCNC: 2 MG/DL (ref 1.6–2.6)
MCH RBC QN AUTO: 28 PG (ref 27–31)
MCHC RBC AUTO-ENTMCNC: 31.1 G/DL (ref 32–36)
MCV RBC AUTO: 90 FL (ref 82–98)
MONOCYTES # BLD AUTO: 0.6 K/UL (ref 0.3–1)
MONOCYTES NFR BLD: 6.6 % (ref 4–15)
NEUTROPHILS # BLD AUTO: 6.4 K/UL (ref 1.8–7.7)
NEUTROPHILS NFR BLD: 68.1 % (ref 38–73)
NRBC BLD-RTO: 0 /100 WBC
PLATELET # BLD AUTO: 342 K/UL (ref 150–450)
PMV BLD AUTO: 10.4 FL (ref 9.2–12.9)
POTASSIUM SERPL-SCNC: 3.8 MMOL/L (ref 3.5–5.1)
PROT SERPL-MCNC: 8 G/DL (ref 6–8.4)
PROTHROMBIN TIME: 13 SEC (ref 11.4–13.7)
RBC # BLD AUTO: 5.11 M/UL (ref 4–5.4)
SODIUM SERPL-SCNC: 137 MMOL/L (ref 136–145)
TROPONIN I SERPL DL<=0.01 NG/ML-MCNC: <0.03 NG/ML
TROPONIN I SERPL DL<=0.01 NG/ML-MCNC: <0.03 NG/ML
TSH SERPL DL<=0.005 MIU/L-ACNC: 2.02 UIU/ML (ref 0.34–5.6)
WBC # BLD AUTO: 9.35 K/UL (ref 3.9–12.7)

## 2022-10-11 PROCEDURE — 83880 ASSAY OF NATRIURETIC PEPTIDE: CPT | Performed by: NURSE PRACTITIONER

## 2022-10-11 PROCEDURE — 99284 EMERGENCY DEPT VISIT MOD MDM: CPT | Mod: 25

## 2022-10-11 PROCEDURE — 93010 EKG 12-LEAD: ICD-10-PCS | Mod: ,,, | Performed by: SPECIALIST

## 2022-10-11 PROCEDURE — 93005 ELECTROCARDIOGRAM TRACING: CPT | Performed by: SPECIALIST

## 2022-10-11 PROCEDURE — 85610 PROTHROMBIN TIME: CPT | Performed by: NURSE PRACTITIONER

## 2022-10-11 PROCEDURE — 85025 COMPLETE CBC W/AUTO DIFF WBC: CPT | Performed by: NURSE PRACTITIONER

## 2022-10-11 PROCEDURE — 84484 ASSAY OF TROPONIN QUANT: CPT | Performed by: EMERGENCY MEDICINE

## 2022-10-11 PROCEDURE — 84484 ASSAY OF TROPONIN QUANT: CPT | Mod: 91 | Performed by: NURSE PRACTITIONER

## 2022-10-11 PROCEDURE — 84443 ASSAY THYROID STIM HORMONE: CPT | Performed by: EMERGENCY MEDICINE

## 2022-10-11 PROCEDURE — 80053 COMPREHEN METABOLIC PANEL: CPT | Performed by: NURSE PRACTITIONER

## 2022-10-11 PROCEDURE — 83735 ASSAY OF MAGNESIUM: CPT | Performed by: EMERGENCY MEDICINE

## 2022-10-11 PROCEDURE — 93010 ELECTROCARDIOGRAM REPORT: CPT | Mod: 76,59,, | Performed by: SPECIALIST

## 2022-10-11 PROCEDURE — 93010 ELECTROCARDIOGRAM REPORT: CPT | Mod: ,,, | Performed by: SPECIALIST

## 2022-10-11 RX ORDER — ZOLPIDEM TARTRATE 10 MG/1
10 TABLET ORAL NIGHTLY PRN
COMMUNITY
Start: 2022-10-07

## 2022-10-11 RX ORDER — METOPROLOL TARTRATE 100 MG/1
200 TABLET ORAL 2 TIMES DAILY
COMMUNITY
Start: 2022-10-03

## 2022-10-11 RX ORDER — NAPROXEN SODIUM 220 MG/1
324 TABLET, FILM COATED ORAL ONCE
Status: DISCONTINUED | OUTPATIENT
Start: 2022-10-11 | End: 2022-10-11

## 2022-10-11 RX ORDER — ALPRAZOLAM 2 MG/1
2 TABLET ORAL 3 TIMES DAILY
COMMUNITY
Start: 2022-10-01

## 2022-10-11 RX ORDER — FUROSEMIDE 40 MG/1
40 TABLET ORAL DAILY
COMMUNITY

## 2022-10-11 RX ORDER — DILTIAZEM HYDROCHLORIDE 120 MG/1
120 TABLET, FILM COATED ORAL 2 TIMES DAILY
COMMUNITY
Start: 2022-10-03

## 2022-10-11 NOTE — FIRST PROVIDER EVALUATION
"Medical screening examination initiated.  I have conducted a focused provider triage encounter, findings are as follows:    Brief history of present illness:  chest pain     Vitals:    10/11/22 1645   BP: (!) 173/103   BP Location: Left arm   Patient Position: Sitting   Pulse: 94   Resp: 20   Temp: 98.3 °F (36.8 °C)   TempSrc: Oral   SpO2: (!) 94%   Weight: (!) 199.6 kg (440 lb)   Height: 5' 7" (1.702 m)       Pertinent physical exam: chest pain off and on since yesterday sent by ms villalpando for further evaluation     Brief workup plan: labs ekg cxr     Preliminary workup initiated; this workup will be continued and followed by the physician or advanced practice provider that is assigned to the patient when roomed.  "

## 2022-10-12 NOTE — DISCHARGE INSTRUCTIONS
Follow-up with her cardiologist as scheduled.  Return to emergency department problems persist worsen or additional concerns.

## 2022-10-12 NOTE — ED PROVIDER NOTES
"Encounter Date: 10/11/2022       History     Chief Complaint   Patient presents with    Chest Pain     Pt sent from MD for complaints of chest pain, tachycardia and being "clammy"     41-year-old female with history of hypertension, hyperlipidemia, obesity, obstructive sleep apnea anxiety.  Patient followed by Dr. Mota cardiology.  Patient states is scheduled to follow-up with electrophysiology in a week and half.  She presents emergency department after she was told to come to ED by her primary care provider.  Patient states that over last 2 days she has had left substernal chest pressure and heaviness.  This has been associated with burning to her throat area as well.  She stated associated with mild shortness of breath.  No abdominal pain, no nausea, no vomiting, no other constitutional symptoms.  Patient states symptoms are worse with laying flat.  She denies diaphoresis, currently this time she denies any chest pain or symptoms that was known throughout today.  She was only here at the advice of her primary care provider.  Patient states has had recent cardiac catheterization which showed no evidence of ischemic cardiac disease or blockages.    Review of patient's allergies indicates:  No Known Allergies  Past Medical History:   Diagnosis Date    Anxiety     COVID-19     Depression     Heart attack     Hyperlipidemia     Hypertension     Obesity     Obesity      Past Surgical History:   Procedure Laterality Date    CHOLECYSTECTOMY      HYSTERECTOMY       Family History   Problem Relation Age of Onset    No Known Problems Mother     No Known Problems Father      Social History     Tobacco Use    Smoking status: Never   Substance Use Topics    Alcohol use: No     Review of Systems   Constitutional:  Negative for fever.   HENT:  Negative for sore throat.    Respiratory:  Positive for chest tightness. Negative for shortness of breath.    Cardiovascular:  Negative for chest pain.   Gastrointestinal:  Negative for " nausea.   Genitourinary:  Negative for dysuria.   Musculoskeletal:  Negative for back pain.   Skin:  Negative for rash.   Neurological:  Negative for weakness.   Hematological:  Does not bruise/bleed easily.     Physical Exam     Initial Vitals [10/11/22 1645]   BP Pulse Resp Temp SpO2   (!) 173/103 94 20 98.3 °F (36.8 °C) (!) 94 %      MAP       --         Physical Exam    Nursing note and vitals reviewed.  Constitutional: She appears well-developed and well-nourished.   HENT:   Head: Normocephalic and atraumatic.   Nose: Nose normal.   Mouth/Throat: Oropharynx is clear and moist.   Eyes: Conjunctivae and EOM are normal. Pupils are equal, round, and reactive to light.   Neck: Neck supple. No thyromegaly present. No tracheal deviation present.   Normal range of motion.  Cardiovascular:  Normal rate, regular rhythm, normal heart sounds and intact distal pulses.     Exam reveals no gallop and no friction rub.       No murmur heard.  Pulmonary/Chest: No stridor. No respiratory distress.   Course bilateral breath sounds no adventitious sounds   Abdominal: Abdomen is soft. Bowel sounds are normal. She exhibits no mass. There is no rebound and no guarding.   Musculoskeletal:         General: No edema. Normal range of motion.      Cervical back: Normal range of motion and neck supple.     Lymphadenopathy:     She has no cervical adenopathy.   Neurological: She is alert and oriented to person, place, and time. She has normal strength and normal reflexes. GCS score is 15. GCS eye subscore is 4. GCS verbal subscore is 5. GCS motor subscore is 6.   Skin: Skin is warm and dry. Capillary refill takes less than 2 seconds.   Psychiatric: She has a normal mood and affect.       ED Course   Procedures  Labs Reviewed   CBC W/ AUTO DIFFERENTIAL - Abnormal; Notable for the following components:       Result Value    MCHC 31.1 (*)     All other components within normal limits   COMPREHENSIVE METABOLIC PANEL - Abnormal; Notable for the  following components:    CO2 31 (*)     Glucose 117 (*)     Total Bilirubin 1.4 (*)     Anion Gap 4 (*)     All other components within normal limits   B-TYPE NATRIURETIC PEPTIDE   TROPONIN I   PROTIME-INR   TSH   MAGNESIUM   TROPONIN I   DRUG SCREEN PANEL, URINE EMERGENCY        ECG Results              EKG 12-lead (In process)  Result time 10/11/22 17:20:09      In process by Interface, Lab In [facility] (10/11/22 17:20:09)                   Narrative:    Test Reason : R07.9,    Vent. Rate : 095 BPM     Atrial Rate : 095 BPM     P-R Int : 164 ms          QRS Dur : 100 ms      QT Int : 366 ms       P-R-T Axes : 000 157 158 degrees     QTc Int : 459 ms     Suspect arm lead reversal, interpretation assumes no reversal  Normal sinus rhythm  Lateral infarct ,age undetermined  Abnormal ECG  When compared with ECG of 02-FEB-2022 19:15,  The axis Shifted right  Lateral infarct is now Present  Nonspecific T wave abnormality now evident in Lateral leads    Referred By: [email or web address]   SELF           Confirmed By:                                   Imaging Results              X-Ray Chest AP Portable (Final result)  Result time 10/11/22 17:58:23      Final result by Beto Osborn MD (10/11/22 17:58:23)                   Narrative:    Chest single view    Clinical data:Chest pain. Comparison to February 2, 2022    FINDINGS: AP view of the chest demonstrates no cardiac, pulmonary, or osseous abnormalities.    IMPRESSION:  1. Normal chest single view.    Electronically signed by:  Beto Osborn MD  10/11/2022 5:58 PM CDT Workstation: [id number]                                     Medications - No data to display  Medical Decision Making:   Initial Assessment:    41-year-old female with history of hypertension, hyperlipidemia, obesity, obstructive sleep apnea anxiety.  Patient followed by Dr. Svetlana byrnes.  Patient states is scheduled to follow-up with electrophysiology in a week and half.  She presents emergency department  after she was told to come to ED by her primary care provider.  Patient states that over last 2 days she has had left substernal chest pressure and heaviness.  This has been associated with burning to her throat area as well.  She stated associated with mild shortness of breath.  No abdominal pain, no nausea, no vomiting, no other constitutional symptoms.  Patient states symptoms are worse with laying flat.  She denies diaphoresis, currently this time she denies any chest pain or symptoms that was known throughout today.  She was only here at the advice of her primary care provider.  Patient states has had recent cardiac catheterization which showed no evidence of ischemic cardiac disease or blockages.    Differential Diagnosis:   Acute coronary syndrome, dyspepsia, gastroesophageal reflux, musculoskeletal chest pain, pulmonary embolism  Clinical Tests:   Lab Tests: Ordered and Reviewed  Radiological Study: Ordered and Reviewed  Medical Tests: Ordered and Reviewed           ED Course as of 10/11/22 2231   Tue Oct 11, 2022   2228 Patient seen evaluated emergency department.  Currently this time patient had serial cardiac markers which showed no evidence of elevated troponin over a period of 6 hours.  EKG showed normal sinus rhythm rate of 81, nonspecific T-wave abnormality.  No significant interval changes, normal axis.  Patient labs otherwise were unremarkable including CMP, CBC, magnesium and BNP.  Chest x-ray showed no infiltrates or cardiomegaly.  Patient at this time was told to follow up with her cardiologist as scheduled.  Patient also given instructions as to possible other related etiologies were chest discomfort including pulmonary hypertension, gastroesophageal reflux, gastritis, ACS.  Patient this time will be discharged with close follow-up.  Also given GI referral as well. [RM]   2229 X-Ray Chest AP Portable [RM]      ED Course User Index  [RM] Sky Herrera MD                 Clinical Impression:    Final diagnoses:  [R07.89] Chest discomfort  [R07.9] Chest pain, unspecified type (Primary)        ED Disposition Condition    Discharge Stable          ED Prescriptions    None       Follow-up Information    None          Sky Herrera MD  10/11/22 1380

## 2023-01-24 ENCOUNTER — TELEPHONE (OUTPATIENT)
Dept: BARIATRICS | Facility: CLINIC | Age: 43
End: 2023-01-24
Payer: MEDICAID

## 2023-01-24 ENCOUNTER — PATIENT MESSAGE (OUTPATIENT)
Dept: BARIATRICS | Facility: CLINIC | Age: 43
End: 2023-01-24
Payer: MEDICAID

## 2023-01-24 NOTE — TELEPHONE ENCOUNTER
----- Message from Suellen Vasquez sent at 1/24/2023  9:18 AM CST -----  Contact: Patient  Type:  Patient Call          Who Called: Patient         Does the patient know what this is regarding?: Requesting a call back pt have a few questions before scheduling ; please advise           Would the patient rather a call back or a response via [facility]?call           Best Call Back Number: [phone]             Additional Information:

## 2023-09-01 ENCOUNTER — HOSPITAL ENCOUNTER (EMERGENCY)
Facility: HOSPITAL | Age: 43
Discharge: HOME | End: 2023-09-01
Attending: EMERGENCY MEDICINE
Payer: MEDICAID

## 2023-09-01 VITALS
HEART RATE: 84 BPM | BODY MASS INDEX: 45.99 KG/M2 | RESPIRATION RATE: 18 BRPM | DIASTOLIC BLOOD PRESSURE: 80 MMHG | OXYGEN SATURATION: 95 % | HEIGHT: 67 IN | SYSTOLIC BLOOD PRESSURE: 144 MMHG | TEMPERATURE: 98 F | WEIGHT: 293 LBS
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
## 2023-09-01 DIAGNOSIS — I10 HTN (HYPERTENSION): ICD-10-CM

## 2023-09-01 LAB
ALBUMIN SERPL BCP-MCNC: 3.7 G/DL (ref 3.5–5.2)
ALP SERPL-CCNC: 127 U/L (ref 55–135)
ALT SERPL W/O P-5'-P-CCNC: 25 U/L (ref 10–44)
ANION GAP SERPL CALC-SCNC: 7 MMOL/L (ref 8–16)
APTT PPP: 28.3 SEC (ref 21–32)
AST SERPL-CCNC: 21 U/L (ref 10–40)
BASOPHILS # BLD AUTO: 0.06 K/UL (ref 0–0.2)
BASOPHILS NFR BLD: 0.6 % (ref 0–1.9)
BILIRUB SERPL-MCNC: 1.5 MG/DL (ref 0.1–1)
BUN SERPL-MCNC: 13 MG/DL (ref 6–20)
CALCIUM SERPL-MCNC: 9 MG/DL (ref 8.7–10.5)
CHLORIDE SERPL-SCNC: 104 MMOL/L (ref 95–110)
CO2 SERPL-SCNC: 27 MMOL/L (ref 23–29)
CREAT SERPL-MCNC: 0.9 MG/DL (ref 0.5–1.4)
DIFFERENTIAL METHOD: NORMAL
EOSINOPHIL # BLD AUTO: 0.2 K/UL (ref 0–0.5)
EOSINOPHIL NFR BLD: 2.5 % (ref 0–8)
ERYTHROCYTE [DISTWIDTH] IN BLOOD BY AUTOMATED COUNT: 12.8 % (ref 11.5–14.5)
EST. GFR  (NO RACE VARIABLE): >60 ML/MIN/1.73 M^2
GLUCOSE SERPL-MCNC: 116 MG/DL (ref 70–110)
HCT VFR BLD AUTO: 45.8 % (ref 37–48.5)
HGB BLD-MCNC: 14.9 G/DL (ref 12–16)
IMM GRANULOCYTES # BLD AUTO: 0.02 K/UL (ref 0–0.04)
IMM GRANULOCYTES NFR BLD AUTO: 0.2 % (ref 0–0.5)
INR PPP: 1 (ref 0.8–1.2)
LYMPHOCYTES # BLD AUTO: 2.3 K/UL (ref 1–4.8)
LYMPHOCYTES NFR BLD: 23.7 % (ref 18–48)
MCH RBC QN AUTO: 29.4 PG (ref 27–31)
MCHC RBC AUTO-ENTMCNC: 32.5 G/DL (ref 32–36)
MCV RBC AUTO: 91 FL (ref 82–98)
MONOCYTES # BLD AUTO: 0.4 K/UL (ref 0.3–1)
MONOCYTES NFR BLD: 4.1 % (ref 4–15)
NEUTROPHILS # BLD AUTO: 6.7 K/UL (ref 1.8–7.7)
NEUTROPHILS NFR BLD: 68.9 % (ref 38–73)
NRBC BLD-RTO: 0 /100 WBC
PLATELET # BLD AUTO: 354 K/UL (ref 150–450)
PMV BLD AUTO: 9.9 FL (ref 9.2–12.9)
POTASSIUM SERPL-SCNC: 3.7 MMOL/L (ref 3.5–5.1)
PROT SERPL-MCNC: 8.3 G/DL (ref 6–8.4)
PROTHROMBIN TIME: 10.7 SEC (ref 9–12.5)
RBC # BLD AUTO: 5.06 M/UL (ref 4–5.4)
SODIUM SERPL-SCNC: 138 MMOL/L (ref 136–145)
TROPONIN I SERPL HS-MCNC: 5.1 PG/ML (ref 0–14.9)
WBC # BLD AUTO: 9.7 K/UL (ref 3.9–12.7)

## 2023-09-01 PROCEDURE — 93010 EKG 12-LEAD: ICD-10-PCS | Mod: ,,, | Performed by: GENERAL PRACTICE

## 2023-09-01 PROCEDURE — 63600175 PHARM REV CODE 636 W HCPCS: Performed by: STUDENT IN AN ORGANIZED HEALTH CARE EDUCATION/TRAINING PROGRAM

## 2023-09-01 PROCEDURE — 80053 COMPREHEN METABOLIC PANEL: CPT | Performed by: STUDENT IN AN ORGANIZED HEALTH CARE EDUCATION/TRAINING PROGRAM

## 2023-09-01 PROCEDURE — 99285 EMERGENCY DEPT VISIT HI MDM: CPT | Mod: 25

## 2023-09-01 PROCEDURE — 85610 PROTHROMBIN TIME: CPT | Performed by: STUDENT IN AN ORGANIZED HEALTH CARE EDUCATION/TRAINING PROGRAM

## 2023-09-01 PROCEDURE — 96375 TX/PRO/DX INJ NEW DRUG ADDON: CPT

## 2023-09-01 PROCEDURE — 85730 THROMBOPLASTIN TIME PARTIAL: CPT | Performed by: STUDENT IN AN ORGANIZED HEALTH CARE EDUCATION/TRAINING PROGRAM

## 2023-09-01 PROCEDURE — 25000003 PHARM REV CODE 250: Performed by: EMERGENCY MEDICINE

## 2023-09-01 PROCEDURE — 93005 ELECTROCARDIOGRAM TRACING: CPT | Performed by: GENERAL PRACTICE

## 2023-09-01 PROCEDURE — 25000003 PHARM REV CODE 250: Performed by: STUDENT IN AN ORGANIZED HEALTH CARE EDUCATION/TRAINING PROGRAM

## 2023-09-01 PROCEDURE — 93010 ELECTROCARDIOGRAM REPORT: CPT | Mod: ,,, | Performed by: GENERAL PRACTICE

## 2023-09-01 PROCEDURE — 85025 COMPLETE CBC W/AUTO DIFF WBC: CPT | Performed by: STUDENT IN AN ORGANIZED HEALTH CARE EDUCATION/TRAINING PROGRAM

## 2023-09-01 PROCEDURE — 96374 THER/PROPH/DIAG INJ IV PUSH: CPT

## 2023-09-01 PROCEDURE — 84484 ASSAY OF TROPONIN QUANT: CPT | Performed by: STUDENT IN AN ORGANIZED HEALTH CARE EDUCATION/TRAINING PROGRAM

## 2023-09-01 RX ORDER — CLONIDINE 0.3 MG/24H
1 PATCH, EXTENDED RELEASE TRANSDERMAL
Status: DISCONTINUED | OUTPATIENT
Start: 2023-09-02 | End: 2023-09-01

## 2023-09-01 RX ORDER — ONDANSETRON 2 MG/ML
4 INJECTION INTRAMUSCULAR; INTRAVENOUS
Status: COMPLETED | OUTPATIENT
Start: 2023-09-01 | End: 2023-09-01

## 2023-09-01 RX ORDER — ACETAMINOPHEN 500 MG
1000 TABLET ORAL
Status: COMPLETED | OUTPATIENT
Start: 2023-09-01 | End: 2023-09-01

## 2023-09-01 RX ORDER — CLONIDINE 0.3 MG/24H
1 PATCH, EXTENDED RELEASE TRANSDERMAL
Status: DISCONTINUED | OUTPATIENT
Start: 2023-09-01 | End: 2023-09-01 | Stop reason: HOSPADM

## 2023-09-01 RX ORDER — MORPHINE SULFATE 4 MG/ML
4 INJECTION, SOLUTION INTRAMUSCULAR; INTRAVENOUS
Status: COMPLETED | OUTPATIENT
Start: 2023-09-01 | End: 2023-09-01

## 2023-09-01 RX ADMIN — ACETAMINOPHEN 1000 MG: 500 TABLET ORAL at 07:09

## 2023-09-01 RX ADMIN — ONDANSETRON 4 MG: 2 INJECTION, SOLUTION INTRAMUSCULAR; INTRAVENOUS at 07:09

## 2023-09-01 RX ADMIN — CLONIDINE 1 PATCH: 0.3 PATCH TRANSDERMAL at 06:09

## 2023-09-01 RX ADMIN — MORPHINE SULFATE 4 MG: 4 INJECTION, SOLUTION INTRAMUSCULAR; INTRAVENOUS at 07:09
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
## 2024-01-16 NOTE — CONSULTS
[facility]  Cardiology  Consult Note    Patient Name: Maribel Hernandez  MRN: [id number]  Admission Date: 1/11/2022  Hospital Length of Stay: 0 days  Code Status: Full Code   Attending Provider: Vincent Gaitan MD  Consulting Provider: Vincent Gaitan MD  Primary Care Physician: Graciela Mercer NP  Principal Problem:Chest pain    Patient information was obtained from patient and ER records.     Inpatient consult to Cardiology  Consult performed by: Vincent Gaitan MD  Consult ordered by: Sabina Grant MD  Reason for consult: chest pain        Subjective:   40 yo female with h/o abnormal stress test here with chest pain. Continues to chest tightness even at rest. No ischemic changes on EKG, negative troponins.    Past Medical History:   Diagnosis Date    Anxiety     COVID-19     Depression     Heart attack     Hyperlipidemia     Hypertension     Obesity     Obesity        Past Surgical History:   Procedure Laterality Date    CHOLECYSTECTOMY      HYSTERECTOMY         Review of patient's allergies indicates:  No Known Allergies    No current facility-administered medications on file prior to encounter.     Current Outpatient Medications on File Prior to Encounter   Medication Sig    albuterol (VENTOLIN HFA) 90 mcg/actuation inhaler Inhale 2 puffs into the lungs every 4 (four) hours as needed for Wheezing or Shortness of Breath. Rescue    albuterol-ipratropium (DUO-NEB) 2.5 mg-0.5 mg/3 mL nebulizer solution Take 3 mLs by nebulization every 6 (six) hours while awake.    alprazolam (XANAX) 2 MG Tab Take 2 mg by mouth 3 (three) times daily as needed (anxiety).     aspirin 81 MG Chew Take 1 tablet (81 mg total) by mouth once daily.    atorvastatin (LIPITOR) 80 MG tablet Take 1 tablet (80 mg total) by mouth every evening.    cloNIDine 0.2 mg/24 hr td ptwk (CATAPRES) 0.2 mg/24 hr Place 1 patch onto the skin every 7 days.    cyclobenzaprine (FLEXERIL) 10 MG tablet Take 1 tablet by  mouth 2 (two) times a day.    diltiaZEM (TIAZAC) 360 MG Cs24 Take 360 mg by mouth once daily.    ergocalciferol (ERGOCALCIFEROL) 50,000 unit Cap Take 50,000 Units by mouth every 7 days.    fluoxetine (PROZAC) 40 MG capsule Take 40 mg by mouth once daily.    furosemide (LASIX) 40 MG tablet Take 40 mg by mouth 2 (two) times daily.    hydrocodone-acetaminophen 10-325mg (NORCO)  mg Tab Take 1 tablet by mouth every 8 (eight) hours as needed for Pain.    isosorbide mononitrate (IMDUR) 30 MG 24 hr tablet Take 1 tablet (30 mg total) by mouth once daily.    losartan (COZAAR) 25 MG tablet Take 1 tablet (25 mg total) by mouth once daily.    metoprolol succinate (TOPROL-XL) 25 MG 24 hr tablet Take 25 mg by mouth once daily.    multivitamin Tab Take 1 tablet by mouth once daily.    pantoprazole (PROTONIX) 40 MG tablet Take 1 tablet (40 mg total) by mouth once daily.    zolpidem (AMBIEN) 10 mg Tab Take 1 tablet by mouth every evening.    [DISCONTINUED] ascorbic acid, vitamin C, (VITAMIN C) 500 MG tablet Take 1 tablet (500 mg total) by mouth 2 (two) times daily.    [DISCONTINUED] diltiaZEM (TIAZAC) 240 MG Cs24 diltiazem  mg capsule,extended release 24 hr    [DISCONTINUED] metFORMIN (GLUCOPHAGE) 500 MG tablet Take 500 mg by mouth 2 (two) times daily.    [DISCONTINUED] metoprolol tartrate (LOPRESSOR) 50 MG tablet Take 100 mg by mouth 2 (two) times daily.     [DISCONTINUED] spironolactone (ALDACTONE) 50 MG tablet Take 50 mg by mouth once daily.    [DISCONTINUED] vitamin D (VITAMIN D3) 1000 units Tab Take 1 tablet (1,000 Units total) by mouth once daily.     Family History     Problem Relation (Age of Onset)    No Known Problems Mother, Father        Tobacco Use    Smoking status: Never Smoker    Smokeless tobacco: Not on file   Substance and Sexual Activity    Alcohol use: No    Drug use: Not on file    Sexual activity: Yes     Birth control/protection: None     Review of Systems   All other systems  reviewed and are negative.    Objective:     Vital Signs (Most Recent):  Temp: 97.8 °F (36.6 °C) (01/11/22 1712)  Pulse: 69 (01/11/22 1712)  Resp: 20 (01/11/22 1712)  BP: 130/65 (01/11/22 1712)  SpO2: 95 % (01/11/22 1712) Vital Signs (24h Range):  Temp:  [97.7 °F (36.5 °C)-97.9 °F (36.6 °C)] 97.8 °F (36.6 °C)  Pulse:  [65-94] 69  Resp:  [16-27] 20  SpO2:  [92 %-98 %] 95 %  BP: (124-218)/() 130/65     Weight: (!) 201.6 kg (444 lb 7.2 oz)  Body mass index is 69.61 kg/m².    SpO2: 95 %  O2 Device (Oxygen Therapy): nasal cannula    No intake or output data in the 24 hours ending 01/11/22 1814    Lines/Drains/Airways     Peripheral Intravenous Line                 Peripheral IV - Single Lumen 01/11/22 0730 20 G Right Antecubital <1 day                Physical Exam  Vitals reviewed.   Constitutional:       Appearance: She is obese.   HENT:      Mouth/Throat:      Mouth: Mucous membranes are moist.   Eyes:      Pupils: Pupils are equal, round, and reactive to light.   Cardiovascular:      Rate and Rhythm: Normal rate and regular rhythm.      Heart sounds: No murmur heard.  No friction rub. No gallop.    Pulmonary:      Effort: Pulmonary effort is normal.      Comments: Limited air entry b/l  Abdominal:      General: Abdomen is flat.      Palpations: Abdomen is soft.   Musculoskeletal:         General: Normal range of motion.   Skin:     General: Skin is warm and dry.   Neurological:      General: No focal deficit present.      Mental Status: She is alert and oriented to person, place, and time.   Psychiatric:         Mood and Affect: Mood normal.         Significant Labs:   BMP:   Recent Labs   Lab 01/11/22  0730   *      K 3.9      CO2 29   BUN 12   CREATININE 0.9   CALCIUM 8.4*    and CBC   Recent Labs   Lab 01/11/22  0730   WBC 8.30  8.30   HGB 13.6  13.6   HCT 44.0  44.0     317       Significant Imaging: Echocardiogram:   Transthoracic echo (TTE) complete (C[facility] Only):   Results for  orders placed or performed during the hospital encounter of 11/13/21   Echo   Result Value Ref Range    BSA 3.05 m2    TDI SEPTAL 0.09 m/s    LV LATERAL E/E' RATIO 8.55 m/s    LV SEPTAL E/E' RATIO 10.44 m/s    AORTIC VALVE CUSP SEPERATION 2.15 cm    TDI LATERAL 0.11 m/s    PV PEAK VELOCITY 115.70 cm/s    LVIDd 5.52 3.5 - 6.0 cm    IVS 1.23 (A) 0.6 - 1.1 cm    Posterior Wall 1.33 (A) 0.6 - 1.1 cm    Ao root annulus 3.02 cm    LVIDs 3.32 2.1 - 4.0 cm    FS 40 28 - 44 %    LV mass 299.55 g    RVDD 383.00 cm    Left Ventricle Relative Wall Thickness 0.48 cm    AV mean gradient 6 mmHg    AV valve area 4.16 cm2    AV Velocity Ratio 88.51     AV index (prosthetic) 0.92     E/A ratio 1.27     Mean e' 0.10 m/s    E wave deceleration time 173.51 msec    IVRT 116.03 msec    Pulm vein S/D ratio 1.15     LVOT diameter 2.40 cm    LVOT area 4.5 cm2    LVOT peak paul 135.42 m/s    LVOT peak VTI 28.93 cm    Ao peak paul 1.53 m/s    Ao VTI 31.45 cm    LVOT stroke volume 130.81 cm3    AV peak gradient 9 mmHg    E/E' ratio 9.40 m/s    MV Peak E Paul 0.94 m/s    MV Peak A Paul 0.74 m/s    PV Peak S Paul 58.34 m/s    PV Peak D Paul 50.52 m/s    LV Mass Index 107 g/m2    Right Atrial Pressure (from IVC) 3 mmHg    EF 70 %    Narrative    · Mild concentric hypertrophy and normal systolic function.  · The estimated ejection fraction is 70%.  · Normal left ventricular diastolic function.  · Mild right ventricular enlargement with normal right ventricular   systolic function.  · Mild left atrial enlargement.  · Normal central venous pressure (3 mmHg).        Assessment and Plan:     Active Diagnoses:    Diagnosis Date Noted POA    PRINCIPAL PROBLEM:  Chest pain [R07.9] 11/13/2021 Yes    Positive cardiac stress test [R94.39] 11/16/2021 Yes    Class 3 severe obesity due to excess calories with serious comorbidity and body mass index (BMI) of 60.0 to 69.9 in adult [E66.01, Z68.44] 09/07/2020 Not Applicable    Essential hypertension [I10] 09/07/2020  Yes    COLIN on CPAP [G47.33, Z99.89] 09/07/2020 Not Applicable      Problems Resolved During this Admission:       VTE Risk Mitigation (From admission, onward)         Ordered     enoxaparin injection 40 mg  Every 12 hours         01/11/22 1149     IP VTE HIGH RISK PATIENT  Once         01/11/22 1016     Place sequential compression device  Until discontinued         01/11/22 1016              Chest pain, atypical of angina with positive stress test and multiple risk factors. Will try performing angiogram tomorrow if technically feasible. Patient about high contrast and radiation requirements given her body habitus. NPO at MN.     Thank you for your consult. I will follow-up with patient. Please contact us if you have any additional questions.    Vincent Gaitan MD  Cardiology   [facility]       Yes

## 2024-02-21 ENCOUNTER — HOSPITAL ENCOUNTER (EMERGENCY)
Facility: HOSPITAL | Age: 44
Discharge: HOME | End: 2024-02-21
Attending: EMERGENCY MEDICINE
Payer: MEDICAID

## 2024-02-21 VITALS
SYSTOLIC BLOOD PRESSURE: 141 MMHG | HEART RATE: 81 BPM | DIASTOLIC BLOOD PRESSURE: 80 MMHG | RESPIRATION RATE: 16 BRPM | BODY MASS INDEX: 62.34 KG/M2 | OXYGEN SATURATION: 93 % | TEMPERATURE: 98 F | WEIGHT: 293 LBS

## 2024-02-21 DIAGNOSIS — R10.9 RIGHT FLANK PAIN: Primary | ICD-10-CM

## 2024-02-21 DIAGNOSIS — N39.0 URINARY TRACT INFECTION WITHOUT HEMATURIA, SITE UNSPECIFIED: ICD-10-CM

## 2024-02-21 LAB
ALBUMIN SERPL BCP-MCNC: 4 G/DL (ref 3.5–5.2)
ALP SERPL-CCNC: 118 U/L (ref 55–135)
ALT SERPL W/O P-5'-P-CCNC: 18 U/L (ref 10–44)
ANION GAP SERPL CALC-SCNC: 8 MMOL/L (ref 8–16)
AST SERPL-CCNC: 13 U/L (ref 10–40)
BACTERIA #/AREA URNS HPF: ABNORMAL /HPF
BASOPHILS # BLD AUTO: 0.05 K/UL (ref 0–0.2)
BASOPHILS NFR BLD: 0.5 % (ref 0–1.9)
BILIRUB SERPL-MCNC: 2 MG/DL (ref 0.1–1)
BILIRUB UR QL STRIP: NEGATIVE
BUN SERPL-MCNC: 12 MG/DL (ref 6–20)
CALCIUM SERPL-MCNC: 9.4 MG/DL (ref 8.7–10.5)
CHLORIDE SERPL-SCNC: 104 MMOL/L (ref 95–110)
CLARITY UR: CLEAR
CO2 SERPL-SCNC: 26 MMOL/L (ref 23–29)
COLOR UR: YELLOW
CREAT SERPL-MCNC: 0.9 MG/DL (ref 0.5–1.4)
DIFFERENTIAL METHOD BLD: ABNORMAL
EOSINOPHIL # BLD AUTO: 0.3 K/UL (ref 0–0.5)
EOSINOPHIL NFR BLD: 2.7 % (ref 0–8)
ERYTHROCYTE [DISTWIDTH] IN BLOOD BY AUTOMATED COUNT: 13 % (ref 11.5–14.5)
EST. GFR  (NO RACE VARIABLE): >60 ML/MIN/1.73 M^2
GLUCOSE SERPL-MCNC: 118 MG/DL (ref 70–110)
GLUCOSE UR QL STRIP: NEGATIVE
HCT VFR BLD AUTO: 44 % (ref 37–48.5)
HGB BLD-MCNC: 14 G/DL (ref 12–16)
HGB UR QL STRIP: NEGATIVE
HYALINE CASTS #/AREA URNS LPF: 9 /LPF
IMM GRANULOCYTES # BLD AUTO: 0.03 K/UL (ref 0–0.04)
IMM GRANULOCYTES NFR BLD AUTO: 0.3 % (ref 0–0.5)
KETONES UR QL STRIP: NEGATIVE
LEUKOCYTE ESTERASE UR QL STRIP: ABNORMAL
LYMPHOCYTES # BLD AUTO: 2.4 K/UL (ref 1–4.8)
LYMPHOCYTES NFR BLD: 24.2 % (ref 18–48)
MCH RBC QN AUTO: 29.2 PG (ref 27–31)
MCHC RBC AUTO-ENTMCNC: 31.8 G/DL (ref 32–36)
MCV RBC AUTO: 92 FL (ref 82–98)
MICROSCOPIC COMMENT: ABNORMAL
MONOCYTES # BLD AUTO: 0.6 K/UL (ref 0.3–1)
MONOCYTES NFR BLD: 5.6 % (ref 4–15)
NEUTROPHILS # BLD AUTO: 6.7 K/UL (ref 1.8–7.7)
NEUTROPHILS NFR BLD: 66.7 % (ref 38–73)
NITRITE UR QL STRIP: POSITIVE
NRBC BLD-RTO: 0 /100 WBC
PH UR STRIP: 6 [PH] (ref 5–8)
PLATELET # BLD AUTO: 341 K/UL (ref 150–450)
PMV BLD AUTO: 10 FL (ref 9.2–12.9)
POTASSIUM SERPL-SCNC: 3.7 MMOL/L (ref 3.5–5.1)
PROT SERPL-MCNC: 7.7 G/DL (ref 6–8.4)
PROT UR QL STRIP: ABNORMAL
RBC # BLD AUTO: 4.8 M/UL (ref 4–5.4)
RBC #/AREA URNS HPF: 3 /HPF (ref 0–4)
SODIUM SERPL-SCNC: 138 MMOL/L (ref 136–145)
SP GR UR STRIP: 1.03 (ref 1–1.03)
SQUAMOUS #/AREA URNS HPF: 3 /HPF
URN SPEC COLLECT METH UR: ABNORMAL
UROBILINOGEN UR STRIP-ACNC: NEGATIVE EU/DL
WBC # BLD AUTO: 10.01 K/UL (ref 3.9–12.7)
WBC #/AREA URNS HPF: 23 /HPF (ref 0–5)

## 2024-02-21 PROCEDURE — 96361 HYDRATE IV INFUSION ADD-ON: CPT

## 2024-02-21 PROCEDURE — 63600175 PHARM REV CODE 636 W HCPCS: Performed by: EMERGENCY MEDICINE

## 2024-02-21 PROCEDURE — 87086 URINE CULTURE/COLONY COUNT: CPT | Performed by: EMERGENCY MEDICINE

## 2024-02-21 PROCEDURE — 81001 URINALYSIS AUTO W/SCOPE: CPT | Performed by: EMERGENCY MEDICINE

## 2024-02-21 PROCEDURE — 85025 COMPLETE CBC W/AUTO DIFF WBC: CPT | Performed by: EMERGENCY MEDICINE

## 2024-02-21 PROCEDURE — 96365 THER/PROPH/DIAG IV INF INIT: CPT

## 2024-02-21 PROCEDURE — 25000003 PHARM REV CODE 250: Performed by: EMERGENCY MEDICINE

## 2024-02-21 PROCEDURE — 99285 EMERGENCY DEPT VISIT HI MDM: CPT | Mod: 25

## 2024-02-21 PROCEDURE — 80053 COMPREHEN METABOLIC PANEL: CPT | Performed by: EMERGENCY MEDICINE

## 2024-02-21 PROCEDURE — 96375 TX/PRO/DX INJ NEW DRUG ADDON: CPT

## 2024-02-21 PROCEDURE — 87077 CULTURE AEROBIC IDENTIFY: CPT | Performed by: EMERGENCY MEDICINE

## 2024-02-21 PROCEDURE — 87186 SC STD MICRODIL/AGAR DIL: CPT | Performed by: EMERGENCY MEDICINE

## 2024-02-21 RX ORDER — ONDANSETRON HYDROCHLORIDE 2 MG/ML
4 INJECTION, SOLUTION INTRAVENOUS
Status: COMPLETED | OUTPATIENT
Start: 2024-02-21 | End: 2024-02-21

## 2024-02-21 RX ORDER — HYDROMORPHONE HYDROCHLORIDE 1 MG/ML
1 INJECTION, SOLUTION INTRAMUSCULAR; INTRAVENOUS; SUBCUTANEOUS
Status: COMPLETED | OUTPATIENT
Start: 2024-02-21 | End: 2024-02-21

## 2024-02-21 RX ORDER — CEPHALEXIN 500 MG/1
500 CAPSULE ORAL 4 TIMES DAILY
Qty: 40 CAPSULE | Refills: 0 | Status: SHIPPED | OUTPATIENT
Start: 2024-02-21 | End: 2024-03-02

## 2024-02-21 RX ORDER — HYDROCODONE BITARTRATE AND ACETAMINOPHEN 10; 325 MG/1; MG/1
1 TABLET ORAL
Status: COMPLETED | OUTPATIENT
Start: 2024-02-21 | End: 2024-02-21

## 2024-02-21 RX ORDER — PHENAZOPYRIDINE HYDROCHLORIDE 200 MG/1
200 TABLET, FILM COATED ORAL 3 TIMES DAILY
Qty: 6 TABLET | Refills: 0 | Status: SHIPPED | OUTPATIENT
Start: 2024-02-21 | End: 2024-02-23

## 2024-02-21 RX ADMIN — CEFTRIAXONE SODIUM 1 G: 1 INJECTION, POWDER, FOR SOLUTION INTRAMUSCULAR; INTRAVENOUS at 06:02

## 2024-02-21 RX ADMIN — ONDANSETRON 4 MG: 2 INJECTION INTRAMUSCULAR; INTRAVENOUS at 03:02

## 2024-02-21 RX ADMIN — SODIUM CHLORIDE 1000 ML: 9 INJECTION, SOLUTION INTRAVENOUS at 03:02

## 2024-02-21 RX ADMIN — HYDROMORPHONE HYDROCHLORIDE 1 MG: 1 INJECTION, SOLUTION INTRAMUSCULAR; INTRAVENOUS; SUBCUTANEOUS at 03:02

## 2024-02-21 RX ADMIN — HYDROCODONE BITARTRATE AND ACETAMINOPHEN 1 TABLET: 10; 325 TABLET ORAL at 06:02

## 2024-02-21 NOTE — ED PROVIDER NOTES
Encounter Date: 2/21/2024       History     Chief Complaint   Patient presents with    Flank Pain     Right side    Hematuria     HPI  Review of patient's allergies indicates:  No Known Allergies  Past Medical History:   Diagnosis Date    Anxiety     COVID-19     Depression     Heart attack     Hyperlipidemia     Hypertension     Obesity     Obesity      Past Surgical History:   Procedure Laterality Date    CHOLECYSTECTOMY      HYSTERECTOMY       Family History   Problem Relation Age of Onset    No Known Problems Mother     No Known Problems Father      Social History     Tobacco Use    Smoking status: Never   Substance Use Topics    Alcohol use: No     Review of Systems    Physical Exam     Initial Vitals [02/21/24 0259]   BP Pulse Resp Temp SpO2   (!) 186/109 88 20 97.9 °F (36.6 °C) 96 %      MAP       --         Physical Exam    ED Course   Procedures  Labs Reviewed   CBC W/ AUTO DIFFERENTIAL - Abnormal; Notable for the following components:       Result Value    MCHC 31.8 (*)     All other components within normal limits   COMPREHENSIVE METABOLIC PANEL - Abnormal; Notable for the following components:    Glucose 118 (*)     Total Bilirubin 2.0 (*)     All other components within normal limits   URINALYSIS, REFLEX TO URINE CULTURE - Abnormal; Notable for the following components:    Protein, UA Trace (*)     Nitrite, UA Positive (*)     Leukocytes, UA 1+ (*)     All other components within normal limits    Narrative:     Specimen Source->Urine   URINALYSIS MICROSCOPIC - Abnormal; Notable for the following components:    WBC, UA 23 (*)     Bacteria Few (*)     Hyaline Casts, UA 9 (*)     All other components within normal limits    Narrative:     Specimen Source->Urine   CULTURE, URINE   URINALYSIS, REFLEX TO URINE CULTURE          Imaging Results              CT Renal Stone Study ABD Pelvis WO (Final result)  Result time 02/21/24 04:50:58      Final result by Boris Kat DO (02/21/24 04:50:58)                    Narrative:    EXAM:  CT Abdomen and Pelvis Without Intravenous Contrast    FACILITY:  [facility]    REFERRING:  AAAREFERRAL SELF    CLINICAL HISTORY:  43 years, Female; Nephrolithiasis, symptomatic/complicated    TECHNIQUE:  Axial computed tomography images of the abdomen and pelvis without intravenous contrast.  This CT exam was performed using one or more of the following dose reduction techniques:  automated exposure control, adjustment of the mA and/or kV according to patient size, and/or use of iterative reconstruction technique.    COMPARISON:  10/18/2021    FINDINGS:  Lung bases:  Unremarkable.  No mass.  No consolidation.    ABDOMEN:  Liver:  Small focal calcification is seen along the posterior capsule of the right lobe the liver.  Gallbladder and bile ducts:  Unremarkable.  No calcified stones.  No ductal dilation.  Pancreas:  There is a nodular soft tissue attenuation density adjacent to the tail the pancreas and anterior to the inferior splenic margin which could be associated with a pancreas tail lesion which has increased in size measuring 3.6 x 2.5 cm. On more remote exams this also exhibited a more cystic appearance and now is of intermediate/soft tissue attenuation. The nature is indeterminate. Recommend elective follow-up contrast enhanced pancreas protocol MRI evaluation.  Spleen:  Unremarkable.  No splenomegaly.  Adrenals:  Unremarkable.  No mass.  Kidneys and ureters:  Unremarkable.  No obstructing stones.  No hydronephrosis.  Stomach and bowel:  There are diverticula within the descending colon and sigmoid colon.    PELVIS:  Appendix:  No findings to suggest acute appendicitis.  Bladder:  Urinary bladder is decompressed.  No stones.  Reproductive:  The uterus is surgically absent.    ABDOMEN and PELVIS:  Intraperitoneal space:  Unremarkable.  No free air.  No significant fluid collection.  Bones/joints:  No acute fracture.  No dislocation.  Soft tissues:   Unremarkable.  Vasculature:  Unremarkable.  No abdominal aortic aneurysm.  Lymph nodes:  The gallbladder is surgically absent. Enlarged rhiannon hepatis lymph node measuring up to 20 x 14 mm there is stable in size.    IMPRESSION:    No acute findings in the abdomen or pelvis.    Indeterminate nodular soft tissue attenuation density adjacent to the tail the pancreas and anterior to the inferior splenic margin which could be associated with a pancreas tail lesion which has increased in size measuring 3.6 x 2.5 cm. Recommend elective follow-up contrast enhanced pancreas protocol MRI evaluation.    Stable enlarged rhiannon hepatis lymph node.    Cholecystectomy.    Diverticulosis.    Hysterectomy.    Electronically signed by:  Boris Kat DO  02/21/2024 04:50 AM CST Workstation: [id number]                                     Medications   cefTRIAXone (ROCEPHIN) 1 g in dextrose 5 % 100 mL IVPB (ready to mix) (has no administration in time range)   sodium chloride 0.9% bolus 1,000 mL 1,000 mL (0 mLs Intravenous Stopped 2/21/24 0448)   ondansetron injection 4 mg (4 mg Intravenous Given 2/21/24 0348)   HYDROmorphone injection 1 mg (1 mg Intravenous Given 2/21/24 0348)     Medical Decision Making  Patient with  UTI symptoms with dysuria frequency mild right back pain but the back pain is more musculoskeletal in nature.  It is definitely worse when she moves and turns.  No pain to palpation of her flank area.  Her urinalysis shows a UTI she will be discharged with Keflex after getting IV Rocephin.    Amount and/or Complexity of Data Reviewed  Labs: ordered.  Radiology: ordered.    Risk  Prescription drug management.                                      Clinical Impression:  Final diagnoses:  [R10.9] Right flank pain (Primary)  [N39.0] Urinary tract infection without hematuria, site unspecified          ED Disposition Condition    Discharge Stable          ED Prescriptions       Medication Sig Dispense Start Date End Date Auth.  Neurology Progress Note    Interval History:  Patient seen and examined at bedside this AM. Overnight patient had a possible aspiration event. TFs were held. CXR unchanged with no new infiltrates. Patient states he feels fine. States ongoing LLE weakness is unchanged. Denies any pain. Denies all other complaints.       Medications:  acetaminophen    Suspension .. 600 milliGRAM(s) Enteral Tube every 6 hours PRN  atorvastatin 40 milliGRAM(s) Oral at bedtime  dextrose 40% Gel 15 Gram(s) Oral once  dextrose 5%. 1000 milliLiter(s) IV Continuous <Continuous>  dextrose 5%. 1000 milliLiter(s) IV Continuous <Continuous>  dextrose 50% Injectable 25 Gram(s) IV Push once  dextrose 50% Injectable 12.5 Gram(s) IV Push once  dextrose 50% Injectable 25 Gram(s) IV Push once  glucagon  Injectable 1 milliGRAM(s) IntraMuscular once  heparin   Injectable 5000 Unit(s) SubCutaneous every 8 hours  insulin lispro (ADMELOG) corrective regimen sliding scale   SubCutaneous every 6 hours  levETIRAcetam  IVPB 500 milliGRAM(s) IV Intermittent every 24 hours  methylPREDNISolone sodium succinate Injectable 8 milliGRAM(s) IV Push every 24 hours  multivitamin 1 Tablet(s) Oral daily  polyethylene glycol 3350 17 Gram(s) Oral two times a day  pyridostigmine Injectable 1 milliGRAM(s) IV Push three times a day  senna 2 Tablet(s) Oral at bedtime  zinc sulfate 220 milliGRAM(s) Oral daily      Vital Signs Last 24 Hrs  T(C): 36.6 (10 Aug 2021 06:35), Max: 36.6 (10 Aug 2021 06:35)  T(F): 97.8 (10 Aug 2021 06:35), Max: 97.8 (10 Aug 2021 06:35)  HR: 60 (10 Aug 2021 06:35) (60 - 75)  BP: 166/70 (10 Aug 2021 06:35) (149/80 - 166/70)  BP(mean): --  RR: 18 (10 Aug 2021 06:35) (18 - 19)  SpO2: 99% (10 Aug 2021 06:35) (98% - 99%)      General: No acute distress  HEENT: NCAT, EOMI, PERRLA, anicteric sclera  Neck: Supple  Cardiovascular: +S1/S2, RRR  Respiratory: CTA B/L, L lower lobe diminished breath sounds, +rales, no wheezes or rhonchi  Gastrointestinal: Soft, NTND, +BS in all 4 quadrants, NGT placed  Extremities: No edema, clubbing or cyanosis noted  Vascular: 2+ radial, DP/PT pulses B/L  Neurologic: AAOx3. Motor strength testing 4/5 B/L UE. 4/5 in RLE. LLE 3/5, improving.  strength 5/5 B/L. Normal tone and bulk. Mild L sided facial droop noted, unchanged. No dysarthria.      Labs:  CBC Full  -  ( 10 Aug 2021 06:23 )  WBC Count : 39.18 K/uL  RBC Count : 2.99 M/uL  Hemoglobin : 8.9 g/dL  Hematocrit : 32.5 %  Platelet Count - Automated : 304 K/uL  Mean Cell Volume : 108.7 fl  Mean Cell Hemoglobin : 29.8 pg  Mean Cell Hemoglobin Concentration : 27.4 gm/dL  Auto Neutrophil # : 29.39 K/uL  Auto Lymphocyte # : 2.35 K/uL  Auto Monocyte # : 2.74 K/uL  Auto Eosinophil # : 1.96 K/uL  Auto Basophil # : 0.00 K/uL  Auto Neutrophil % : 75.0 %  Auto Lymphocyte % : 6.0 %  Auto Monocyte % : 7.0 %  Auto Eosinophil % : 5.0 %  Auto Basophil % : 0.0 %      08-10  140  |  111<H>  |  69<H>  ----------------------------<  91  SEE NOTE   |  22  |  1.50<H>    Ca    9.1      10 Aug 2021 06:23  Phos  5.0     08-10  Mg     2.2     08-10    TPro  6.8  /  Alb  2.9<L>  /  TBili  0.7  /  DBili  x   /  AST  30  /  ALT  32  /  AlkPhos  64  08-09    LIVER FUNCTIONS - ( 09 Aug 2021 08:43 )  Alb: 2.9 g/dL / Pro: 6.8 g/dL / ALK PHOS: 64 U/L / ALT: 32 U/L / AST: 30 U/L / GGT: x           PT/INR - ( 09 Aug 2021 08:43 )   PT: 13.2 sec;   INR: 1.11          PTT - ( 09 Aug 2021 08:43 )  PTT:31.2 sec     Provider    cephALEXin (KEFLEX) 500 MG capsule Take 1 capsule (500 mg total) by mouth 4 (four) times daily. for 10 days 40 capsule 2/21/2024 3/2/2024 Mervat Denise MD    phenazopyridine (PYRIDIUM) 200 MG tablet Take 1 tablet (200 mg total) by mouth 3 (three) times daily. for 2 days 6 tablet 2/21/2024 2/23/2024 Mervat Denise MD          Follow-up Information    None          Mervat Denise MD  02/21/24 [id number]

## 2024-02-21 NOTE — ED PROVIDER NOTES
Encounter Date: 2/21/2024       History     Chief Complaint   Patient presents with    Flank Pain     Right side    Hematuria     43-year-old female presented emergency department with right flank pain and hematuria which started last night.  Patient also complains of nausea and vomiting.  Denies fever chills or chest pain or shortness of breath or dysuria or any focal weakness or numbness      Review of patient's allergies indicates:  No Known Allergies  Past Medical History:   Diagnosis Date    Anxiety     COVID-19     Depression     Heart attack     Hyperlipidemia     Hypertension     Obesity     Obesity      Past Surgical History:   Procedure Laterality Date    CHOLECYSTECTOMY      HYSTERECTOMY       Family History   Problem Relation Age of Onset    No Known Problems Mother     No Known Problems Father      Social History     Tobacco Use    Smoking status: Never   Substance Use Topics    Alcohol use: No     Review of Systems   Constitutional: Negative.    HENT: Negative.     Eyes: Negative.    Respiratory: Negative.     Cardiovascular: Negative.  Negative for chest pain.   Gastrointestinal:  Positive for nausea and vomiting.   Endocrine: Negative.    Genitourinary:  Positive for flank pain and hematuria.   Skin: Negative.    Allergic/Immunologic: Negative.    Neurological: Negative.    Hematological: Negative.    Psychiatric/Behavioral: Negative.     All other systems reviewed and are negative.      Physical Exam     Initial Vitals [02/21/24 0259]   BP Pulse Resp Temp SpO2   (!) 186/109 88 20 97.9 °F (36.6 °C) 96 %      MAP       --         Physical Exam    Nursing note and vitals reviewed.  Constitutional: She appears well-developed and well-nourished.   HENT:   Head: Normocephalic and atraumatic.   Nose: Nose normal.   Mouth/Throat: Oropharynx is clear and moist.   Eyes: Conjunctivae and EOM are normal. Pupils are equal, round, and reactive to light.   Neck: Neck supple. No thyromegaly present. No tracheal  deviation present. No JVD present.   Normal range of motion.  Cardiovascular:  Normal rate, regular rhythm, normal heart sounds and intact distal pulses.           No murmur heard.  Pulmonary/Chest: Breath sounds normal. No stridor. No respiratory distress. She has no wheezes. She has no rales.   Abdominal: Abdomen is soft. Bowel sounds are normal. There is abdominal tenderness.   Right flank tenderness   Musculoskeletal:         General: No edema. Normal range of motion.      Cervical back: Normal range of motion and neck supple.     Neurological: She is alert and oriented to person, place, and time. GCS score is 15. GCS eye subscore is 4. GCS verbal subscore is 5. GCS motor subscore is 6.   Skin: Skin is warm. Capillary refill takes less than 2 seconds.   Psychiatric: She has a normal mood and affect. Thought content normal.         ED Course   Procedures  Labs Reviewed   CBC W/ AUTO DIFFERENTIAL   COMPREHENSIVE METABOLIC PANEL   URINALYSIS, REFLEX TO URINE CULTURE   URINALYSIS, REFLEX TO URINE CULTURE          Imaging Results    None          Medications   sodium chloride 0.9% bolus 1,000 mL 1,000 mL (has no administration in time range)   ondansetron injection 4 mg (has no administration in time range)   HYDROmorphone injection 1 mg (has no administration in time range)     Medical Decision Making  43-year-old female with right flank pain and tenderness and hematuria and presentation consistent with likely pain from kidney stones.  Will do screening labs and CT of the abdomen through evaluate for kidney stones and will control pain and treat nausea.  Will hydrate patient.  As it is shift change patient turned over to Dr. Denise for further management    Amount and/or Complexity of Data Reviewed  Radiology: ordered.    Risk  Prescription drug management.                                      Clinical Impression:  Final diagnoses:  [R10.9] Right flank pain (Primary)                 Sadia Prabhakar MD  02/21/24  [id number]

## 2024-02-23 LAB — BACTERIA UR CULT: ABNORMAL

## 2024-02-26 ENCOUNTER — NURSE TRIAGE (OUTPATIENT)
Dept: ADMINISTRATIVE | Facility: CLINIC | Age: 44
End: 2024-02-26
Payer: MEDICAID

## 2024-02-26 ENCOUNTER — TELEPHONE (OUTPATIENT)
Dept: ADMINISTRATIVE | Facility: CLINIC | Age: 44
End: 2024-02-26
Payer: MEDICAID

## 2024-02-26 ENCOUNTER — HOSPITAL ENCOUNTER (EMERGENCY)
Facility: HOSPITAL | Age: 44
Discharge: HOME | End: 2024-02-27
Attending: EMERGENCY MEDICINE
Payer: MEDICAID

## 2024-02-26 DIAGNOSIS — R10.9 ABDOMINAL PAIN, UNSPECIFIED ABDOMINAL LOCATION: Primary | ICD-10-CM

## 2024-02-26 DIAGNOSIS — K57.90 DIVERTICULOSIS: ICD-10-CM

## 2024-02-26 DIAGNOSIS — N39.0 URINARY TRACT INFECTION WITHOUT HEMATURIA, SITE UNSPECIFIED: ICD-10-CM

## 2024-02-26 DIAGNOSIS — Z12.31 ENCOUNTER FOR SCREENING MAMMOGRAM FOR MALIGNANT NEOPLASM OF BREAST: Primary | ICD-10-CM

## 2024-02-26 LAB
ALBUMIN SERPL BCP-MCNC: 4 G/DL (ref 3.5–5.2)
ALP SERPL-CCNC: 131 U/L (ref 55–135)
ALT SERPL W/O P-5'-P-CCNC: 18 U/L (ref 10–44)
ANION GAP SERPL CALC-SCNC: 5 MMOL/L (ref 8–16)
AST SERPL-CCNC: 11 U/L (ref 10–40)
BACTERIA #/AREA URNS HPF: ABNORMAL /HPF
BASOPHILS # BLD AUTO: 0.06 K/UL (ref 0–0.2)
BASOPHILS NFR BLD: 0.6 % (ref 0–1.9)
BILIRUB SERPL-MCNC: 1.5 MG/DL (ref 0.1–1)
BILIRUB UR QL STRIP: ABNORMAL
BUN SERPL-MCNC: 11 MG/DL (ref 6–20)
CALCIUM SERPL-MCNC: 9.4 MG/DL (ref 8.7–10.5)
CHLORIDE SERPL-SCNC: 106 MMOL/L (ref 95–110)
CLARITY UR: ABNORMAL
CO2 SERPL-SCNC: 30 MMOL/L (ref 23–29)
COLOR UR: ABNORMAL
CREAT SERPL-MCNC: 0.9 MG/DL (ref 0.5–1.4)
DIFFERENTIAL METHOD BLD: ABNORMAL
EOSINOPHIL # BLD AUTO: 0.3 K/UL (ref 0–0.5)
EOSINOPHIL NFR BLD: 2.6 % (ref 0–8)
EPITH CASTS #/AREA URNS LPF: 22 /LPF
ERYTHROCYTE [DISTWIDTH] IN BLOOD BY AUTOMATED COUNT: 13 % (ref 11.5–14.5)
EST. GFR  (NO RACE VARIABLE): >60 ML/MIN/1.73 M^2
GLUCOSE SERPL-MCNC: 105 MG/DL (ref 70–110)
GLUCOSE UR QL STRIP: NEGATIVE
HCT VFR BLD AUTO: 46.3 % (ref 37–48.5)
HGB BLD-MCNC: 14.4 G/DL (ref 12–16)
HGB UR QL STRIP: ABNORMAL
HYALINE CASTS #/AREA URNS LPF: 0 /LPF
IMM GRANULOCYTES # BLD AUTO: 0.03 K/UL (ref 0–0.04)
IMM GRANULOCYTES NFR BLD AUTO: 0.3 % (ref 0–0.5)
KETONES UR QL STRIP: NEGATIVE
LEUKOCYTE ESTERASE UR QL STRIP: ABNORMAL
LIPASE SERPL-CCNC: 13 U/L (ref 4–60)
LYMPHOCYTES # BLD AUTO: 2.5 K/UL (ref 1–4.8)
LYMPHOCYTES NFR BLD: 23.7 % (ref 18–48)
MCH RBC QN AUTO: 29.2 PG (ref 27–31)
MCHC RBC AUTO-ENTMCNC: 31.1 G/DL (ref 32–36)
MCV RBC AUTO: 94 FL (ref 82–98)
MICROSCOPIC COMMENT: ABNORMAL
MONOCYTES # BLD AUTO: 0.5 K/UL (ref 0.3–1)
MONOCYTES NFR BLD: 4.2 % (ref 4–15)
NEUTROPHILS # BLD AUTO: 7.4 K/UL (ref 1.8–7.7)
NEUTROPHILS NFR BLD: 68.6 % (ref 38–73)
NITRITE UR QL STRIP: POSITIVE
NRBC BLD-RTO: 0 /100 WBC
PH UR STRIP: 7 [PH] (ref 5–8)
PLATELET # BLD AUTO: 351 K/UL (ref 150–450)
PMV BLD AUTO: 10.1 FL (ref 9.2–12.9)
POTASSIUM SERPL-SCNC: 3.9 MMOL/L (ref 3.5–5.1)
PROT SERPL-MCNC: 7.8 G/DL (ref 6–8.4)
PROT UR QL STRIP: ABNORMAL
RBC # BLD AUTO: 4.93 M/UL (ref 4–5.4)
RBC #/AREA URNS HPF: 83 /HPF (ref 0–4)
SODIUM SERPL-SCNC: 141 MMOL/L (ref 136–145)
SP GR UR STRIP: >1.03 (ref 1–1.03)
URN SPEC COLLECT METH UR: ABNORMAL
UROBILINOGEN UR STRIP-ACNC: >=8 EU/DL
WBC # BLD AUTO: 10.73 K/UL (ref 3.9–12.7)
WBC #/AREA URNS HPF: >100 /HPF (ref 0–5)

## 2024-02-26 PROCEDURE — 99285 EMERGENCY DEPT VISIT HI MDM: CPT | Mod: 25

## 2024-02-26 PROCEDURE — 63600175 PHARM REV CODE 636 W HCPCS: Performed by: STUDENT IN AN ORGANIZED HEALTH CARE EDUCATION/TRAINING PROGRAM

## 2024-02-26 PROCEDURE — 81001 URINALYSIS AUTO W/SCOPE: CPT | Performed by: EMERGENCY MEDICINE

## 2024-02-26 PROCEDURE — 83690 ASSAY OF LIPASE: CPT | Performed by: EMERGENCY MEDICINE

## 2024-02-26 PROCEDURE — 80053 COMPREHEN METABOLIC PANEL: CPT | Performed by: EMERGENCY MEDICINE

## 2024-02-26 PROCEDURE — 87086 URINE CULTURE/COLONY COUNT: CPT | Performed by: EMERGENCY MEDICINE

## 2024-02-26 PROCEDURE — 85025 COMPLETE CBC W/AUTO DIFF WBC: CPT | Performed by: EMERGENCY MEDICINE

## 2024-02-26 PROCEDURE — 25500020 PHARM REV CODE 255: Performed by: STUDENT IN AN ORGANIZED HEALTH CARE EDUCATION/TRAINING PROGRAM

## 2024-02-26 PROCEDURE — 96374 THER/PROPH/DIAG INJ IV PUSH: CPT

## 2024-02-26 PROCEDURE — 87147 CULTURE TYPE IMMUNOLOGIC: CPT | Performed by: EMERGENCY MEDICINE

## 2024-02-26 RX ORDER — MORPHINE SULFATE 4 MG/ML
4 INJECTION, SOLUTION INTRAMUSCULAR; INTRAVENOUS
Status: COMPLETED | OUTPATIENT
Start: 2024-02-26 | End: 2024-02-26

## 2024-02-26 RX ADMIN — IOHEXOL 100 ML: 350 INJECTION, SOLUTION INTRAVENOUS at 11:02

## 2024-02-26 RX ADMIN — MORPHINE SULFATE 4 MG: 4 INJECTION, SOLUTION INTRAMUSCULAR; INTRAVENOUS at 10:02

## 2024-02-26 NOTE — FIRST PROVIDER EVALUATION
Medical screening examination initiated.  I have conducted a focused provider triage encounter, findings are as follows:    Brief history of present illness:  abdominal pain     Vitals:    02/26/24 1635   BP: (!) 178/112   BP Location: Left arm   Patient Position: Sitting   Pulse: 70   Resp: 18   Temp: 97.7 °F (36.5 °C)   TempSrc: Oral   SpO2: 95%       Pertinent physical exam:  Patient here with complaint of abdominal pain and bloody stools seen here on the 20th for same complaint  Diagnosed with a UTI  Brief workup plan:  Labs    Preliminary workup initiated; this workup will be continued and followed by the physician or advanced practice provider that is assigned to the patient when roomed.

## 2024-02-26 NOTE — TELEPHONE ENCOUNTER
Pt is reporting severe abd pain since she last went to the ED on 2/20/24. Pt was diagnosed with a UTI but also told she needs an MRI of her pancreas. Pt began with diarrhea yesterday- 6 episodes in the last 24 hours. Pain is below her right breast but in upper abd and radiates to her back. Currently rates pain 10/10 with movement.    Dispo- go to ED now. Pt advised and VU. Call back reasons after evaluation discussed.  Reason for Disposition   SEVERE abdominal pain (e.g., excruciating)    Additional Information   Negative: SEVERE difficulty breathing (e.g., struggling for each breath, speaks in single words)   Negative: Shock suspected (e.g., cold/pale/clammy skin, too weak to stand, low BP, rapid pulse)   Negative: Difficult to awaken or acting confused (e.g., disoriented, slurred speech)   Negative: Passed out (i.e., lost consciousness, collapsed and was not responding)   Negative: Visible sweat on face or sweat is dripping down   Negative: Sounds like a life-threatening emergency to the triager    Protocols used: Abdominal Pain - Upper-A-OH

## 2024-02-26 NOTE — PROGRESS NOTES
Symptom: Abdominal Pain - Female - Not Pregnant  Outcome: Transfer to a nurse or provider NOW!  Reason: Severe pain now    The caller accepted this outcome. Transferred to nurse.

## 2024-02-27 VITALS
DIASTOLIC BLOOD PRESSURE: 82 MMHG | SYSTOLIC BLOOD PRESSURE: 128 MMHG | BODY MASS INDEX: 61.24 KG/M2 | HEART RATE: 69 BPM | TEMPERATURE: 98 F | OXYGEN SATURATION: 96 % | WEIGHT: 293 LBS | RESPIRATION RATE: 21 BRPM

## 2024-02-27 PROCEDURE — 25000003 PHARM REV CODE 250: Performed by: STUDENT IN AN ORGANIZED HEALTH CARE EDUCATION/TRAINING PROGRAM

## 2024-02-27 RX ORDER — LEVOFLOXACIN 750 MG/1
750 TABLET ORAL ONCE
Status: COMPLETED | OUTPATIENT
Start: 2024-02-27 | End: 2024-02-27

## 2024-02-27 RX ORDER — FLUCONAZOLE 150 MG/1
150 TABLET ORAL DAILY
Qty: 1 TABLET | Refills: 0 | Status: SHIPPED | OUTPATIENT
Start: 2024-02-27 | End: 2024-02-28

## 2024-02-27 RX ORDER — HYOSCYAMINE SULFATE 0.125 MG
125 TABLET ORAL EVERY 6 HOURS PRN
Qty: 20 TABLET | Refills: 0 | Status: SHIPPED | OUTPATIENT
Start: 2024-02-27 | End: 2024-03-03

## 2024-02-27 RX ORDER — LEVOFLOXACIN 750 MG/1
750 TABLET ORAL DAILY
Qty: 4 TABLET | Refills: 0 | Status: SHIPPED | OUTPATIENT
Start: 2024-02-28 | End: 2024-03-03

## 2024-02-27 RX ADMIN — LEVOFLOXACIN 750 MG: 750 TABLET, FILM COATED ORAL at 12:02

## 2024-02-27 NOTE — DISCHARGE INSTRUCTIONS
Please follow-up with a nodule near your pancreas with your primary care doctor.    We are adding on another antibiotic as your urine so has lots bacteria in it.  Please take the Levaquin (levofloxacin) as prescribed starting on 02/28.    Follow up your abdominal pain with your primary care doctor.

## 2024-02-27 NOTE — ED PROVIDER NOTES
Encounter Date: 2/26/2024       History     Chief Complaint   Patient presents with    Abdominal Pain      Right side abd pain and in back, having diarrhea and bloody stool .     HPI    43-year-old who was recently diagnosed with the UTI presents to the emergency department for abdominal pain.      Patient states that she has been on antibiotics for about 4 days but is continuing to have abdominal pain.  States that when she originally came she was having right-sided flank pain but now she is having some right-sided abdominal pain.  Has had nausea but denies vomiting.  Patient states that she has also had multiple episodes of loose stool with some blood in it today.      Denies taking any other antibiotics other than Keflex.      Denies having fevers, chills, nausea, vomiting.      Review of patient's allergies indicates:  No Known Allergies  Past Medical History:   Diagnosis Date    Anxiety     COVID-19     Depression     Heart attack     Hyperlipidemia     Hypertension     Obesity     Obesity      Past Surgical History:   Procedure Laterality Date    CHOLECYSTECTOMY      HYSTERECTOMY       Family History   Problem Relation Age of Onset    No Known Problems Mother     No Known Problems Father      Social History     Tobacco Use    Smoking status: Never   Substance Use Topics    Alcohol use: No     Review of Systems   Constitutional:  Negative for fever.   Gastrointestinal:  Positive for abdominal pain, diarrhea and nausea. Negative for vomiting.   Genitourinary:  Negative for dysuria.   Musculoskeletal:  Negative for arthralgias.       Physical Exam     Initial Vitals [02/26/24 1635]   BP Pulse Resp Temp SpO2   (!) 178/112 70 18 97.7 °F (36.5 °C) 95 %      MAP       --         Physical Exam    Constitutional: She appears well-developed and well-nourished.   HENT:   Head: Normocephalic and atraumatic.   Neck:   Normal range of motion.  Cardiovascular:  Normal rate and regular rhythm.           Pulmonary/Chest: She  has no rhonchi. She has no rales.   Abdominal: Abdomen is soft. She exhibits no distension. There is abdominal tenderness.   Right-sided tenderness palpation of abdomen and right-sided CVA tenderness There is no guarding.   Genitourinary:    Vagina normal.     Musculoskeletal:         General: Normal range of motion.      Cervical back: Normal range of motion.     Neurological: She is alert and oriented to person, place, and time.   Skin: Skin is warm and dry. Capillary refill takes less than 2 seconds.   Psychiatric: She has a normal mood and affect.       ED Course   Procedures  Labs Reviewed   CBC W/ AUTO DIFFERENTIAL - Abnormal; Notable for the following components:       Result Value    MCHC 31.1 (*)     All other components within normal limits   COMPREHENSIVE METABOLIC PANEL - Abnormal; Notable for the following components:    CO2 30 (*)     Total Bilirubin 1.5 (*)     Anion Gap 5 (*)     All other components within normal limits   URINALYSIS, REFLEX TO URINE CULTURE - Abnormal; Notable for the following components:    Color, UA Brown (*)     Appearance, UA Hazy (*)     Specific Gravity, UA >1.030 (*)     Protein, UA 1+ (*)     Bilirubin (UA) 2+ (*)     Occult Blood UA 2+ (*)     Nitrite, UA Positive (*)     Urobilinogen, UA >=8.0 (*)     Leukocytes, UA 3+ (*)     All other components within normal limits    Narrative:     Specimen Source->Urine   URINALYSIS MICROSCOPIC - Abnormal; Notable for the following components:    RBC, UA 83 (*)     WBC, UA >100 (*)     Epithelial Casts, UA 22 (*)     All other components within normal limits    Narrative:     Specimen Source->Urine   CULTURE, URINE   LIPASE          Imaging Results              CT Abdomen Pelvis With IV Contrast NO Oral Contrast (Final result)  Result time 02/26/24 23:49:38      Final result by Kymberly Workman MD (02/26/24 23:49:38)                   Narrative:    EXAM:  CT Abdomen and Pelvis With Intravenous Contrast    CLINICAL HISTORY:  The  patient is 43 years old and is Female; Abdominal abscess/infection suspected    TECHNIQUE:  Axial computed tomography images of the abdomen and pelvis with intravenous contrast.  Sagittal and coronal reformatted images were created and reviewed.  This CT exam was performed using one or more of the following dose reduction techniques:  automated exposure control, adjustment of the mA and/or kV according to patient size, and/or use of iterative reconstruction technique.    COMPARISON:  February 21, 2024. October 18, 2021. December 27, 2016.    FINDINGS:  Lung bases:  No acute infiltrate.    ABDOMEN:  Liver:  No hepatomegaly.  Gallbladder and bile ducts:  Prior cholecystectomy.  No ductal dilation.  Pancreas:     Homogeneous enhancement. No inflammation or ductal dilation.    Nodular soft tissue adjacent to the pancreatic tail and inferior splenic margin. The largest, abutting the spleen, measuring 3.2 cm x 3.2 cm. Oval soft tissue between the stomach and the spleen, 1.6 cm x 2.2 cm. Nodular soft tissue stable compared with the recent study from 5 days ago, and mildly increased in size compared with the exam from 2016.  Spleen:  No splenomegaly.  Adrenals:  No mass.  Kidneys and ureters:  Normal kidneys.  No hydronephrosis.  Stomach and bowel:  Stomach grossly normal. Bowel not dilated or thickened. Moderate diverticulosis of the descending and sigmoid colon. No acute diverticulitis.    PELVIS:  Appendix:  No findings to suggest acute appendicitis.  Bladder:  Bladder mildly filled, not thickened.  Reproductive:  Prior hysterectomy.    ABDOMEN and PELVIS:  Intraperitoneal space:  No free air.  No significant fluid collection.  Bones/joints:  No acute fracture. No aggressive osseous lesion.  Soft tissues:  [place] abundant fat of the body wall, compatible with obesity.  Vasculature:  Normal aorta.      IMPRESSION:  1.  No acute intra-abdominal or pelvic abnormality.  2.  Moderate diverticulosis of the descending and  sigmoid colon. No acute diverticulitis.  3.  Nodular soft tissue adjacent to the pancreatic tail and spleen. Stable compared with the recent study from 5 days ago, and mildly increased in size compared with the exam from 2016. Indeterminate.    Electronically signed by:  Kymberly Segundo MD  02/26/2024 11:49 PM CST Workstation: [id number]                                     Medications   morphine injection 4 mg (4 mg Intravenous Given 2/26/24 8170)   iohexoL (OMNIPAQUE 350) injection 100 mL (100 mLs Intravenous Given 2/26/24 2317)   levoFLOXacin tablet 750 mg (750 mg Oral Given 2/27/24 0055)     Medical Decision Making  43-year-old presents to emergency department with abdominal pain.  Recently diagnosed with a UTI has been on Keflex q.i.d..    Vital signs within acceptable limits, physical examination notable for right-sided abdominal tenderness to palpation and right-sided flank pain.    Differential diagnosis includes pyelonephritis versus intra-abdominal abscess, colitis, cystitis.      Patient's UA is unimproved after 4 days of antibiotics.  Due to her increased tenderness palpation, we will give pain medicines and obtain a CT abdomen pelvis with contrast today.  Pending final dispo.    Whitney Butler MD, LANE  [facility] Emergency Medicine PGY-4  02/26/2024 9:45 PM      Amount and/or Complexity of Data Reviewed  Labs: ordered. Decision-making details documented in ED Course.  Radiology: ordered.    Risk  Prescription drug management.               ED Course as of 02/27/24 0058   Mon Feb 26, 2024 2112 Leukocyte Esterase, UA(!): 3+ [MB]   2112 NITRITE UA(!): Positive [MB]   2129 BILIRUBIN TOTAL(!): 1.5  Lower than previous [MB]   2129 AST: 11  Within normal limits [MB]   2129 ALT: 18  Within normal limits [MB]   2148 Patient has had a hysterectomy, does not require UPT at this time. [MB]   Tue Feb 27, 2024   0040 Patient given pain medicine for abdominal pain.  CT abdomen pelvis showed diverticulosis, which would  explain her bowel sxs.  Nodule near pancreas was seen again.  Concerned that patient has UTI may not be treated fully given worsening UA, we will add on levofloxacin to her medication regimen. [MB]      ED Course User Index  [MB] Whitney Butler MD                           Clinical Impression:  Final diagnoses:  [R10.9] Abdominal pain, unspecified abdominal location (Primary)  [N39.0] Urinary tract infection without hematuria, site unspecified  [K57.90] Diverticulosis          ED Disposition Condition    Discharge Stable          ED Prescriptions       Medication Sig Dispense Start Date End Date Auth. Provider    levoFLOXacin (LEVAQUIN) 750 MG tablet Take 1 tablet (750 mg total) by mouth once daily. for 4 days 4 tablet 2/28/2024 3/3/2024 Whitney Butler MD    fluconazole (DIFLUCAN) 150 MG Tab Take 1 tablet (150 mg total) by mouth once daily. for 1 day 1 tablet 2/27/2024 2/28/2024 Whitney Butler MD    hyoscyamine (ANASPAZ,LEVSIN) 0.125 mg Tab Take 1 tablet (125 mcg total) by mouth every 6 (six) hours as needed (cramping, abdominal pain). 20 tablet 2/27/2024 3/3/2024 Whitney Butler MD          Follow-up Information       Follow up With Specialties Details Why Contact Info Additional Information    [facility] - Emergency Dept Emergency Medicine  As needed [address] [address]  [phone] 1st floor             Whitney Butler MD  Resident  02/27/24 0058

## 2024-02-28 LAB — BACTERIA UR CULT: ABNORMAL

## 2024-03-04 DIAGNOSIS — K86.2 PANCREAS CYST: Primary | ICD-10-CM

## 2024-03-13 DIAGNOSIS — K86.2 CYST OF PANCREAS: Primary | ICD-10-CM

## 2024-03-17 ENCOUNTER — HOSPITAL ENCOUNTER (OUTPATIENT)
Dept: RADIOLOGY | Facility: HOSPITAL | Age: 44
Discharge: HOME | End: 2024-03-17
Payer: MEDICAID

## 2024-03-17 DIAGNOSIS — K86.2 CYST OF PANCREAS: ICD-10-CM

## 2024-03-17 PROCEDURE — 74183 MRI ABD W/O CNTR FLWD CNTR: CPT | Mod: TC

## 2024-03-17 PROCEDURE — 25500020 PHARM REV CODE 255: Performed by: NURSE PRACTITIONER

## 2024-03-17 PROCEDURE — A9585 GADOBUTROL INJECTION: HCPCS | Performed by: NURSE PRACTITIONER

## 2024-03-17 RX ORDER — GADOBUTROL 604.72 MG/ML
15 INJECTION INTRAVENOUS
Status: COMPLETED | OUTPATIENT
Start: 2024-03-17 | End: 2024-03-17

## 2024-03-17 RX ADMIN — GADOBUTROL 15 ML: 604.72 INJECTION INTRAVENOUS at 03:03

## 2024-12-03 ENCOUNTER — TELEPHONE (OUTPATIENT)
Dept: GASTROENTEROLOGY | Facility: CLINIC | Age: 44
End: 2024-12-03
Payer: MEDICAID

## 2024-12-03 NOTE — TELEPHONE ENCOUNTER
No referral received and unable to schedule patient insurance at this time. Can try the medicaid access team

## 2024-12-03 NOTE — TELEPHONE ENCOUNTER
----- Message from Sharon sent at 12/3/2024  2:36 PM CST -----  Contact: pt [phone]  Type: Needs Medical Advice  Who Called:  Pt     Best Call Back Number: [phone]    Additional Information: Pt calling to check if office received her referral from Dr Ellsworth. Pls call back and advise

## 2025-02-26 DIAGNOSIS — R79.89 ABNORMAL BILIRUBIN TEST: ICD-10-CM

## 2025-02-26 DIAGNOSIS — Z98.84 BARIATRIC SURGERY STATUS: Primary | ICD-10-CM

## 2025-02-26 DIAGNOSIS — R10.13 EPIGASTRIC PAIN: ICD-10-CM

## 2025-03-05 ENCOUNTER — HOSPITAL ENCOUNTER (OUTPATIENT)
Dept: RADIOLOGY | Facility: HOSPITAL | Age: 45
Discharge: HOME | End: 2025-03-05
Attending: NURSE PRACTITIONER
Payer: MEDICAID

## 2025-03-05 DIAGNOSIS — R79.89 ABNORMAL BILIRUBIN TEST: ICD-10-CM

## 2025-03-05 DIAGNOSIS — Z98.84 BARIATRIC SURGERY STATUS: ICD-10-CM

## 2025-03-05 DIAGNOSIS — Z98.84 BARIATRIC SURGERY STATUS: Primary | ICD-10-CM

## 2025-03-05 DIAGNOSIS — R10.13 ABDOMINAL PAIN, EPIGASTRIC: ICD-10-CM

## 2025-03-05 DIAGNOSIS — R10.13 EPIGASTRIC PAIN: ICD-10-CM

## 2025-03-05 DIAGNOSIS — R79.89 HYPOURICEMIA: ICD-10-CM

## 2025-03-05 PROCEDURE — 76700 US EXAM ABDOM COMPLETE: CPT | Mod: 26,,, | Performed by: RADIOLOGY

## 2025-03-05 PROCEDURE — 76700 US EXAM ABDOM COMPLETE: CPT | Mod: TC,PO

## 2025-03-20 DIAGNOSIS — R93.2 ABNORMAL ULTRASOUND OF LIVER: ICD-10-CM

## 2025-03-20 DIAGNOSIS — Z98.84 BARIATRIC SURGERY STATUS: ICD-10-CM

## 2025-03-20 DIAGNOSIS — R79.89 ABNORMAL BILIRUBIN TEST: ICD-10-CM

## 2025-03-20 DIAGNOSIS — R10.13 EPIGASTRIC PAIN: Primary | ICD-10-CM

## 2025-03-26 ENCOUNTER — HOSPITAL ENCOUNTER (OUTPATIENT)
Dept: RADIOLOGY | Facility: HOSPITAL | Age: 45
Discharge: HOME | End: 2025-03-26
Attending: SPECIALIST
Payer: MEDICAID

## 2025-03-26 DIAGNOSIS — Z98.84 BARIATRIC SURGERY STATUS: ICD-10-CM

## 2025-03-26 DIAGNOSIS — R10.13 EPIGASTRIC PAIN: ICD-10-CM

## 2025-03-26 DIAGNOSIS — R79.89 ABNORMAL BILIRUBIN TEST: ICD-10-CM

## 2025-03-26 DIAGNOSIS — R93.2 ABNORMAL ULTRASOUND OF LIVER: ICD-10-CM

## 2025-03-26 LAB
CREAT SERPL-MCNC: 1 MG/DL (ref 0.5–1.4)
SAMPLE: NORMAL

## 2025-03-26 PROCEDURE — 25500020 PHARM REV CODE 255: Performed by: SPECIALIST

## 2025-03-26 PROCEDURE — 74177 CT ABD & PELVIS W/CONTRAST: CPT | Mod: TC

## 2025-03-26 PROCEDURE — 74177 CT ABD & PELVIS W/CONTRAST: CPT | Mod: 26,,, | Performed by: RADIOLOGY

## 2025-03-26 RX ADMIN — IOHEXOL 100 ML: 350 INJECTION, SOLUTION INTRAVENOUS at 10:03

## 2025-04-15 DIAGNOSIS — R93.2 ABNORMAL LIVER ULTRASOUND: ICD-10-CM

## 2025-04-15 DIAGNOSIS — Z98.84 BARIATRIC SURGERY STATUS: ICD-10-CM

## 2025-04-15 DIAGNOSIS — R10.13 EPIGASTRIC PAIN: Primary | ICD-10-CM

## 2025-04-16 ENCOUNTER — TELEPHONE (OUTPATIENT)
Dept: GASTROENTEROLOGY | Facility: CLINIC | Age: 45
End: 2025-04-16
Payer: MEDICAID

## 2025-04-16 NOTE — TELEPHONE ENCOUNTER
----- Message from Celine sent at 4/16/2025  9:41 AM CDT -----  Type:  Sooner Apoointment RequestCaller is requesting a sooner appointment.  Caller declined first available appointment listed below.  Caller will not accept being placed on the waitlist and is requesting a message be sent to doctor.Name of Caller:pt When is the first available appointment?n/aSymptoms:) - Epigastric painR93.2 (ICD-10-CM) - Abnormal liver [id number] (ICD-10-CM) - Bariatric surgery status Would the patient rather a call back or a response via [facility]? Call Best Call Back Number:[phone] Information: pt was directed to dr rodriguez because he does see her condition from the referral and accepts her medicaid from [employer].

## 2025-04-22 ENCOUNTER — TELEPHONE (OUTPATIENT)
Dept: GASTROENTEROLOGY | Facility: CLINIC | Age: 45
End: 2025-04-22
Payer: MEDICAID

## 2025-04-22 NOTE — TELEPHONE ENCOUNTER
----- Message from Laura sent at 4/22/2025  2:53 PM CDT -----  Regarding: Missed Call  Contact: Maribel  Returning a Missed CallCaller: Maribel Returning call to: .Sabine can be reached @:  [phone] of the call:  Pt missed a call from Maru. Would like a call back to further discuss

## 2025-04-28 ENCOUNTER — HOSPITAL ENCOUNTER (EMERGENCY)
Facility: HOSPITAL | Age: 45
Discharge: HOME | End: 2025-04-28
Attending: EMERGENCY MEDICINE
Payer: MEDICAID

## 2025-04-28 VITALS
RESPIRATION RATE: 20 BRPM | WEIGHT: 293 LBS | BODY MASS INDEX: 45.99 KG/M2 | HEIGHT: 67 IN | OXYGEN SATURATION: 99 % | DIASTOLIC BLOOD PRESSURE: 94 MMHG | SYSTOLIC BLOOD PRESSURE: 177 MMHG | HEART RATE: 71 BPM | TEMPERATURE: 98 F

## 2025-04-28 DIAGNOSIS — Z76.89 ENCOUNTER FOR INCISION AND DRAINAGE PROCEDURE: ICD-10-CM

## 2025-04-28 DIAGNOSIS — L02.91 ABSCESS: Primary | ICD-10-CM

## 2025-04-28 PROCEDURE — 63600175 PHARM REV CODE 636 W HCPCS

## 2025-04-28 PROCEDURE — 99284 EMERGENCY DEPT VISIT MOD MDM: CPT | Mod: 25

## 2025-04-28 PROCEDURE — 10060 I&D ABSCESS SIMPLE/SINGLE: CPT

## 2025-04-28 PROCEDURE — 96372 THER/PROPH/DIAG INJ SC/IM: CPT

## 2025-04-28 RX ORDER — MUPIROCIN 20 MG/G
OINTMENT TOPICAL 3 TIMES DAILY
Qty: 15 G | Refills: 0 | Status: SHIPPED | OUTPATIENT
Start: 2025-04-28

## 2025-04-28 RX ORDER — LIDOCAINE HYDROCHLORIDE 10 MG/ML
10 INJECTION, SOLUTION INFILTRATION; PERINEURAL
Status: COMPLETED | OUTPATIENT
Start: 2025-04-28 | End: 2025-04-28

## 2025-04-28 RX ORDER — MORPHINE SULFATE 2 MG/ML
6 INJECTION, SOLUTION INTRAMUSCULAR; INTRAVENOUS
Status: COMPLETED | OUTPATIENT
Start: 2025-04-28 | End: 2025-04-28

## 2025-04-28 RX ORDER — SULFAMETHOXAZOLE AND TRIMETHOPRIM 800; 160 MG/1; MG/1
1 TABLET ORAL 2 TIMES DAILY
Qty: 14 TABLET | Refills: 0 | Status: SHIPPED | OUTPATIENT
Start: 2025-04-28 | End: 2025-05-05

## 2025-04-28 RX ORDER — FLUCONAZOLE 200 MG/1
200 TABLET ORAL DAILY PRN
Qty: 2 TABLET | Refills: 0 | Status: SHIPPED | OUTPATIENT
Start: 2025-04-28

## 2025-04-28 RX ADMIN — MORPHINE SULFATE 6 MG: 2 INJECTION, SOLUTION INTRAMUSCULAR; INTRAVENOUS at 11:04

## 2025-04-28 RX ADMIN — LIDOCAINE HYDROCHLORIDE 10 ML: 10 INJECTION, SOLUTION INFILTRATION; PERINEURAL at 12:04

## 2025-04-28 NOTE — ED PROVIDER NOTES
Encounter Date: 4/28/2025       History     Chief Complaint   Patient presents with    Abscess     Under left arm - noticed on Friday      44-year-old female with a past medical history of hypertension, hyperlipidemia, anxiety, depression, sleep apnea, obesity, and gastric bypass presents to the ED for abscess.  Patient states she noticed it Friday on her left ribs under a skin fold.  Patient states it has gotten worse.  Patient complaining of 10/10 pain.  Tylenol p.m. with no relief.  Touch makes it worse.  Denies any trauma or wounds.  Also admits to swelling and erythema.  Denies fever, sweats, chills, purulent discharge, nausea, vomiting.      Review of patient's allergies indicates:   Allergen Reactions    Ibuprofen Other (See Comments)     Past Medical History:   Diagnosis Date    Anxiety     COVID-19     Depression     Heart attack     Hyperlipidemia     Hypertension     Obesity     Obesity      Past Surgical History:   Procedure Laterality Date    CHOLECYSTECTOMY      HYSTERECTOMY       Family History   Problem Relation Name Age of Onset    No Known Problems Mother      No Known Problems Father       Social History[1]  Review of Systems   Constitutional:  Negative for chills, diaphoresis and fever.   Gastrointestinal:  Negative for nausea and vomiting.   Skin:  Positive for wound (abscess).        (-) pus   Neurological:  Negative for weakness.       Physical Exam     Initial Vitals [04/28/25 1021]   BP Pulse Resp Temp SpO2   (!) 174/84 71 18 97.9 °F (36.6 °C) 98 %      MAP       --         Physical Exam    Nursing note and vitals reviewed.  Constitutional: She appears well-developed and well-nourished. She is not diaphoretic. She is active. She does not appear ill. No distress.   HENT:   Head: Normocephalic and atraumatic.   Right Ear: External ear normal.   Left Ear: External ear normal.   Nose: Nose normal.   Eyes: Conjunctivae and EOM are normal. Pupils are equal, round, and reactive to light. Right eye  exhibits no discharge. Left eye exhibits no discharge.   Neck: Neck supple.   Normal range of motion.  Pulmonary/Chest: Effort normal. No respiratory distress.   Abdominal: She exhibits no distension.   Musculoskeletal:         General: Normal range of motion.      Cervical back: Normal range of motion and neck supple.     Neurological: She is alert and oriented to person, place, and time. GCS eye subscore is 4. GCS verbal subscore is 5. GCS motor subscore is 6.   Skin: Skin is dry. Capillary refill takes less than 2 seconds. Abscess (2 cm left lateral ribs abscess under skin fold with erythema, fluctuance, and ttp; no active discharge) noted.         ED Course   I & D - Incision and Drainage    Date/Time: 4/28/2025 10:17 AM  Location procedure was performed: [facility] EMERGENCY DEPARTMENT    Performed by: Holdsworth, Alayna, PA-C  Authorized by: Holdsworth, Alayna, PA-C  Type: abscess  Body area: trunk (left lateral chest wall)  Anesthesia: local infiltration    Anesthesia:  Local Anesthetic: lidocaine 1% without epinephrine  Scalpel size: 11  Incision type: single straight  Incision depth: subcutaneous  Complexity: simple  Drainage: purulent and pus  Drainage amount: moderate  Wound treatment: incision, wound left open, expression of material, drainage and deloculation  Complications: No  Specimens: No  Implants: No  Patient tolerance: Patient tolerated the procedure well with no immediate complications    Incision depth: subcutaneous        Labs Reviewed   HEPATITIS C ANTIBODY   HIV 1 / 2 ANTIBODY          Imaging Results    None          Medications   LIDOcaine HCL 10 mg/ml (1%) injection 10 mL (has no administration in time range)   morphine injection 6 mg (6 mg Intramuscular Given 4/28/25 1157)     Medical Decision Making  44-year-old female with a past medical history of hypertension, hyperlipidemia, anxiety, depression, sleep apnea, obesity, and gastric bypass presents to the ED for abscess.   Patient's chart and  medical history reviewed.    Ddx:  Abscess  Cellulitis  Cyst  HS    Patient's vitals reviewed.  Afebrile, no respiratory distress, and nontoxic-appearing in the ED. Patient had a 2 cm left lateral ribs abscess under skin fold with erythema, fluctuance, and ttp; no active discharge.  Patient given morphine for pain.  I&D performed with moderate purulent discharge.  Patient tolerated well.  Discussed this case with Dr. Monroy.  Discussed with patient to do warm compresses to promote further drainage.  Instructed patient to keep the area clean and dry and watch out for signs of worsening infection including worsening erythema, warmth, pus discharge, and fevers at home. Patient sent home with bactroban and bactrim.  Patient requesting Diflucan due to yeast infections with antibiotics.  She will follow-up with the PCP. Patient agrees with this plan. Discussed with here strict return precautions, she verbalized understanding. Patient is stable for discharge.     Amount and/or Complexity of Data Reviewed  External Data Reviewed: labs, radiology and notes.    Risk  Prescription drug management.                                      Clinical Impression:  Final diagnoses:  [L02.91] Abscess (Primary)  [Z76.89] Encounter for incision and drainage procedure          ED Disposition Condition    Discharge Stable          ED Prescriptions       Medication Sig Dispense Start Date End Date Auth. Provider    mupirocin (BACTROBAN) 2 % ointment Apply topically 3 (three) times daily. 15 g 4/28/2025 -- Holdsworth, Alayna, PA-C    sulfamethoxazole-trimethoprim 800-160mg (BACTRIM DS) 800-160 mg Tab Take 1 tablet by mouth 2 (two) times daily. for 7 days 14 tablet 4/28/2025 5/5/2025 Holdsworth, Alayna, PA-C    fluconazole (DIFLUCAN) 200 MG Tab Take 1 tablet (200 mg total) by mouth daily as needed (yeast infection). Take 1 tablet for yeast infection as needed. Take 2nd tablet 72 hours later if symptoms persist 2 tablet 4/28/2025 -- Holdsworth,  MICHELE Estrada          Follow-up Information       Follow up With Specialties Details Why Contact Info    Graciela Mercer NP Family Medicine Call   [address] [facility] [address] [address] [phone]                   [1]   Social History  Tobacco Use    Smoking status: Never   Substance Use Topics    Alcohol use: No        Holdsworth, Alayna, PA-C  04/28/25 5062

## 2025-04-28 NOTE — DISCHARGE INSTRUCTIONS

## 2025-05-21 ENCOUNTER — OFFICE VISIT (OUTPATIENT)
Dept: GASTROENTEROLOGY | Facility: CLINIC | Age: 45
End: 2025-05-21
Payer: MEDICAID

## 2025-05-21 VITALS — WEIGHT: 293 LBS | HEIGHT: 67 IN | BODY MASS INDEX: 45.99 KG/M2

## 2025-05-21 DIAGNOSIS — R93.2 ABNORMAL LIVER ULTRASOUND: ICD-10-CM

## 2025-05-21 DIAGNOSIS — E66.01 CLASS 3 SEVERE OBESITY DUE TO EXCESS CALORIES WITH SERIOUS COMORBIDITY AND BODY MASS INDEX (BMI) OF 60.0 TO 69.9 IN ADULT: Primary | ICD-10-CM

## 2025-05-21 DIAGNOSIS — E66.813 CLASS 3 SEVERE OBESITY DUE TO EXCESS CALORIES WITH SERIOUS COMORBIDITY AND BODY MASS INDEX (BMI) OF 60.0 TO 69.9 IN ADULT: Primary | ICD-10-CM

## 2025-05-21 DIAGNOSIS — Z98.84 BARIATRIC SURGERY STATUS: ICD-10-CM

## 2025-05-21 DIAGNOSIS — R10.13 EPIGASTRIC PAIN: ICD-10-CM

## 2025-05-21 PROCEDURE — 3008F BODY MASS INDEX DOCD: CPT | Mod: CPTII,,, | Performed by: INTERNAL MEDICINE

## 2025-05-21 PROCEDURE — 99204 OFFICE O/P NEW MOD 45 MIN: CPT | Mod: S$PBB,,, | Performed by: INTERNAL MEDICINE

## 2025-05-21 PROCEDURE — 1160F RVW MEDS BY RX/DR IN RCRD: CPT | Mod: CPTII,,, | Performed by: INTERNAL MEDICINE

## 2025-05-21 PROCEDURE — 99999 PR PBB SHADOW E&M-EST. PATIENT-LVL IV: CPT | Mod: PBBFAC,,, | Performed by: INTERNAL MEDICINE

## 2025-05-21 PROCEDURE — 99214 OFFICE O/P EST MOD 30 MIN: CPT | Mod: PBBFAC | Performed by: INTERNAL MEDICINE

## 2025-05-21 PROCEDURE — 1159F MED LIST DOCD IN RCRD: CPT | Mod: CPTII,,, | Performed by: INTERNAL MEDICINE

## 2025-05-21 NOTE — PROGRESS NOTES
SUBJECTIVE:         Chief Complaint:   Chief Complaint   Patient presents with    Referral       History of Present Illness:  Patient is a 44 y.o. female presents with a perisplenic fluid collection.  She is very pleasant 44-year-old female who was noted to have a pancreatic tail cystic structure.  She underwent a gastric bypass last year and has been able to fortunately lose proximally 120 lb.  During workup and evaluation for her bypass she had cross-sectional imaging that demonstrates this fluid collection has been present for 10 years but has slowly increased in size.  She has chronic abdominal pain diffusely.  I do not believe this fluid collection is contributing to her pain.         OBJECTIVE:     Vital Signs (Most Recent)       General: well developed, well nourished, mild distress    Diagnostic Results:  CT: Reviewed  MRI: Reviewed      ASSESSMENT/PLAN:     She has a chronic pancreatic tail cystic structure. The differential as described in the MRI includes mucinous cystic process or a serous cystadenoma.  Accessing the area is problematic given her recent gastric bypass.  This would require an EDGE procedure with high-risk for complications and minimal benefits.  This area appears to be accessible via percutaneous access.  I would suggest referring this patient to local interventional radiologist for percutaneous access and needle aspiration.  The fluid should be sent for CEA and amylase.  At that point we can make a determination as to whether she would benefit from further surveillance or an endoscopic intervention necessitating the edge procedure.  Please keep us informed of her management.